# Patient Record
Sex: FEMALE | Race: BLACK OR AFRICAN AMERICAN | NOT HISPANIC OR LATINO | URBAN - METROPOLITAN AREA
[De-identification: names, ages, dates, MRNs, and addresses within clinical notes are randomized per-mention and may not be internally consistent; named-entity substitution may affect disease eponyms.]

---

## 2017-07-09 ENCOUNTER — INPATIENT (INPATIENT)
Facility: HOSPITAL | Age: 72
LOS: 2 days | Discharge: HOME | End: 2017-07-12
Attending: HOSPITALIST | Admitting: HOSPITALIST
Payer: COMMERCIAL

## 2017-07-09 VITALS
RESPIRATION RATE: 15 BRPM | OXYGEN SATURATION: 100 % | DIASTOLIC BLOOD PRESSURE: 63 MMHG | TEMPERATURE: 98 F | SYSTOLIC BLOOD PRESSURE: 142 MMHG | HEART RATE: 71 BPM

## 2017-07-09 DIAGNOSIS — Z79.890 HORMONE REPLACEMENT THERAPY: ICD-10-CM

## 2017-07-09 DIAGNOSIS — I10 ESSENTIAL (PRIMARY) HYPERTENSION: ICD-10-CM

## 2017-07-09 DIAGNOSIS — L03.115 CELLULITIS OF RIGHT LOWER LIMB: ICD-10-CM

## 2017-07-09 DIAGNOSIS — Z90.710 ACQUIRED ABSENCE OF BOTH CERVIX AND UTERUS: Chronic | ICD-10-CM

## 2017-07-09 LAB
ALBUMIN SERPL ELPH-MCNC: 3.8 G/DL — SIGNIFICANT CHANGE UP (ref 3.3–5)
ALP SERPL-CCNC: 87 U/L — SIGNIFICANT CHANGE UP (ref 40–120)
ALT FLD-CCNC: 12 U/L — SIGNIFICANT CHANGE UP (ref 4–33)
AST SERPL-CCNC: 18 U/L — SIGNIFICANT CHANGE UP (ref 4–32)
BASE EXCESS BLDV CALC-SCNC: 2.9 MMOL/L — SIGNIFICANT CHANGE UP
BASOPHILS # BLD AUTO: 0.01 K/UL — SIGNIFICANT CHANGE UP (ref 0–0.2)
BASOPHILS NFR BLD AUTO: 0.1 % — SIGNIFICANT CHANGE UP (ref 0–2)
BILIRUB SERPL-MCNC: 0.8 MG/DL — SIGNIFICANT CHANGE UP (ref 0.2–1.2)
BLOOD GAS VENOUS - CREATININE: 0.65 MG/DL — SIGNIFICANT CHANGE UP (ref 0.5–1.3)
BUN SERPL-MCNC: 12 MG/DL — SIGNIFICANT CHANGE UP (ref 7–23)
CALCIUM SERPL-MCNC: 9.7 MG/DL — SIGNIFICANT CHANGE UP (ref 8.4–10.5)
CHLORIDE BLDV-SCNC: 108 MMOL/L — SIGNIFICANT CHANGE UP (ref 96–108)
CHLORIDE SERPL-SCNC: 102 MMOL/L — SIGNIFICANT CHANGE UP (ref 98–107)
CO2 SERPL-SCNC: 25 MMOL/L — SIGNIFICANT CHANGE UP (ref 22–31)
CREAT SERPL-MCNC: 0.77 MG/DL — SIGNIFICANT CHANGE UP (ref 0.5–1.3)
EOSINOPHIL # BLD AUTO: 0.11 K/UL — SIGNIFICANT CHANGE UP (ref 0–0.5)
EOSINOPHIL NFR BLD AUTO: 1.4 % — SIGNIFICANT CHANGE UP (ref 0–6)
GAS PNL BLDV: 136 MMOL/L — SIGNIFICANT CHANGE UP (ref 136–146)
GLUCOSE BLDV-MCNC: 102 — HIGH (ref 70–99)
GLUCOSE SERPL-MCNC: 102 MG/DL — HIGH (ref 70–99)
HCO3 BLDV-SCNC: 25 MMOL/L — SIGNIFICANT CHANGE UP (ref 20–27)
HCT VFR BLD CALC: 28.8 % — LOW (ref 34.5–45)
HCT VFR BLDV CALC: 31.2 % — LOW (ref 34.5–45)
HGB BLD-MCNC: 10.1 G/DL — LOW (ref 11.5–15.5)
HGB BLDV-MCNC: 10.1 G/DL — LOW (ref 11.5–15.5)
IMM GRANULOCYTES # BLD AUTO: 0.04 # — SIGNIFICANT CHANGE UP
IMM GRANULOCYTES NFR BLD AUTO: 0.5 % — SIGNIFICANT CHANGE UP (ref 0–1.5)
LACTATE BLDV-MCNC: 0.9 MMOL/L — SIGNIFICANT CHANGE UP (ref 0.5–2)
LYMPHOCYTES # BLD AUTO: 1.53 K/UL — SIGNIFICANT CHANGE UP (ref 1–3.3)
LYMPHOCYTES # BLD AUTO: 18.9 % — SIGNIFICANT CHANGE UP (ref 13–44)
MCHC RBC-ENTMCNC: 33.6 PG — SIGNIFICANT CHANGE UP (ref 27–34)
MCHC RBC-ENTMCNC: 35.1 % — SIGNIFICANT CHANGE UP (ref 32–36)
MCV RBC AUTO: 95.7 FL — SIGNIFICANT CHANGE UP (ref 80–100)
MONOCYTES # BLD AUTO: 0.87 K/UL — SIGNIFICANT CHANGE UP (ref 0–0.9)
MONOCYTES NFR BLD AUTO: 10.7 % — SIGNIFICANT CHANGE UP (ref 2–14)
NEUTROPHILS # BLD AUTO: 5.54 K/UL — SIGNIFICANT CHANGE UP (ref 1.8–7.4)
NEUTROPHILS NFR BLD AUTO: 68.4 % — SIGNIFICANT CHANGE UP (ref 43–77)
NRBC # FLD: 0 — SIGNIFICANT CHANGE UP
PCO2 BLDV: 51 MMHG — SIGNIFICANT CHANGE UP (ref 41–51)
PH BLDV: 7.36 PH — SIGNIFICANT CHANGE UP (ref 7.32–7.43)
PLATELET # BLD AUTO: 173 K/UL — SIGNIFICANT CHANGE UP (ref 150–400)
PMV BLD: 10.8 FL — SIGNIFICANT CHANGE UP (ref 7–13)
PO2 BLDV: < 24 MMHG — LOW (ref 35–40)
POTASSIUM BLDV-SCNC: 4 MMOL/L — SIGNIFICANT CHANGE UP (ref 3.4–4.5)
POTASSIUM SERPL-MCNC: 4.3 MMOL/L — SIGNIFICANT CHANGE UP (ref 3.5–5.3)
POTASSIUM SERPL-SCNC: 4.3 MMOL/L — SIGNIFICANT CHANGE UP (ref 3.5–5.3)
PROT SERPL-MCNC: 9.2 G/DL — HIGH (ref 6–8.3)
RBC # BLD: 3.01 M/UL — LOW (ref 3.8–5.2)
RBC # FLD: 16.3 % — HIGH (ref 10.3–14.5)
SAO2 % BLDV: 26.1 % — LOW (ref 60–85)
SODIUM SERPL-SCNC: 140 MMOL/L — SIGNIFICANT CHANGE UP (ref 135–145)
WBC # BLD: 8.1 K/UL — SIGNIFICANT CHANGE UP (ref 3.8–10.5)
WBC # FLD AUTO: 8.1 K/UL — SIGNIFICANT CHANGE UP (ref 3.8–10.5)

## 2017-07-09 PROCEDURE — 93971 EXTREMITY STUDY: CPT | Mod: 26,LT

## 2017-07-09 PROCEDURE — 99223 1ST HOSP IP/OBS HIGH 75: CPT | Mod: AI,GC

## 2017-07-09 RX ORDER — AMLODIPINE BESYLATE 2.5 MG/1
5 TABLET ORAL DAILY
Qty: 0 | Refills: 0 | Status: DISCONTINUED | OUTPATIENT
Start: 2017-07-09 | End: 2017-07-12

## 2017-07-09 RX ORDER — IBUPROFEN 200 MG
400 TABLET ORAL ONCE
Qty: 0 | Refills: 0 | Status: COMPLETED | OUTPATIENT
Start: 2017-07-09 | End: 2017-07-09

## 2017-07-09 RX ORDER — ACETAMINOPHEN 500 MG
650 TABLET ORAL EVERY 6 HOURS
Qty: 0 | Refills: 0 | Status: DISCONTINUED | OUTPATIENT
Start: 2017-07-09 | End: 2017-07-12

## 2017-07-09 RX ORDER — VANCOMYCIN HCL 1 G
1000 VIAL (EA) INTRAVENOUS EVERY 12 HOURS
Qty: 0 | Refills: 0 | Status: DISCONTINUED | OUTPATIENT
Start: 2017-07-10 | End: 2017-07-11

## 2017-07-09 RX ORDER — METOPROLOL TARTRATE 50 MG
50 TABLET ORAL
Qty: 0 | Refills: 0 | Status: DISCONTINUED | OUTPATIENT
Start: 2017-07-09 | End: 2017-07-12

## 2017-07-09 RX ORDER — OXYCODONE HYDROCHLORIDE 5 MG/1
5 TABLET ORAL EVERY 6 HOURS
Qty: 0 | Refills: 0 | Status: DISCONTINUED | OUTPATIENT
Start: 2017-07-09 | End: 2017-07-12

## 2017-07-09 RX ORDER — VANCOMYCIN HCL 1 G
1000 VIAL (EA) INTRAVENOUS ONCE
Qty: 0 | Refills: 0 | Status: COMPLETED | OUTPATIENT
Start: 2017-07-09 | End: 2017-07-09

## 2017-07-09 RX ORDER — HEPARIN SODIUM 5000 [USP'U]/ML
5000 INJECTION INTRAVENOUS; SUBCUTANEOUS EVERY 8 HOURS
Qty: 0 | Refills: 0 | Status: DISCONTINUED | OUTPATIENT
Start: 2017-07-09 | End: 2017-07-12

## 2017-07-09 RX ORDER — FUROSEMIDE 40 MG
20 TABLET ORAL DAILY
Qty: 0 | Refills: 0 | Status: DISCONTINUED | OUTPATIENT
Start: 2017-07-09 | End: 2017-07-09

## 2017-07-09 RX ADMIN — Medication 50 MILLIGRAM(S): at 21:37

## 2017-07-09 RX ADMIN — OXYCODONE HYDROCHLORIDE 5 MILLIGRAM(S): 5 TABLET ORAL at 23:44

## 2017-07-09 RX ADMIN — Medication 250 MILLIGRAM(S): at 14:12

## 2017-07-09 RX ADMIN — Medication 400 MILLIGRAM(S): at 15:00

## 2017-07-09 RX ADMIN — Medication 400 MILLIGRAM(S): at 15:16

## 2017-07-09 NOTE — ED PROVIDER NOTE - OBJECTIVE STATEMENT
71 y/o F with PMH of HTN presents with RLE swelling & erythema of 6 week duration s/p trip and fall.. Patient states she has been on three outpatient antibiotic regimens, most recently Bactrim, but infection has not improved. PMD Dr. Brown English recommended she come to hospital for IV abx. Patient denies any fever/chills, night sweats, SOB, CP, recent travel. She reports hematochezia of two day duration which she attributes to medication regimen, but denies any abdominal pain. 73 y/o F with PMH of HTN presents with RLE swelling & erythema of 6 week duration s/p trip and fall.. Patient states she has been on three outpatient antibiotic regimens, most recently Bactrim, but infection has not improved. PMD Dr. Brown English recommended she come to hospital for IV abx. Patient denies any fever/chills, night sweats, SOB, CP, recent travel. She reports hematochezia of two day duration which she attributes to medication regimen, but denies any abdominal pain, dizziness, fatigue or SOB>.

## 2017-07-09 NOTE — ED ADULT TRIAGE NOTE - CHIEF COMPLAINT QUOTE
Patient sent in by PMD for IV antibiotics. Patient s/p fall one month ago and was diagnosed with cellulitis to RLE which failed PO antibiotics. RLE erythematous and warm to touch. PMH- htn PSH- hysterectomy

## 2017-07-09 NOTE — H&P ADULT - HISTORY OF PRESENT ILLNESS
73 yo F with PMH of HTN presents for RLE swelling and pain for 6 weeks. Patient reports walking in a department store on May 26, 2017 when she tripped over a rack with her right leg and fell. She didn't notice any obvious cut on her right leg at the time. The next morning, patient endorses egg-size swelling and pain over the midshin of right leg. Patient was seen by PMD with xray noting soft tissue tear but no bone fracture or tear. Patient was started on 3 PO abx of the courses of one month with no significant improvement in rt leg symptoms. She does not remember the first 2 abx but last abx was bactrim and she was admitted to come to ED for 71 yo F with PMH of HTN presents for RLE swelling and pain for 6 weeks. Patient reports walking in a department store on May 26, 2017 when she tripped over a rack with her right leg and fell. She didn't notice any obvious cut on her right leg at the time. The next morning, patient endorses egg-size swelling and pain over the midshin of right leg. Patient was seen by PMD with xray noting soft tissue tear but no bone fracture or tear. Patient was started on 3 PO abx over the course of one month with no significant improvement in rt leg symptoms. She does not remember the first 2 abx but last abx was bactrim (started on Wednesday) and she was advised by PMD to come to ED for IV abx by Sunday (7/9) as her symptoms as has not improved. Patient reports the right foot pain as 5/10 during the day and 10/10 at night. She take 4 advil per day with adequately pain control only during the day. She denies fever, chill, n/v, abd pain, SOB, HA, dizziness dysuria, diarrhea, fatigue. No recent traveling or sick contact.    In ED, T98, HR 71, /63, HR 15, Sat 100 RA. Patient was given 1g of vancomycin. 73 yo F with PMH of HTN presents for RLE swelling and pain for 6 weeks. Patient reports walking in a department store on May 26, 2017 when she tripped over a rack with her right leg and fell. She didn't notice any obvious cut on her right leg at the time. The next morning, patient endorses egg-size swelling and pain over the midshin of right leg. Patient was seen by PMD with xray noting soft tissue tear but no bone fracture or tear. Patient was started on 3 PO abx over the course of one month with no significant improvement in rt leg symptoms. She does not remember the first 2 abx but last abx was bactrim (started on Wednesday) and she was advised by PMD to come to ED for IV abx by Sunday (7/9) as her symptoms as has not improved. Patient reports the right foot pain as 5/10 during the day and 10/10 at night. She take 4 advil per day with adequately pain control only during the day. She denies fever, chill, n/v, abd pain, SOB, HA, dizziness dysuria, diarrhea, fatigue. No recent traveling, sick contact, hx of cellulitis in the past.     In ED, T98, HR 71, /63, HR 15, Sat 100 RA. Patient was given 1g of vancomycin.

## 2017-07-09 NOTE — PROGRESS NOTE ADULT - ASSESSMENT
73 yo F with PMH of HTN presents for RLE swelling and pain for past month likely secondary to cellulitis, receiving empiric treatment with IV vancomycin.

## 2017-07-09 NOTE — ED PROVIDER NOTE - ATTENDING CONTRIBUTION TO CARE
72f, pmhx htn. sent by PMD for IV abx. pt with 1 month of RLE pain and swelling. unchanged. no f/c, c/p, sob. started after local trauma. not immunocompromised. no h/o vte or vte rf. PMD put her on 3 different abx (she doesn't know names other than bactrim, her most recent one), with no improvement. exam, vs wnl, and. hs and lungs normal, abdo benign. right calf and ankle erythema, pain and swelling and heat. nil above knee. pulses intact, nvi. likely RLE cellulitis, will r/o dvt. no s/s nec fasc. will give vanc, bloods, duplex, admit.

## 2017-07-09 NOTE — H&P ADULT - NSHPPHYSICALEXAM_GEN_ALL_CORE
PHYSICAL EXAM:  GENERAL: NAD, well-groomed, well-developed  HEAD:  Atraumatic, Normocephalic  EYES: EOMI, PERRLA, conjunctiva and sclera clear  ENMT: No tonsillar erythema, exudates, or enlargement; Moist mucous membranes,   NECK: Supple, No JVD, Normal thyroid  HEART: Regular rate and rhythm; No murmurs, rubs, or gallops  RESPIRATORY: CTA B/L, No W/R/R  ABDOMEN: Soft, Nontender, Nondistended; Bowel sounds present  NEUROLOGY: A&Ox3, nonfocal, CN II-XII grossly intact, sensation intact, no gait abnormalities bilaterally;  EXTREMITIES:  2+ Peripheral Pulses  SKIN: right lower extremity nonpitting swelling with tenderness distal to midshin. Midshin midline linear depression with surrounding warmth, swelling and erythema.

## 2017-07-09 NOTE — H&P ADULT - PROBLEM SELECTOR PLAN 1
-likely cellulitis 2/2 recent injury due to mechanical fall  -deep infection less likely as patient has no fever, leukocytosis with no drainage or obvious wound on the RLE  -will c/w vancomycin for now as patient has failed to improved with PO abx for 1 month  -if clinically improved on vancomycin, will deescalate abx regimen  - -likely cellulitis 2/2 recent injury due to mechanical fall  -deep infection less likely as patient has no fever, leukocytosis with no drainage or obvious wound on the RLE  -s/p 1g of vancomycin in ED  -patient has no risk factor for MRSA, will continue empirical coverage with IV clindamcyin, consider swithcing to PO abx if clinically improving  -f/u blood cx -likely cellulitis 2/2 recent injury due to mechanical fall  -deep infection less likely as patient has no fever, leukocytosis with no drainage on the RLE  -U/S of RLE was negative for DVT  -s/p 1g of vancomycin in ED  -patient has no risk factor for MRSA, will continue empirical coverage with IV clindamcyin, consider swithcing to PO abx if clinically improving  -f/u blood cx -likely cellulitis 2/2 recent injury due to mechanical fall  -deep infection less likely as patient has no fever, leukocytosis with no purulent drainage on the RLE  -U/S of RLE was negative for DVT  -s/p 1g of vancomycin in ED  -patient has no risk factor for MRSA, will continue empirical coverage with IV clindamycin, consider switching to PO abx if clinically improving  -f/u blood cx -likely cellulitis 2/2 recent injury due to mechanical fall  -deep infection less likely as patient has no fever, leukocytosis with no purulent drainage on the RLE  -U/S of RLE was negative for DVT  -c/w empirical coverage with vancomycin  -check ESR/CRP and xray of right foot to r/o OM  -f/u blood cx  -consult ID

## 2017-07-09 NOTE — ED PROVIDER NOTE - MEDICAL DECISION MAKING DETAILS
73 y/o F hx of HTN presents with RLE cellulitis of 6 week duration failed outpatient abx treatment. Will consider DVT although no risk factors. Less likely necrotizing soft tissue infection as has remained stable.    Plan: basic labs + lactate, venous duplex r/o DVT, start IV abx, admit.

## 2017-07-09 NOTE — PROGRESS NOTE ADULT - PROBLEM SELECTOR PLAN 2
-BP at goal of <150 systolic  -c/w home metoprolol, amlodipine.  Lasix held while in hospital. If pressures rise, can resume it.

## 2017-07-09 NOTE — PROGRESS NOTE ADULT - SUBJECTIVE AND OBJECTIVE BOX
CARE MODEL A ACCEPT NOTE    Patient is a 72y old  Female who presents with a chief complaint of right foot pain/swelling (09 Jul 2017 14:29)       HPI: Patient is a 72 year old with PMH hypertension who presents with RLE pain, swelling, warmth, and erythema distal to the knee after falling over on a clothing rack 1 month prior to admission. When she fell, she noticed no cuts or breaks in the skin. The following day, she noticed pain and a raised lump on  her right mid-shin. Her walking/ROM have not been affected. Patient denies fevers/chills during this time. She went to her PMD several times in the past month for treatment of this painful leg. She was started on a number of oral Abx which she cannot remember (per chart, pt. was on Bactrim last Wednesday), but none of these oral Abx treated her symptoms.     In the ED, patient had a RLE ultrasound which ruled out DVT. Labs showed no leukocytosis. X-ray of tibia was performed to rule out osteomyelitis.     MEDICATIONS  (STANDING):  metoprolol 50 milliGRAM(s) Oral two times a day  amLODIPine   Tablet 5 milliGRAM(s) Oral daily  heparin  Injectable 5000 Unit(s) SubCutaneous every 8 hours  vancomycin  IVPB 1000 milliGRAM(s) IV Intermittent every 12 hours    MEDICATIONS  (PRN):  acetaminophen   Tablet. 650 milliGRAM(s) Oral every 6 hours PRN mild to moderate pain  oxyCODONE    IR 5 milliGRAM(s) Oral every 6 hours PRN Severe Pain (7 - 10)      Allergies    No Known Allergies    Intolerances        REVIEW OF SYSTEMS:  CONSTITUTIONAL: No fever, or fatigue  ENMT: no throat pain  RESPIRATORY: No cough, No shortness of breath  CARDIOVASCULAR: + Right leg swelling. No chest pain, palpitations, dizziness.  GASTROINTESTINAL: No nausea, vomiting, diarrhea, or abdominal pain.   NEUROLOGICAL: No numbness. Intact sensation  SKIN: Swelling, redness and pain on right lower extremity distal to the knee  MUSCULOSKELETAL: no difficulties walking on RLE      Vital Signs Last 24 Hrs  T(C): 36.5 (09 Jul 2017 15:57), Max: 36.7 (09 Jul 2017 11:19)  T(F): 97.7 (09 Jul 2017 15:57), Max: 98 (09 Jul 2017 11:19)  HR: 72 (09 Jul 2017 15:57) (71 - 77)  BP: 149/76 (09 Jul 2017 15:57) (142/63 - 149/76)  BP(mean): --  RR: 18 (09 Jul 2017 15:57) (15 - 18)  SpO2: 100% (09 Jul 2017 15:57) (100% - 100%)    PHYSICAL EXAM:  GENERAL: NAD  HEAD:  Atraumatic, Normocephalic  EYES: EOMI, PERRLA, conjunctiva and sclera clear  ENMT:  Moist mucous membranes  NERVOUS SYSTEM: AOX3, motor and sensation grossly intact in b/l UE and b/l LE  PSYCHIATRIC: Appropriate affect and mood  CHEST/LUNG: Clear to auscultation bilaterally  HEART: Regular rate and rhythm; No murmurs, rubs, or gallops. 2+ pitting LE edema in RLE  ABDOMEN: Soft, Nontender, Nondistended; Bowel sounds present  EXTREMITIES:  2+ Peripheral Pulses, dorsalis pedis pulse not felt in RLE. 2+ pitting edema in RLE.  SKIN: Erythema and induration in RLE mid-shin, no purulence/discharge, warm to the touch     LABS:                        10.1   8.10  )-----------( 173      ( 09 Jul 2017 12:45 )             28.8     09 Jul 2017 12:45    140    |  102    |  12     ----------------------------<  102    4.3     |  25     |  0.77     Ca    9.7        09 Jul 2017 12:45    TPro  9.2    /  Alb  3.8    /  TBili  0.8    /  DBili  x      /  AST  18     /  ALT  12     /  AlkPhos  87     09 Jul 2017 12:45      CAPILLARY BLOOD GLUCOSE        BLOOD CULTURE    RADIOLOGY & ADDITIONAL TESTS:    Imaging Personally Reviewed:  [ ] YES     Consultant(s) Notes Reviewed:      Care Discussed with Consultants/Other Providers: CARE MODEL A ACCEPT NOTE    Patient is a 72y old  Female who presents with a chief complaint of right foot pain/swelling (09 Jul 2017 14:29)       HPI: Patient is a 72 year old with PMH hypertension who presents with RLE pain, swelling, warmth, and erythema distal to the knee after falling over on a clothing rack 1 month prior to admission. When she fell, she noticed no cuts or breaks in the skin. The following day, she noticed pain and a raised lump on  her right mid-shin. Her walking/ROM have not been affected. Patient denies fevers/chills during this time. She went to her PMD several times in the past month for treatment of this painful leg. She was started on a number of oral Abx which she cannot remember (per chart, pt. was on Bactrim last Wednesday), but none of these oral Abx treated her symptoms.     In the ED, patient had a RLE ultrasound which ruled out DVT. Labs showed no leukocytosis. X-ray of tibia was ordered to rule out osteomyelitis.     MEDICATIONS  (STANDING):  metoprolol 50 milliGRAM(s) Oral two times a day  amLODIPine   Tablet 5 milliGRAM(s) Oral daily  heparin  Injectable 5000 Unit(s) SubCutaneous every 8 hours  vancomycin  IVPB 1000 milliGRAM(s) IV Intermittent every 12 hours    MEDICATIONS  (PRN):  acetaminophen   Tablet. 650 milliGRAM(s) Oral every 6 hours PRN mild to moderate pain  oxyCODONE    IR 5 milliGRAM(s) Oral every 6 hours PRN Severe Pain (7 - 10)      Allergies    No Known Allergies    Intolerances        REVIEW OF SYSTEMS:  CONSTITUTIONAL: No fever, or fatigue  ENMT: no throat pain  RESPIRATORY: No cough, No shortness of breath  CARDIOVASCULAR: + Right leg swelling. No chest pain, palpitations, dizziness.  GASTROINTESTINAL: No nausea, vomiting, diarrhea, or abdominal pain.   NEUROLOGICAL: No numbness. Intact sensation  SKIN: Swelling, redness and pain on right lower extremity distal to the knee  MUSCULOSKELETAL: no difficulties walking on RLE      Vital Signs Last 24 Hrs  T(C): 36.5 (09 Jul 2017 15:57), Max: 36.7 (09 Jul 2017 11:19)  T(F): 97.7 (09 Jul 2017 15:57), Max: 98 (09 Jul 2017 11:19)  HR: 72 (09 Jul 2017 15:57) (71 - 77)  BP: 149/76 (09 Jul 2017 15:57) (142/63 - 149/76)  BP(mean): --  RR: 18 (09 Jul 2017 15:57) (15 - 18)  SpO2: 100% (09 Jul 2017 15:57) (100% - 100%)    PHYSICAL EXAM:  GENERAL: NAD  HEAD:  Atraumatic, Normocephalic  EYES: EOMI, PERRLA, conjunctiva and sclera clear  ENMT:  Moist mucous membranes  NERVOUS SYSTEM: AOX3, motor and sensation grossly intact in b/l UE and b/l LE  PSYCHIATRIC: Appropriate affect and mood  CHEST/LUNG: Clear to auscultation bilaterally  HEART: Regular rate and rhythm; No murmurs, rubs, or gallops. 2+ pitting LE edema in RLE  ABDOMEN: Soft, Nontender, Nondistended; Bowel sounds present  EXTREMITIES:  2+ Peripheral Pulses, dorsalis pedis pulse not felt in RLE. 2+ pitting edema in RLE.  SKIN: Erythema and induration in RLE mid-shin, no purulence/discharge, warm to the touch     LABS:                        10.1   8.10  )-----------( 173      ( 09 Jul 2017 12:45 )             28.8     09 Jul 2017 12:45    140    |  102    |  12     ----------------------------<  102    4.3     |  25     |  0.77     Ca    9.7        09 Jul 2017 12:45    TPro  9.2    /  Alb  3.8    /  TBili  0.8    /  DBili  x      /  AST  18     /  ALT  12     /  AlkPhos  87     09 Jul 2017 12:45      CAPILLARY BLOOD GLUCOSE        BLOOD CULTURE    RADIOLOGY & ADDITIONAL TESTS:    Imaging Personally Reviewed:  [ ] YES     Consultant(s) Notes Reviewed:      Care Discussed with Consultants/Other Providers:

## 2017-07-09 NOTE — H&P ADULT - ATTENDING COMMENTS
72 y.o. female w/ hx HTN who had a atraumatic mechanical fall at a department store 6 weeks ago and develop right shin and ankle erythema, swelling and pain. She has tried 3 different outpatient abx including most recently bactrim which has not improved symptoms so brought to ED. She also had an outpatient MRI 1 week ago which she doesn't have the results for. Will admit for RLE cellulitis w/ possible posterior lateral fluid collection in left ankle. Start IV vancomycin. Son to provide names of other abx tried as outpatient and MRI results. Check ESR, CRP and XRays of  left shin and ankle. Consult ID for advice of abx and fluid ankle fluid collection. May need to be drained.

## 2017-07-09 NOTE — PROGRESS NOTE ADULT - PROBLEM SELECTOR PLAN 1
-likely cellulitis secondary to break in skin following a fall one month prior.   -we do not suspect deeper infection due to patient being afebrile, no leukocytosis, and X-ray showing no osteomyelitis.  - IV vancomycin started 7/9, will follow up with ID in the AM.  -U/S of RLE was negative for DVT  -c/w empirical coverage with vancomycin  -check ESR/CRP and xray of right foot to r/o OM  -f/u blood cx -likely cellulitis secondary to break in skin following a fall one month prior.   -we do not suspect deeper infection due to patient being afebrile, no leukocytosis, no drainage   - IV vancomycin started 7/9, will follow up with ID in the AM.  -U/S of RLE was negative for DVT  -c/w empirical coverage with vancomycin  -check ESR/CRP and xray of right foot to r/o OM  -f/u blood cx

## 2017-07-09 NOTE — ED PROVIDER NOTE - PROGRESS NOTE DETAILS
Reached answering service for Dr. Rogers to inquire to whom he admits at [facility]. Reached answering service [facility] and left message for Dr. Rogers to inquire to whom he admits at [facility]. Reached answering service at [facility] notifying that answering service does not take calls from hospitals and may refer to own hospitalist. LE venous duplex negative for DVT.

## 2017-07-09 NOTE — H&P ADULT - NSHPLABSRESULTS_GEN_ALL_CORE
CBC noted no leukocytosis.  BMP was WNL.  U/S right LE was negative for DVT. CBC noted no leukocytosis and mild normocytic anemia.  CMP was WNL.  U/S right LE was negative for DVT.

## 2017-07-10 LAB
BUN SERPL-MCNC: 10 MG/DL — SIGNIFICANT CHANGE UP (ref 7–23)
CALCIUM SERPL-MCNC: 9.2 MG/DL — SIGNIFICANT CHANGE UP (ref 8.4–10.5)
CHLORIDE SERPL-SCNC: 104 MMOL/L — SIGNIFICANT CHANGE UP (ref 98–107)
CO2 SERPL-SCNC: 25 MMOL/L — SIGNIFICANT CHANGE UP (ref 22–31)
CREAT SERPL-MCNC: 0.73 MG/DL — SIGNIFICANT CHANGE UP (ref 0.5–1.3)
CRP SERPL-MCNC: 28.9 MG/L — HIGH (ref 0.3–5)
ERYTHROCYTE [SEDIMENTATION RATE] IN BLOOD: 117 MM/HR — HIGH (ref 4–25)
GLUCOSE SERPL-MCNC: 102 MG/DL — HIGH (ref 70–99)
HCT VFR BLD CALC: 29.6 % — LOW (ref 34.5–45)
HGB BLD-MCNC: 10.1 G/DL — LOW (ref 11.5–15.5)
MAGNESIUM SERPL-MCNC: 2.1 MG/DL — SIGNIFICANT CHANGE UP (ref 1.6–2.6)
MCHC RBC-ENTMCNC: 32 PG — SIGNIFICANT CHANGE UP (ref 27–34)
MCHC RBC-ENTMCNC: 34.1 % — SIGNIFICANT CHANGE UP (ref 32–36)
MCV RBC AUTO: 93.7 FL — SIGNIFICANT CHANGE UP (ref 80–100)
NRBC # FLD: 0 — SIGNIFICANT CHANGE UP
PHOSPHATE SERPL-MCNC: 3.2 MG/DL — SIGNIFICANT CHANGE UP (ref 2.5–4.5)
PLATELET # BLD AUTO: 180 K/UL — SIGNIFICANT CHANGE UP (ref 150–400)
PMV BLD: 11.1 FL — SIGNIFICANT CHANGE UP (ref 7–13)
POTASSIUM SERPL-MCNC: 3.9 MMOL/L — SIGNIFICANT CHANGE UP (ref 3.5–5.3)
POTASSIUM SERPL-SCNC: 3.9 MMOL/L — SIGNIFICANT CHANGE UP (ref 3.5–5.3)
RBC # BLD: 3.16 M/UL — LOW (ref 3.8–5.2)
RBC # FLD: 16.7 % — HIGH (ref 10.3–14.5)
SODIUM SERPL-SCNC: 141 MMOL/L — SIGNIFICANT CHANGE UP (ref 135–145)
SPECIMEN SOURCE: SIGNIFICANT CHANGE UP
SPECIMEN SOURCE: SIGNIFICANT CHANGE UP
WBC # BLD: 8.09 K/UL — SIGNIFICANT CHANGE UP (ref 3.8–10.5)
WBC # FLD AUTO: 8.09 K/UL — SIGNIFICANT CHANGE UP (ref 3.8–10.5)

## 2017-07-10 PROCEDURE — 73590 X-RAY EXAM OF LOWER LEG: CPT | Mod: 26,RT

## 2017-07-10 PROCEDURE — 73630 X-RAY EXAM OF FOOT: CPT | Mod: 26,RT

## 2017-07-10 PROCEDURE — 99233 SBSQ HOSP IP/OBS HIGH 50: CPT | Mod: GC

## 2017-07-10 PROCEDURE — 99222 1ST HOSP IP/OBS MODERATE 55: CPT

## 2017-07-10 RX ORDER — FUROSEMIDE 40 MG
20 TABLET ORAL DAILY
Qty: 0 | Refills: 0 | Status: DISCONTINUED | OUTPATIENT
Start: 2017-07-10 | End: 2017-07-12

## 2017-07-10 RX ADMIN — OXYCODONE HYDROCHLORIDE 5 MILLIGRAM(S): 5 TABLET ORAL at 22:19

## 2017-07-10 RX ADMIN — Medication 20 MILLIGRAM(S): at 09:37

## 2017-07-10 RX ADMIN — OXYCODONE HYDROCHLORIDE 5 MILLIGRAM(S): 5 TABLET ORAL at 00:14

## 2017-07-10 RX ADMIN — AMLODIPINE BESYLATE 5 MILLIGRAM(S): 2.5 TABLET ORAL at 06:09

## 2017-07-10 RX ADMIN — Medication 50 MILLIGRAM(S): at 06:09

## 2017-07-10 RX ADMIN — Medication 50 MILLIGRAM(S): at 22:18

## 2017-07-10 RX ADMIN — OXYCODONE HYDROCHLORIDE 5 MILLIGRAM(S): 5 TABLET ORAL at 23:15

## 2017-07-10 RX ADMIN — Medication 250 MILLIGRAM(S): at 03:44

## 2017-07-10 RX ADMIN — Medication 250 MILLIGRAM(S): at 13:30

## 2017-07-10 NOTE — PROGRESS NOTE ADULT - PROBLEM SELECTOR PLAN 1
-can be cellulitis secondary to break in skin, vs. complex regional pain syndrome, following a fall one month prior.   -we do not suspect deeper infection due to patient being afebrile, no leukocytosis, no drainage   - IV vancomycin started 7/9, will follow up with ID.  -U/S of RLE was negative for DVT  -c/w empirical coverage with vancomycin  -check ESR/CRP and xray of right foot to r/o OM  -f/u blood cultures  - consider complex regional pain syndrome, obtain triple phase bone scan.  - Obtain MRI records from patient's PCP (MRI done 2 weeks ago) -can be cellulitis secondary to break in skin, vs. complex regional pain syndrome, following a fall one month prior.   -we do not suspect deeper infection due to patient being afebrile, no leukocytosis, no drainage   - IV vancomycin started 7/9, will follow up with ID.  -U/S of RLE was negative for DVT  -c/w empirical coverage with vancomycin  -ESR and CRP elevated, suggestive of infection.  - Follow up xray of right foot and tibia to r/o OM  -f/u blood cultures  - consider complex regional pain syndrome, obtain triple phase bone scan.  - Obtain MRI records from patient's PCP (MRI done 2 weeks ago)

## 2017-07-10 NOTE — PROGRESS NOTE ADULT - SUBJECTIVE AND OBJECTIVE BOX
Patient is a 72y old  Female who presents with a chief complaint of right foot pain/swelling (09 Jul 2017 14:29)       INTERVAL HPI/OVERNIGHT EVENTS: No events overnight. Patient reports slight improvement in RLE pain. Continued swelling and redness of the RLE. No fevers, chills, chest pain, or SOB. Continued on IV vancomycin.     MEDICATIONS  (STANDING):  metoprolol 50 milliGRAM(s) Oral two times a day  amLODIPine   Tablet 5 milliGRAM(s) Oral daily  heparin  Injectable 5000 Unit(s) SubCutaneous every 8 hours  vancomycin  IVPB 1000 milliGRAM(s) IV Intermittent every 12 hours  furosemide    Tablet 20 milliGRAM(s) Oral daily    MEDICATIONS  (PRN):  acetaminophen   Tablet. 650 milliGRAM(s) Oral every 6 hours PRN mild to moderate pain  oxyCODONE    IR 5 milliGRAM(s) Oral every 6 hours PRN Severe Pain (7 - 10)      Allergies    No Known Allergies    Intolerances        REVIEW OF SYSTEMS:  CONSTITUTIONAL: No fever or fatigue  RESPIRATORY: No cough. No shortness of breath  CARDIOVASCULAR: No chest pain, palpitations, dizziness. + Leg swelling   GASTROINTESTINAL: No abdominal pain, nausea, or vomiting.   NEUROLOGICAL: No numbness.   SKIN: No itching. Redness and swelling of the RLE.    MUSCULOSKELETAL: No joint pain or swelling;       Vital Signs Last 24 Hrs  T(C): 36.8 (10 Jul 2017 06:02), Max: 37.1 (09 Jul 2017 21:02)  T(F): 98.3 (10 Jul 2017 06:02), Max: 98.7 (09 Jul 2017 21:02)  HR: 74 (10 Jul 2017 06:02) (72 - 82)  BP: 142/82 (10 Jul 2017 06:02) (136/68 - 149/76)  BP(mean): --  RR: 18 (10 Jul 2017 06:02) (18 - 18)  SpO2: 100% (10 Jul 2017 06:02) (100% - 100%)    PHYSICAL EXAM:  GENERAL: NAD, well-groomed, well-developed  HEAD:  Atraumatic, Normocephalic  EYES: EOMI, PERRLA, conjunctiva and sclera clear  ENMT:  Moist mucous membranes  NERVOUS SYSTEM: AOX3, motor and sensation grossly intact in b/l UE and b/l LE  PSYCHIATRIC: Appropriate affect and mood  CHEST/LUNG: Clear to auscultation bilaterally  HEART: Regular rate and rhythm. Edema in both lower extremities, significantly more on right.   ABDOMEN: Soft, Nontender, Nondistended; Bowel sounds present  EXTREMITIES:  2+ Peripheral Pulses  SKIN: Erythema, swelling and tenderness to touch of RLE distal to knee.    LABS:                        10.1   8.09  )-----------( 180      ( 10 Jul 2017 06:17 )             29.6     10 Jul 2017 06:17    141    |  104    |  10     ----------------------------<  102    3.9     |  25     |  0.73     Ca    9.2        10 Jul 2017 06:17  Phos  3.2       10 Jul 2017 06:17  Mg     2.1       10 Jul 2017 06:17        CAPILLARY BLOOD GLUCOSE        BLOOD CULTURE    RADIOLOGY & ADDITIONAL TESTS:    Imaging Personally Reviewed:  [ ] YES     Consultant(s) Notes Reviewed:      Care Discussed with Consultants/Other Providers:

## 2017-07-10 NOTE — CONSULT NOTE ADULT - ASSESSMENT
Right lower leg cellulitis in 73 yo woman who fell in store 6 weeks ago.  Has failed to resolve despite outpatient oral antibiotics.  With elevated inflammatory markers concern for underlying osteomyelitis.  Would consider repeating study. Agree with empiric Vanco.

## 2017-07-10 NOTE — PHYSICAL THERAPY INITIAL EVALUATION ADULT - GENERAL OBSERVATIONS, REHAB EVAL
Patient received semi supine in bed, in NAD , (+) IV fluids, (+) mild swelling and discoloration of R LE, (pigeon toed)  in toe gait during ambulation .

## 2017-07-10 NOTE — CONSULT NOTE ADULT - SUBJECTIVE AND OBJECTIVE BOX
HPI:  73 yo F with PMH of HTN presents for RLE swelling and pain for 6 weeks. Patient reports walking in a department store on May 26, 2017 when she tripped over a rack with her right leg and fell. She didn't notice any obvious cut on her right leg at the time. The next morning, patient endorses egg-size swelling and pain over the midshin of right leg. Patient was seen by PMD with xray noting soft tissue tear but no bone fracture or tear. Patient was started on 3 PO abx over the course of one month with no significant improvement in rt leg symptoms. She does not remember the first 2 abx but last abx was bactrim (started on Wednesday) and she was advised by PMD to come to ED for IV abx by Sunday (7/9) as her symptoms as has not improved. Patient reports the right foot pain as 5/10 during the day and 10/10 at night. Since admission yesterday continues to have pain right lower leg.    In ED, T98, HR 71, /63, HR 15, Sat 100 RA. Patient was given 1g of vancomycin. (09 Jul 2017 14:29)      PAST MEDICAL & SURGICAL HISTORY:  Essential hypertension  H/O abdominal hysterectomy      Allergies    No Known Allergies    Intolerances        ANTIMICROBIALS:  vancomycin  IVPB 1000 every 12 hours      OTHER MEDS:  metoprolol 50 milliGRAM(s) Oral two times a day  amLODIPine   Tablet 5 milliGRAM(s) Oral daily  heparin  Injectable 5000 Unit(s) SubCutaneous every 8 hours  acetaminophen   Tablet. 650 milliGRAM(s) Oral every 6 hours PRN  oxyCODONE    IR 5 milliGRAM(s) Oral every 6 hours PRN  furosemide    Tablet 20 milliGRAM(s) Oral daily      SOCIAL HISTORY:    FAMILY HISTORY:  No pertinent family history in first degree relatives      REVIEW OF SYSTEMS  [  ] ROS unobtainable because:    [ x ] All other systems negative except as noted below:	    Constitutional:  [ ] fever   Skin:  [ ] rash [ ] phlebitis	  Eyes: [ ] icterus [ ] inflammation	  ENMT: [ ] discharge [ ] thrush [ ] ulcers [ ] exudates  Respiratory: [ ] dyspnea [ ] hemoptysis [ ] cough [ ] sputum	  Cardiovascular:  [ ] chest pain [ ] palpitations [ ] edema	  Gastrointestinal:  [ ] nausea [ ] vomiting [ ] diarrhea [ ] constipation [ ] pain	  Genitourinary:  [ ] dysuria [ ] frequency [ ] hematuria [ ] discharge [ ] flank pain  Musculoskeletal:  right leg pain and swelling	  Neurological:  [ ] headache [ ] seizures	  Psychiatric:  [ ] anxiety [ ] depression	  Hematology/Lymphatics:  [ ] lymphadenopathy  Endocrine:  [ ] adrenal [ ] thyroid  Allergic/Immunologic:	 [ ] transplant [ ] seasonal    PHYSICAL EXAM:  General: pleasant, non toxic  ENT:  [ ] normal [x ] supple   Cardiovascular:   [ ] murmur [x ] normal   Respiratory:  [x ] clear to ausculation bilaterally  GI:  [x] soft, non-tender, normal bowel sounds   Musculoskeletal:  increased warmth and tenderness right lower leg mid shin to below ankle  Neurologic:  [x ] non-focal exam   Skin:  [ ] no rash  Lymph: [ ] no lymphadenopathy  Psychiatric:  [x ] appropriate affect [x ] alert & oriented  Lines:  [x ] no phlebitis [ ] central line          Drug Dosing Weight  Height (cm): 160.02 (09 Jul 2017 16:00)  Weight (kg): 94.2 (09 Jul 2017 16:00)  BMI (kg/m2): 36.8 (09 Jul 2017 16:00)  BSA (m2): 1.97 (09 Jul 2017 16:00)    Vital Signs Last 24 Hrs  T(F): 98.2 (07-10-17 @ 14:41), Max: 98.7 (07-09-17 @ 21:02)    Vital Signs Last 24 Hrs  HR: 79 (07-10-17 @ 14:41) (74 - 82)  BP: 132/59 (07-10-17 @ 14:41) (132/59 - 142/82)  RR: 18 (07-10-17 @ 14:41)  SpO2: 100% (07-10-17 @ 14:41) (100% - 100%)  Wt(kg): --                          10.1   8.09  )-----------( 180      ( 10 Jul 2017 06:17 )       CRP 28             29.6       07-10    141  |  104  |  10  ----------------------------<  102<H>  3.9   |  25  |  0.73    Ca    9.2      10 Jul 2017 06:17  Phos  3.2     07-10  Mg     2.1     07-10    TPro  9.2<H>  /  Alb  3.8  /  TBili  0.8  /  DBili  x   /  AST  18  /  ALT  12  /  AlkPhos  87  07-09          MICROBIOLOGY:  Culture - Blood (07.09.17 @ 14:05)    Culture - Blood:   NO ORGANISMS ISOLATED  NO ORGANISMS ISOLATED AT 24 HOURS    Specimen Source: BLOOD VENOUS    Culture - Blood (07.09.17 @ 14:05)    Culture - Blood:   NO ORGANISMS ISOLATED  NO ORGANISMS ISOLATED AT 24 HOURS    Specimen Source: BLOOD PERIPHERAL      RADIOLOGY:    < from: Xray Foot AP + Lateral + Oblique, Right (07.10.17 @ 15:46) >  Diffuse soft tissue swelling. No tracking soft tissue gas collections,   radiopaque foreign bodies, or gross radiographic evidence for   osteomyelitis.    No fractures or dislocations in the above imaged anatomic regions.    Tarsometatarsal alignment maintained without evidence for a Lisfranc   injury.    Mild right knee tibiofemoral compartment degenerative change. Preserved   foot joint spaces and no joint margin erosions.    Os peroneum present adjacent to the lateral plantar cuboid margin.    Generalized mild osteopenia otherwise no discrete lytic or blastic   lesions.    < end of copied text >

## 2017-07-11 LAB — VANCOMYCIN TROUGH SERPL-MCNC: 9 UG/ML — LOW (ref 10–20)

## 2017-07-11 PROCEDURE — 78315 BONE IMAGING 3 PHASE: CPT | Mod: 26

## 2017-07-11 PROCEDURE — 99232 SBSQ HOSP IP/OBS MODERATE 35: CPT

## 2017-07-11 PROCEDURE — 99233 SBSQ HOSP IP/OBS HIGH 50: CPT | Mod: GC

## 2017-07-11 RX ORDER — VANCOMYCIN HCL 1 G
1250 VIAL (EA) INTRAVENOUS EVERY 12 HOURS
Qty: 0 | Refills: 0 | Status: DISCONTINUED | OUTPATIENT
Start: 2017-07-11 | End: 2017-07-12

## 2017-07-11 RX ORDER — POLYETHYLENE GLYCOL 3350 17 G/17G
17 POWDER, FOR SOLUTION ORAL DAILY
Qty: 0 | Refills: 0 | Status: DISCONTINUED | OUTPATIENT
Start: 2017-07-11 | End: 2017-07-12

## 2017-07-11 RX ORDER — SENNA PLUS 8.6 MG/1
2 TABLET ORAL AT BEDTIME
Qty: 0 | Refills: 0 | Status: DISCONTINUED | OUTPATIENT
Start: 2017-07-11 | End: 2017-07-12

## 2017-07-11 RX ADMIN — SENNA PLUS 2 TABLET(S): 8.6 TABLET ORAL at 22:15

## 2017-07-11 RX ADMIN — OXYCODONE HYDROCHLORIDE 5 MILLIGRAM(S): 5 TABLET ORAL at 06:31

## 2017-07-11 RX ADMIN — OXYCODONE HYDROCHLORIDE 5 MILLIGRAM(S): 5 TABLET ORAL at 07:31

## 2017-07-11 RX ADMIN — Medication 50 MILLIGRAM(S): at 22:15

## 2017-07-11 RX ADMIN — Medication 20 MILLIGRAM(S): at 06:31

## 2017-07-11 RX ADMIN — Medication 50 MILLIGRAM(S): at 06:31

## 2017-07-11 RX ADMIN — Medication 166.67 MILLIGRAM(S): at 06:32

## 2017-07-11 RX ADMIN — Medication 166.67 MILLIGRAM(S): at 18:26

## 2017-07-11 RX ADMIN — AMLODIPINE BESYLATE 5 MILLIGRAM(S): 2.5 TABLET ORAL at 06:31

## 2017-07-11 RX ADMIN — POLYETHYLENE GLYCOL 3350 17 GRAM(S): 17 POWDER, FOR SOLUTION ORAL at 06:52

## 2017-07-11 NOTE — PROGRESS NOTE ADULT - ASSESSMENT
Right lower leg cellulitis Right lower leg cellulitis in woman who fell in department store 6 weeks ago.  Treated outpatient with antibiotic x 3 without improvement.  Now receiving Vanco IV.  ESR  117  CRP  28.  With elevated inflammatory markers concern for OM.  Had outpt MRI few weeks ago.  If can't be reviewed would repeat.    I will be away 7/12-7/16.  ID service will follow.

## 2017-07-11 NOTE — PROGRESS NOTE ADULT - SUBJECTIVE AND OBJECTIVE BOX
Patient is a 72y old  Female who presents with a chief complaint of right foot pain/swelling (09 Jul 2017 14:29)       INTERVAL HPI/OVERNIGHT EVENTS: No acute events overnight. Patient says slight decrease in pain severity in RLE, however at times during the night she has immense pain when the sheets touch her leg. No fevers, chills, SOB, or other complaints.     MEDICATIONS  (STANDING):  metoprolol 50 milliGRAM(s) Oral two times a day  amLODIPine   Tablet 5 milliGRAM(s) Oral daily  heparin  Injectable 5000 Unit(s) SubCutaneous every 8 hours  furosemide    Tablet 20 milliGRAM(s) Oral daily  vancomycin  IVPB 1250 milliGRAM(s) IV Intermittent every 12 hours  polyethylene glycol 3350 17 Gram(s) Oral daily  senna 2 Tablet(s) Oral at bedtime    MEDICATIONS  (PRN):  acetaminophen   Tablet. 650 milliGRAM(s) Oral every 6 hours PRN mild to moderate pain  oxyCODONE    IR 5 milliGRAM(s) Oral every 6 hours PRN Severe Pain (7 - 10)      Allergies    No Known Allergies    Intolerances        REVIEW OF SYSTEMS:  CONSTITUTIONAL: No fever  RESPIRATORY: No cough. No shortness of breath  CARDIOVASCULAR: No chest pain. + right leg swelling  GASTROINTESTINAL: No nausea, vomiting, abdominal pain, or diarrhea.  GENITOURINARY: No urinary complaints.   NEUROLOGICAL: No numbness. Has shooting/tingling sensation at times when she touches/scratches her legs  SKIN: tenderness to palpation of RLE and swelling.      Vital Signs Last 24 Hrs  T(C): 36.7 (11 Jul 2017 05:47), Max: 37.6 (10 Jul 2017 22:03)  T(F): 98.1 (11 Jul 2017 05:47), Max: 99.6 (10 Jul 2017 22:03)  HR: 70 (11 Jul 2017 05:47) (70 - 92)  BP: 111/73 (11 Jul 2017 05:47) (111/73 - 159/78)  BP(mean): --  RR: 16 (11 Jul 2017 05:47) (16 - 18)  SpO2: 100% (11 Jul 2017 05:47) (100% - 100%)    PHYSICAL EXAM:  GENERAL: NAD  HEAD:  Atraumatic, Normocephalic  EYES: EOMI, PERRLA, conjunctiva and sclera clear  ENMT:  Moist mucous membranes  NERVOUS SYSTEM: AOX3, motor and sensation grossly intact in b/l UE and b/l LE  PSYCHIATRIC: Appropriate affect and mood  CHEST/LUNG: Clear to auscultation bilaterally  HEART: Regular rate and rhythm  ABDOMEN: Soft, Nontender, Nondistended; Bowel sounds present  EXTREMITIES:  2+ Peripheral Pulses (except right dorsalis pedis pulse not felt); 2+ pitting edema of right foot and distal leg  SKIN: Warmth and erythema    LABS:      Ca    9.2        10 Jul 2017 06:17        CAPILLARY BLOOD GLUCOSE        BLOOD CULTURE    RADIOLOGY & ADDITIONAL TESTS:    Imaging Personally Reviewed:  [ ] YES     Consultant(s) Notes Reviewed:      Care Discussed with Consultants/Other Providers:

## 2017-07-12 VITALS
SYSTOLIC BLOOD PRESSURE: 151 MMHG | RESPIRATION RATE: 17 BRPM | HEART RATE: 86 BPM | TEMPERATURE: 100 F | OXYGEN SATURATION: 9 % | DIASTOLIC BLOOD PRESSURE: 74 MMHG

## 2017-07-12 DIAGNOSIS — G90.521 COMPLEX REGIONAL PAIN SYNDROME I OF RIGHT LOWER LIMB: ICD-10-CM

## 2017-07-12 LAB
HCT VFR BLD CALC: 25.4 % — LOW (ref 34.5–45)
HGB BLD-MCNC: 8.7 G/DL — LOW (ref 11.5–15.5)
MCHC RBC-ENTMCNC: 32 PG — SIGNIFICANT CHANGE UP (ref 27–34)
MCHC RBC-ENTMCNC: 34.3 % — SIGNIFICANT CHANGE UP (ref 32–36)
MCV RBC AUTO: 93.4 FL — SIGNIFICANT CHANGE UP (ref 80–100)
NRBC # FLD: 0.02 — SIGNIFICANT CHANGE UP
PLATELET # BLD AUTO: 170 K/UL — SIGNIFICANT CHANGE UP (ref 150–400)
PMV BLD: 11.5 FL — SIGNIFICANT CHANGE UP (ref 7–13)
RBC # BLD: 2.72 M/UL — LOW (ref 3.8–5.2)
RBC # FLD: 17 % — HIGH (ref 10.3–14.5)
VANCOMYCIN TROUGH SERPL-MCNC: 14.3 UG/ML — SIGNIFICANT CHANGE UP (ref 10–20)
WBC # BLD: 8.65 K/UL — SIGNIFICANT CHANGE UP (ref 3.8–10.5)
WBC # FLD AUTO: 8.65 K/UL — SIGNIFICANT CHANGE UP (ref 3.8–10.5)

## 2017-07-12 PROCEDURE — 99233 SBSQ HOSP IP/OBS HIGH 50: CPT | Mod: GC

## 2017-07-12 RX ORDER — AZTREONAM 2 G
1 VIAL (EA) INJECTION
Qty: 0 | Refills: 0 | COMMUNITY

## 2017-07-12 RX ORDER — ALENDRONATE SODIUM 70 MG/1
1 TABLET ORAL
Qty: 4 | Refills: 0 | OUTPATIENT
Start: 2017-07-12 | End: 2017-08-11

## 2017-07-12 RX ORDER — ALENDRONATE SODIUM 70 MG/1
1 TABLET ORAL
Qty: 4 | Refills: 0
Start: 2017-07-12 | End: 2017-08-11

## 2017-07-12 RX ORDER — GABAPENTIN 400 MG/1
1 CAPSULE ORAL
Qty: 90 | Refills: 0 | OUTPATIENT
Start: 2017-07-12 | End: 2017-08-11

## 2017-07-12 RX ADMIN — Medication 50 MILLIGRAM(S): at 21:16

## 2017-07-12 RX ADMIN — OXYCODONE HYDROCHLORIDE 5 MILLIGRAM(S): 5 TABLET ORAL at 18:33

## 2017-07-12 RX ADMIN — Medication 166.67 MILLIGRAM(S): at 05:19

## 2017-07-12 RX ADMIN — OXYCODONE HYDROCHLORIDE 5 MILLIGRAM(S): 5 TABLET ORAL at 06:10

## 2017-07-12 RX ADMIN — OXYCODONE HYDROCHLORIDE 5 MILLIGRAM(S): 5 TABLET ORAL at 19:15

## 2017-07-12 RX ADMIN — OXYCODONE HYDROCHLORIDE 5 MILLIGRAM(S): 5 TABLET ORAL at 05:16

## 2017-07-12 RX ADMIN — Medication 20 MILLIGRAM(S): at 05:19

## 2017-07-12 RX ADMIN — POLYETHYLENE GLYCOL 3350 17 GRAM(S): 17 POWDER, FOR SOLUTION ORAL at 07:01

## 2017-07-12 RX ADMIN — Medication 50 MILLIGRAM(S): at 05:19

## 2017-07-12 RX ADMIN — AMLODIPINE BESYLATE 5 MILLIGRAM(S): 2.5 TABLET ORAL at 05:19

## 2017-07-12 NOTE — PROGRESS NOTE ADULT - SUBJECTIVE AND OBJECTIVE BOX
Patient is a 72y old  Female who presents with a chief complaint of right foot pain/swelling (09 Jul 2017 14:29)       INTERVAL HPI/OVERNIGHT EVENTS: No acute events overnight. Patient reports somewhat improved swelling in RLE, with continued tenderness/"shooting electric sensation" on palpation. No SOB, fevers, or chills. No N/V/D/C.     MEDICATIONS  (STANDING):  metoprolol 50 milliGRAM(s) Oral two times a day  amLODIPine   Tablet 5 milliGRAM(s) Oral daily  heparin  Injectable 5000 Unit(s) SubCutaneous every 8 hours  furosemide    Tablet 20 milliGRAM(s) Oral daily  vancomycin  IVPB 1250 milliGRAM(s) IV Intermittent every 12 hours  polyethylene glycol 3350 17 Gram(s) Oral daily  senna 2 Tablet(s) Oral at bedtime    MEDICATIONS  (PRN):  acetaminophen   Tablet. 650 milliGRAM(s) Oral every 6 hours PRN mild to moderate pain  oxyCODONE    IR 5 milliGRAM(s) Oral every 6 hours PRN Severe Pain (7 - 10)      Allergies    No Known Allergies    Intolerances        REVIEW OF SYSTEMS:  CONSTITUTIONAL: No fever or fatigue.   EYES: No complaints.   ENMT:  No complaints.   RESPIRATORY: No cough, No shortness of breath  CARDIOVASCULAR: No chest pain, dizziness. + right leg swelling  GASTROINTESTINAL: No nausea, vomiting, abdominal pain, diarrhea.   GENITOURINARY: No urinary symptoms.   NEUROLOGICAL: No numbness or loss of strength  SKIN: Mild itching of right leg, redness and warmth of RLE  MUSCULOSKELETAL: Swelling in RLE, no joint pain      Vital Signs Last 24 Hrs  T(C): 36.9 (12 Jul 2017 05:15), Max: 37.6 (11 Jul 2017 21:46)  T(F): 98.4 (12 Jul 2017 05:15), Max: 99.6 (11 Jul 2017 21:46)  HR: 79 (12 Jul 2017 05:15) (71 - 87)  BP: 136/81 (12 Jul 2017 05:15) (108/56 - 136/81)  BP(mean): --  RR: 17 (12 Jul 2017 05:15) (17 - 19)  SpO2: 98% (12 Jul 2017 05:15) (97% - 99%)    PHYSICAL EXAM:  GENERAL: NAD  HEAD:  Atraumatic, Normocephalic  EYES: EOMI, PERRLA, conjunctiva and sclera clear  ENMT:  Moist mucous membranes  NERVOUS SYSTEM: AOX3, motor and sensation grossly intact in b/l UE and b/l LE  PSYCHIATRIC: Appropriate affect and mood  CHEST/LUNG: Clear to auscultation bilaterally  HEART: Regular rate and rhythm; RLE 2+ pitting edema in the feet and distal to knee  ABDOMEN: Soft, Nontender, Nondistended; Bowel sounds present  EXTREMITIES:  2+ Peripheral Pulses, dorsalis pedis pulse in RLE difficult to palpate  SKIN: warmth and swelling in RLE distal to knee    LABS:                        8.7    8.65  )-----------( 170      ( 12 Jul 2017 06:02 )             25.4             CAPILLARY BLOOD GLUCOSE        BLOOD CULTURE    RADIOLOGY & ADDITIONAL TESTS:    Imaging Personally Reviewed:  [ ] YES     Consultant(s) Notes Reviewed:      Care Discussed with Consultants/Other Providers:

## 2017-07-12 NOTE — DISCHARGE NOTE ADULT - CARE PROVIDER_API CALL
Arcelia Englishn  [address] [place] [address] [address]  [place] [id number]  Phone: [phone]  Fax: (   )    - Brown English  [address] [place] [address] [address]  [place] [address]  Phone: [phone]  Fax: (   )    -    Clinic, Podiatry  Phone: [phone]  Fax: (   )    -

## 2017-07-12 NOTE — DISCHARGE NOTE ADULT - CARE PLAN
Principal Discharge DX:	Complex regional pain syndrome type 1 of right lower extremity  Instructions for follow-up, activity and diet:	You were hospitalized for pain due to an injury you sustained in your right leg several weeks ago. There was concern for an infection because the pain and swelling did not improve while on antibiotics before you came to the hospital. You were treated briefly with IV antibiotics and you were evaluated by an infectious disease team. The MRI of your right leg was uploaded in our hospital and it does not appear that you have any signs of infection or fracture. You were also evaluated by a physical therapist while you were in the hospital who recommended that you could go home without the need for further physical therapy. Continue to take oxycodone as needed for pain and follow up with your primary care doctor within a week of discharge.  Secondary Diagnosis:	Essential hypertension  Instructions for follow-up, activity and diet:	Your blood pressure was well controlled while you were in the hospital. Continue to take your home blood pressure medications as indicated. Principal Discharge DX:	Complex regional pain syndrome type 1 of right lower extremity  Goal:	Improvement in pain  Instructions for follow-up, activity and diet:	You were hospitalized for pain due to an injury you sustained in your right leg several weeks ago. There was concern for an infection because the pain and swelling did not improve while on antibiotics before you came to the hospital. You were treated briefly with IV antibiotics and you were evaluated by an infectious disease team. The MRI of your right leg was uploaded in our hospital and it does not appear that you have any signs of infection or fracture. You were also evaluated by a physical therapist while you were in the hospital who recommended that you could go home without the need for further physical therapy. Continue to take oxycodone as needed for pain and follow up with your primary care doctor within a week of discharge.  Secondary Diagnosis:	Essential hypertension  Instructions for follow-up, activity and diet:	Your blood pressure was well controlled while you were in the hospital. Continue to take your home blood pressure medications as indicated. Principal Discharge DX:	Complex regional pain syndrome type 1 of right lower extremity  Goal:	Improvement in pain  Instructions for follow-up, activity and diet:	You were hospitalized for pain due to an injury you sustained in your right leg several weeks ago. There was concern for an infection because the pain and swelling did not improve while on antibiotics before you came to the hospital. You were treated briefly with IV antibiotics and you were evaluated by an infectious disease team. The MRI of your right leg was uploaded in our hospital and it does not appear that you have any signs of infection or fracture. You were also evaluated by a physical therapist while you were in the hospital who recommended that you could go home without the need for further physical therapy. Follow up with your primary care doctor within a week of discharge.   Take Ibuprofen 600 mg twice daily WITH meals, take gabapentin 100 mg three times daily, and take Fosamax 70 mg once a week on an empty stomach, sitting upright for an hour.  Secondary Diagnosis:	Essential hypertension  Instructions for follow-up, activity and diet:	Your blood pressure was well controlled while you were in the hospital. Continue to take your home blood pressure medications as indicated.

## 2017-07-12 NOTE — DISCHARGE NOTE ADULT - MEDICATION SUMMARY - MEDICATIONS TO TAKE
I will START or STAY ON the medications listed below when I get home from the hospital:    gabapentin 100 mg oral capsule  -- 1 cap(s) by mouth 3 times a day  -- It is very important that you take or use this exactly as directed.  Do not skip doses or discontinue unless directed by your doctor.  May cause drowsiness.  Alcohol may intensify this effect.  Use care when operating dangerous machinery.    -- Indication: For Complex regional pain syndrome type 1 of right lower extremity    Lopressor 50 mg oral tablet  -- 1 tab(s) by mouth 2 times a day  -- Indication: For Essential hypertension    alendronate 70 mg oral tablet  -- 1 tab(s) by mouth once a week  -- Do not take with milk, juice or antacids. Take with water.  Take medication on an empty stomach 1 hour before or 2 to 3 hours after a meal unless otherwise directed by your doctor.  Take with 8 oz. of plain water at least 30 min before first food/beverage/drug of the day. Do not lie down for 30 min.    -- Indication: For Complex regional pain syndrome type 1 of right lower extremity    amLODIPine 5 mg oral tablet  -- 1 tab(s) by mouth once a day  -- Indication: For Essential hypertension    furosemide 20 mg oral tablet  -- 1 tab(s) by mouth once a day  -- Indication: For Essential hypertension I will START or STAY ON the medications listed below when I get home from the hospital:    gabapentin 100 mg oral capsule  -- 1 cap(s) by mouth 3 times a day  -- It is very important that you take or use this exactly as directed.  Do not skip doses or discontinue unless directed by your doctor.  May cause drowsiness.  Alcohol may intensify this effect.  Use care when operating dangerous machinery.    -- Indication: For Neuropathic pain    Lopressor 50 mg oral tablet  -- 1 tab(s) by mouth 2 times a day  -- Indication: For Essential hypertension    alendronate 70 mg oral tablet  -- 1 tab(s) by mouth once a week  -- Do not take with milk, juice or antacids. Take with water.  Take medication on an empty stomach 1 hour before or 2 to 3 hours after a meal unless otherwise directed by your doctor.  Take with 8 oz. of plain water at least 30 min before first food/beverage/drug of the day. Do not lie down for 30 min.    -- Indication: For Need for prophylactic postmenopausal hormone replacement therapy    amLODIPine 5 mg oral tablet  -- 1 tab(s) by mouth once a day  -- Indication: For Essential hypertension    furosemide 20 mg oral tablet  -- 1 tab(s) by mouth once a day  -- Indication: For Essential hypertension

## 2017-07-12 NOTE — PROGRESS NOTE ADULT - ASSESSMENT
71 yo F with PMH of HTN presents for RLE swelling and pain for past month likely secondary to cellulitis, receiving empiric treatment with IV vancomycin.

## 2017-07-12 NOTE — DISCHARGE NOTE ADULT - ADDITIONAL INSTRUCTIONS
Please follow up with your PCP within a week of discharge. Please follow up with your PCP within a week of discharge.  Make an appointment with Podiatry clinic when you go home.

## 2017-07-12 NOTE — DISCHARGE NOTE ADULT - CONDITIONS AT DISCHARGE
pt alert and oriented, stable to discharge home. pt afebrile, vitals stable. patient medicated for right leg pain with good relief. IV will be removed. discharge instruction will be provided when family arrives.

## 2017-07-12 NOTE — PROGRESS NOTE ADULT - PROBLEM SELECTOR PLAN 1
-can be cellulitis secondary to break in skin, vs. complex regional pain syndrome, following a fall one month prior.   -we do not suspect deeper infection due to patient being afebrile, no leukocytosis, no drainage   - IV vancomycin started 7/9, will follow up with ID.  -U/S of RLE was negative for DVT  -c/w empirical coverage with vancomycin  -ESR and CRP elevated, suggestive of infection.  - triple phase bone scan not suggestive of complex regional pain syndrome  - blood cultures showing no growth to date.   - consider complex regional pain syndrome, obtain triple phase bone scan.  - Waiting for MRI results to be uploaded (outpatient record from 1-2 weeks ago, CD brought in by son). Initially thought to be cellulitis, patient's RLE hyperalgesia and swelling are consistent with complex regional pain syndrome, even though triple phase bone scan was not confirmatory.  Patient remains afebrile with negative blood cultures and unremarkable RLE X-ray and  MRI.  We will discontinue IV antibiotics and send home with the following pain relievers:  - Ibuprofen 600 mg BID (patient instructed to take with food)  - Gabapentin 100 mg TID  - Fosamax 70 mg weekly (patient instructed to take on empty stomach and stay upright)    Will send patient home with number to podiatry clinic for help with relieving weight bearing on the right foot.

## 2017-07-12 NOTE — PROGRESS NOTE ADULT - ATTENDING COMMENTS
ID input appreciated.  I reviewed the bone scan with Nuclear med chief.    he feels the scan is not c/w CRPS.   thus we must reconsider the infectious dx originally postulated.  family member to bring in MRI done 1 week ago- we will review for evidence of OM ([facility] todays scan is not c/w it) and occult fx.
still with severe LE pain.  swelling has decreased.  while she meets the [place] criteria for CRPS we cannot reconcile the markedly elevated ESR.    will ask ortho to render an opinion and radiology to review the outisde MRI we just obtained.
I suspect she may have CRPS given the timing, exam and lack of response to abx's.    however, the elevated biomarkers of inflammation raise concern.  will do literature search to see if such can occur with CRPS, c/w abx's for another 24 hrs until we can get a triple phase bone scan or cx's negative.

## 2017-07-12 NOTE — DISCHARGE NOTE ADULT - HOSPITAL COURSE
71 yo F with PMH of HTN presents for RLE swelling and pain for 6 weeks. Patient reports walking in a department store on May 26, 2017 when she tripped over a rack with her right leg and fell. She didn't notice any obvious cut on her right leg at the time. The next morning, patient endorses egg-size swelling and pain over the midshin of right leg. Patient was seen by PMD with xray noting soft tissue tear but no bone fracture or tear. Patient was started on 3 PO abx over the course of one month with no significant improvement in rt leg symptoms. She does not remember the first 2 abx but last abx was bactrim (started on Wednesday) and she was advised by PMD to come to ED for IV abx by Sunday (7/9) as her symptoms as has not improved. Patient reports the right foot pain as 5/10 during the day and 10/10 at night. She take 4 advil per day with adequately pain control only during the day. She denies fever, chill, n/v, abd pain, SOB, HA, dizziness dysuria, diarrhea, fatigue. No recent traveling, sick contact, hx of cellulitis in the past.     In ED, T98, HR 71, /63, HR 15, Sat 100 RA. Patient was given 1g of vancomycin.     Hospital course: Pt was admitted to the medicine service. Pt was started empirically on IV antibiotics for cellulitis and infectious diseases was consulted. Pt's ESR and CRP returned elevated. ID recommended continuing with IV antibiotics due to high concern for osteomyelitis. Pt's pain improved on oxycodone. Pt's son brought in an a CD with an MRI that pt had had 1-2 weeks before presentation. MRI was uploaded and reviewed by the radiology department and it did not show any signs of infection or fracture. 73 yo F with PMH of HTN presents for RLE swelling and pain for 6 weeks. Patient reports walking in a department store on May 26, 2017 when she tripped over a rack with her right leg and fell. She didn't notice any obvious cut on her right leg at the time. The next morning, patient endorses egg-size swelling and pain over the midshin of right leg. Patient was seen by PMD with xray noting soft tissue tear but no bone fracture or tear. Patient was started on 3 PO abx over the course of one month with no significant improvement in rt leg symptoms. She does not remember the first 2 abx but last abx was bactrim (started on Wednesday) and she was advised by PMD to come to ED for IV abx by Sunday (7/9) as her symptoms as has not improved. Patient reports the right foot pain as 5/10 during the day and 10/10 at night. She take 4 advil per day with adequately pain control only during the day. She denies fever, chill, n/v, abd pain, SOB, HA, dizziness dysuria, diarrhea, fatigue. No recent traveling, sick contact, hx of cellulitis in the past.     In ED, T98, HR 71, /63, HR 15, Sat 100 RA. Patient was given 1g of vancomycin.     Hospital course: Pt was admitted to the medicine service. Pt was started empirically on IV antibiotics for cellulitis and infectious diseases was consulted. Pt's ESR and CRP returned elevated. ID recommended continuing with IV antibiotics due to high concern for osteomyelitis. Pt's pain improved on oxycodone. Pt remained afebrile and white count remained normal. Pt's son brought in an a CD with an MRI that pt had had 1-2 weeks before presentation. MRI was uploaded and reviewed by the radiology department and it did not show any signs of infection or fracture. Pt's vancomycin was stopped and it was thought that pt may have complex regional pain syndrome s/p trauma since she did not appear to have cellulitis or fracture and was able to bear weight on her R leg and walk properly. Pt will be discharged to home with instructions to follow up with her PCP within 1 week of discharge to repeat ESR and CRP levels. 71 yo F with PMH of HTN presents for RLE swelling and pain for 6 weeks. Patient reports walking in a department store on May 26, 2017 when she tripped over a rack with her right leg and fell. She didn't notice any obvious cut on her right leg at the time. The next morning, patient endorses egg-size swelling and pain over the midshin of right leg. Patient was seen by PMD with xray noting soft tissue tear but no bone fracture or tear. Patient was started on 3 PO abx over the course of one month with no significant improvement in rt leg symptoms. She does not remember the first 2 abx but last abx was bactrim (started on Wednesday) and she was advised by PMD to come to ED for IV abx by Sunday (7/9) as her symptoms as has not improved. Patient reports the right foot pain as 5/10 during the day and 10/10 at night. She take 4 advil per day with adequately pain control only during the day. She denies fever, chill, n/v, abd pain, SOB, HA, dizziness dysuria, diarrhea, fatigue. No recent traveling, sick contact, hx of cellulitis in the past.     In ED, T98, HR 71, /63, HR 15, Sat 100 RA. Patient was given 1g of vancomycin.     Hospital course: Pt was admitted to the medicine service. Pt was started empirically on IV antibiotics for cellulitis and infectious diseases was consulted. Pt's ESR and CRP returned elevated. ID recommended continuing with IV antibiotics due to high concern for osteomyelitis. Pt's pain improved on oxycodone. Pt remained afebrile and white count remained normal. Pt's son brought in an a CD with an MRI that pt had had 1-2 weeks before presentation. MRI was uploaded and reviewed by the radiology department and it did not show any signs of infection or fracture. Pt's vancomycin was stopped and it was thought that pt may have complex regional pain syndrome s/p trauma since she did not appear to have cellulitis or fracture and was able to bear weight on her R leg and walk properly. Pt will be discharged to home with instructions to follow up with her PCP within 1 week of discharge to repeat ESR and CRP levels.    medicine attending addendum- she meets the [place] criteria for complex regional pain syndrome. we could not reconcile the elevated esr. there was no indication of infection clinically, by MRI or bone scan. thus we are treating for CRPS with NSAID, gabapentin, and fosamax. with plans to recheck ESR and clinical status in 1-2 weeks.

## 2017-07-12 NOTE — DISCHARGE NOTE ADULT - PATIENT PORTAL LINK FT
“You can access the [facility] Patient Portal, offered by [facility], by registering with the following website: [email or web address]”

## 2017-07-12 NOTE — DISCHARGE NOTE ADULT - PLAN OF CARE
You were hospitalized for pain due to an injury you sustained in your right leg several weeks ago. There was concern for an infection because the pain and swelling did not improve while on antibiotics before you came to the hospital. You were treated briefly with IV antibiotics and you were evaluated by an infectious disease team. The MRI of your right leg was uploaded in our hospital and it does not appear that you have any signs of infection or fracture. You were also evaluated by a physical therapist while you were in the hospital who recommended that you could go home without the need for further physical therapy. Continue to take oxycodone as needed for pain and follow up with your primary care doctor within a week of discharge. Your blood pressure was well controlled while you were in the hospital. Continue to take your home blood pressure medications as indicated. Improvement in pain You were hospitalized for pain due to an injury you sustained in your right leg several weeks ago. There was concern for an infection because the pain and swelling did not improve while on antibiotics before you came to the hospital. You were treated briefly with IV antibiotics and you were evaluated by an infectious disease team. The MRI of your right leg was uploaded in our hospital and it does not appear that you have any signs of infection or fracture. You were also evaluated by a physical therapist while you were in the hospital who recommended that you could go home without the need for further physical therapy. Follow up with your primary care doctor within a week of discharge.   Take Ibuprofen 600 mg twice daily WITH meals, take gabapentin 100 mg three times daily, and take Fosamax 70 mg once a week on an empty stomach, sitting upright for an hour.

## 2017-07-12 NOTE — DISCHARGE NOTE ADULT - MEDICATION SUMMARY - MEDICATIONS TO STOP TAKING
I will STOP taking the medications listed below when I get home from the hospital:    Bactrim  mg-160 mg oral tablet  -- 1 tab(s) by mouth 2 times a day

## 2017-07-12 NOTE — DISCHARGE NOTE ADULT - PROVIDER TOKENS
FREE:[LAST:[Tameka],FIRST:[Brown],PHONE:[phone]:[(   )    -],ADDRESS:[[address] [place] [address]]] FREE:[LAST:[Chasky],FIRST:[Brown],PHONE:[phone]],FAX:[(   )    -],ADDRESS:[[address] [place] [address]]],FREE:[LAST:[Clinic],FIRST:[Podiatry],PHONE:[phone]],FAX:[(   )    -]]

## 2017-07-14 LAB
BACTERIA BLD CULT: SIGNIFICANT CHANGE UP
BACTERIA BLD CULT: SIGNIFICANT CHANGE UP

## 2019-03-31 ENCOUNTER — INPATIENT (INPATIENT)
Facility: HOSPITAL | Age: 74
LOS: 4 days | Discharge: HOME | DRG: 812 | End: 2019-04-05
Attending: INTERNAL MEDICINE | Admitting: INTERNAL MEDICINE
Payer: COMMERCIAL

## 2019-03-31 VITALS — HEIGHT: 62 IN | WEIGHT: 199.96 LBS

## 2019-03-31 DIAGNOSIS — Z90.710 ACQUIRED ABSENCE OF BOTH CERVIX AND UTERUS: Chronic | ICD-10-CM

## 2019-03-31 DIAGNOSIS — D64.9 ANEMIA, UNSPECIFIED: ICD-10-CM

## 2019-03-31 DIAGNOSIS — R50.9 FEVER, UNSPECIFIED: ICD-10-CM

## 2019-03-31 DIAGNOSIS — Z29.9 ENCOUNTER FOR PROPHYLACTIC MEASURES, UNSPECIFIED: ICD-10-CM

## 2019-03-31 DIAGNOSIS — I10 ESSENTIAL (PRIMARY) HYPERTENSION: ICD-10-CM

## 2019-03-31 LAB
ALBUMIN SERPL ELPH-MCNC: 3.6 G/DL — SIGNIFICANT CHANGE UP (ref 3.5–5)
ALP SERPL-CCNC: 70 U/L — SIGNIFICANT CHANGE UP (ref 40–120)
ALT FLD-CCNC: 22 U/L DA — SIGNIFICANT CHANGE UP (ref 10–60)
ANION GAP SERPL CALC-SCNC: 7 MMOL/L — SIGNIFICANT CHANGE UP (ref 5–17)
APPEARANCE UR: CLEAR — SIGNIFICANT CHANGE UP
AST SERPL-CCNC: 24 U/L — SIGNIFICANT CHANGE UP (ref 10–40)
BASE EXCESS BLDV CALC-SCNC: 1.3 MMOL/L — SIGNIFICANT CHANGE UP (ref -2–2)
BASOPHILS # BLD AUTO: 0.02 K/UL — SIGNIFICANT CHANGE UP (ref 0–0.2)
BASOPHILS NFR BLD AUTO: 0.2 % — SIGNIFICANT CHANGE UP (ref 0–2)
BILIRUB SERPL-MCNC: 1.6 MG/DL — HIGH (ref 0.2–1.2)
BILIRUB UR-MCNC: ABNORMAL
BLOOD GAS COMMENTS, VENOUS: SIGNIFICANT CHANGE UP
BUN SERPL-MCNC: 15 MG/DL — SIGNIFICANT CHANGE UP (ref 7–18)
CALCIUM SERPL-MCNC: 8.6 MG/DL — SIGNIFICANT CHANGE UP (ref 8.4–10.5)
CHLORIDE SERPL-SCNC: 109 MMOL/L — HIGH (ref 96–108)
CO2 SERPL-SCNC: 24 MMOL/L — SIGNIFICANT CHANGE UP (ref 22–31)
COLOR SPEC: YELLOW — SIGNIFICANT CHANGE UP
CREAT SERPL-MCNC: 0.89 MG/DL — SIGNIFICANT CHANGE UP (ref 0.5–1.3)
DIFF PNL FLD: ABNORMAL
EOSINOPHIL # BLD AUTO: 0 K/UL — SIGNIFICANT CHANGE UP (ref 0–0.5)
EOSINOPHIL NFR BLD AUTO: 0 % — SIGNIFICANT CHANGE UP (ref 0–6)
GLUCOSE SERPL-MCNC: 137 MG/DL — HIGH (ref 70–99)
GLUCOSE UR QL: NEGATIVE — SIGNIFICANT CHANGE UP
HCO3 BLDV-SCNC: 25 MMOL/L — SIGNIFICANT CHANGE UP (ref 21–29)
HCT VFR BLD CALC: 20 % — CRITICAL LOW (ref 34.5–45)
HGB BLD-MCNC: 7 G/DL — CRITICAL LOW (ref 11.5–15.5)
HOROWITZ INDEX BLDV+IHG-RTO: 21 — SIGNIFICANT CHANGE UP
IMM GRANULOCYTES NFR BLD AUTO: 1.3 % — SIGNIFICANT CHANGE UP (ref 0–1.5)
INR BLD: 1.35 RATIO — HIGH (ref 0.88–1.16)
IRON SATN MFR SERPL: 16 % — SIGNIFICANT CHANGE UP (ref 15–50)
IRON SATN MFR SERPL: 33 UG/DL — LOW (ref 40–150)
KETONES UR-MCNC: ABNORMAL
LACTATE SERPL-SCNC: 0.9 MMOL/L — SIGNIFICANT CHANGE UP (ref 0.7–2)
LEUKOCYTE ESTERASE UR-ACNC: ABNORMAL
LYMPHOCYTES # BLD AUTO: 1.93 K/UL — SIGNIFICANT CHANGE UP (ref 1–3.3)
LYMPHOCYTES # BLD AUTO: 21.1 % — SIGNIFICANT CHANGE UP (ref 13–44)
MCHC RBC-ENTMCNC: 31.4 PG — SIGNIFICANT CHANGE UP (ref 27–34)
MCHC RBC-ENTMCNC: 35 GM/DL — SIGNIFICANT CHANGE UP (ref 32–36)
MCV RBC AUTO: 89.7 FL — SIGNIFICANT CHANGE UP (ref 80–100)
MONOCYTES # BLD AUTO: 2.04 K/UL — HIGH (ref 0–0.9)
MONOCYTES NFR BLD AUTO: 22.3 % — HIGH (ref 2–14)
NEUTROPHILS # BLD AUTO: 5.03 K/UL — SIGNIFICANT CHANGE UP (ref 1.8–7.4)
NEUTROPHILS NFR BLD AUTO: 55.1 % — SIGNIFICANT CHANGE UP (ref 43–77)
NITRITE UR-MCNC: NEGATIVE — SIGNIFICANT CHANGE UP
NRBC # BLD: 3 /100 WBCS — HIGH (ref 0–0)
OB PNL STL: NEGATIVE — SIGNIFICANT CHANGE UP
PCO2 BLDV: 38 MMHG — SIGNIFICANT CHANGE UP (ref 35–50)
PH BLDV: 7.43 — SIGNIFICANT CHANGE UP (ref 7.35–7.45)
PH UR: 5 — SIGNIFICANT CHANGE UP (ref 5–8)
PLATELET # BLD AUTO: 103 K/UL — LOW (ref 150–400)
PO2 BLDV: 36 MMHG — SIGNIFICANT CHANGE UP (ref 25–45)
POTASSIUM SERPL-MCNC: 4.1 MMOL/L — SIGNIFICANT CHANGE UP (ref 3.5–5.3)
POTASSIUM SERPL-SCNC: 4.1 MMOL/L — SIGNIFICANT CHANGE UP (ref 3.5–5.3)
PROT SERPL-MCNC: 8.5 G/DL — HIGH (ref 6–8.3)
PROT UR-MCNC: 100
PROTHROM AB SERPL-ACNC: 15.1 SEC — HIGH (ref 10–12.9)
RBC # BLD: 2.23 M/UL — LOW (ref 3.8–5.2)
RBC # FLD: 19.9 % — HIGH (ref 10.3–14.5)
SAO2 % BLDV: 70 % — SIGNIFICANT CHANGE UP (ref 67–88)
SODIUM SERPL-SCNC: 140 MMOL/L — SIGNIFICANT CHANGE UP (ref 135–145)
SP GR SPEC: 1.01 — SIGNIFICANT CHANGE UP (ref 1.01–1.02)
TIBC SERPL-MCNC: 209 UG/DL — LOW (ref 250–450)
TROPONIN I SERPL-MCNC: <0.015 NG/ML — SIGNIFICANT CHANGE UP (ref 0–0.04)
UIBC SERPL-MCNC: 176 UG/DL — SIGNIFICANT CHANGE UP (ref 110–370)
UROBILINOGEN FLD QL: 1
WBC # BLD: 9.14 K/UL — SIGNIFICANT CHANGE UP (ref 3.8–10.5)
WBC # FLD AUTO: 9.14 K/UL — SIGNIFICANT CHANGE UP (ref 3.8–10.5)

## 2019-03-31 PROCEDURE — 99223 1ST HOSP IP/OBS HIGH 75: CPT

## 2019-03-31 PROCEDURE — 99285 EMERGENCY DEPT VISIT HI MDM: CPT

## 2019-03-31 PROCEDURE — 71045 X-RAY EXAM CHEST 1 VIEW: CPT | Mod: 26

## 2019-03-31 RX ORDER — FUROSEMIDE 40 MG
40 TABLET ORAL ONCE
Qty: 0 | Refills: 0 | Status: COMPLETED | OUTPATIENT
Start: 2019-03-31 | End: 2019-04-01

## 2019-03-31 RX ORDER — PANTOPRAZOLE SODIUM 20 MG/1
40 TABLET, DELAYED RELEASE ORAL
Qty: 0 | Refills: 0 | Status: DISCONTINUED | OUTPATIENT
Start: 2019-03-31 | End: 2019-04-05

## 2019-03-31 NOTE — H&P ADULT - PROBLEM SELECTOR PLAN 3
takes amlodipine, but does not rememeber other medications  BP on lower side hold amlodipine  F/u Echo as patient was given LASIX for lower leg swelling

## 2019-03-31 NOTE — H&P ADULT - ASSESSMENT
75 y/o F from home lives with son, ambulates independently with PMHx of Htn, Rheumatic heart disease, external hemorrhoids, fibroids (s/p hysterectomy and chronic back pain presents to ED c/o shortness of breath x1 week, worsening today.    Patient is admitted for symptomatic anemia

## 2019-03-31 NOTE — ED ADULT NURSE NOTE - OBJECTIVE STATEMENT
pt is here for shortness of breath. pt state SOB on exertion and right upper back pain, denied trama or injury, denied N/V/D or fever, pt calm at this time.

## 2019-03-31 NOTE — ED ADULT NURSE NOTE - NSIMPLEMENTINTERV_GEN_ALL_ED
Implemented All Universal Safety Interventions:  [place] to call system. Call bell, personal items and telephone within reach. Instruct patient to call for assistance. Room bathroom lighting operational. Non-slip footwear when patient is off stretcher. Physically safe environment: no spills, clutter or unnecessary equipment. Stretcher in lowest position, wheels locked, appropriate side rails in place.

## 2019-03-31 NOTE — H&P ADULT - ATTENDING COMMENTS
Agree with hx as above  Case discussed with Dr Le- the medical resident    Vital Signs Last 24 Hrs  T(C): 37.1 (31 Mar 2019 23:58), Max: 38.6 (31 Mar 2019 17:53)  T(F): 98.8 (31 Mar 2019 23:58), Max: 101.5 (31 Mar 2019 17:53)  HR: 89 (31 Mar 2019 23:58) (83 - 92)  BP: 114/51 (31 Mar 2019 23:58) (114/43 - 153/77)  RR: 18 (31 Mar 2019 23:58) (18 - 20)  SpO2: 100% (31 Mar 2019 23:58) (100% - 100%)    Elderly woman, NAD presently at rest in bed, AAO X 3  Pale, anicteric,   CTA B/L; RRR S1S2 only  Soft, NTND BS +  No pedal edema      labs                        7.0    9.14  )-----------( 103      ( 31 Mar 2019 18:39 )             20.0     03-31    140  |  109  |  15  --------------------<  137<H>  4.1   |  24  |  0.89    Ca    8.6      31 Mar 2019 18:39    TPro  8.5<H>  /  Alb  3.6  /  TBili  1.6<H>  /  DBili  x   /  AST  24  /  ALT  22  /  AlkPhos  70  03-31    PT/INR - ( 31 Mar 2019 18:39 )   PT: 15.1 sec;   INR: 1.35 ratio      Fe - 33(low)  / TIBC - 209 ( low)  RVP -ve  FOBT  -ve    UA   microscopic hematuria    Impression  Elderly woman admitted with symptomatic anemia with high output heart failure. Of note, old cbc show low asymptomatic hgb of past admission thought patient states she has never been diagnosed with anemia in the past.  There is no clear cut acute or chronic blood loss anemia - No evidence of GI / in this post menopausal woman; though she has a hemorrhoid, she denies blood in her stool or while cleaning.  Also interesting is the mildly elevated total bilirubin in the setting of elevated LDH. Would check haptoglobin as well to r/o hemolytic anemia  retic counts is pending but BM suppression is not likely so far.  New fevers - with pulmonary  and URI symptoms - suggest a CAP     A/P  - Symptomatic anemia  - Chronic anemia -? anemia of chronic inflammation  - High output heart failure  - CAP    Plan  Admit to Medicine  Send blood for anemia work up  transfuse at least 1 unit of blood packed red cells with lasix  ECHO   Empiric antibiotics

## 2019-03-31 NOTE — ED PROVIDER NOTE - OBJECTIVE STATEMENT
75 y/o F pt with PMHx of HTN presents to ED c/o shortness of breath x1 week, worsening today. Pt also reports having chills and back pain today. Pt relays that shortness of breath is worse with walking, but not worse when lying down. Patient denies h/o kidney problems, bleeding, blood in stool, black/white/red stools, measured fever at home, sick contacts or any other complaints. PMD: Angelica.

## 2019-03-31 NOTE — H&P ADULT - HISTORY OF PRESENT ILLNESS
73 y/o F pt with PMHx of HTN presents to ED c/o shortness of breath x1 week, worsening today. Pt also reports having chills and back pain today. Pt relays that shortness of breath is worse with walking, but not worse when lying down. Patient denies h/o kidney problems, bleeding, blood in stool, black/white/red stools, measured fever at home, 73 y/o F from home lives with son, ambulates independently with PMHx of Htn, Rheumatic heart disease and chronic back pain presents to ED c/o shortness of breath x1 week, worsening today.    Patient states she developed sudden dyspnea on exertion and was unable to walk 2 blocks. Her associated Pt also reports having chills and back pain today. Pt relays that shortness of breath is worse with walking, but not worse when lying down. Patient denies h/o kidney problems, bleeding, blood in stool, black/white/red stools, measured fever at home, 73 y/o F from home lives with son, ambulates independently with PMHx of Htn, Rheumatic heart disease, external hemorrhoids, fibroids (s/p hysterectomy and chronic back pain presents to ED c/o shortness of breath x1 week, worsening today.    Patient states she developed sudden dyspnea on exertion and was unable to walk 2 blocks. Relieved by rest and lying down.  Her associated symptoms are increased somnolence, anorexia, dizziness and fatigue. Pt also reports having chills, runny nose and back pain today. Patient denies blood in stool, melena, hematuria, hematemesis, dysuria or any other complains. Patient never had endoscopy or colonoscopy. Patient takes 2 tablets of advil daily for 1 month for back pain    In ED, patient's vital signs were remarkable  for Temp of 101F, Labs were remarkable for H/H 7.0/20, CXR shows B/L pulmonary congestion. EKG NSR No ST-T wave inversion. OCcult negative    Goals of care: Full code     Of side note: Patient does not remember his pharmacy Called [facility] at [facility] [phone] but its closed. Primary team please confirm the medications

## 2019-03-31 NOTE — H&P ADULT - PROBLEM SELECTOR PLAN 1
p/w H/H of 7.0/20 MCV: 89.7with dyspnea, dizziness, somnolence, fatigue -- likely symptomatic normocytic anemia --> unknown etiology could be due to chronic bleeding from hemmorhoids as Rectal PE shows hemorrhoids  Occult negative although patient takes advil daily  F/u Anemia panel  Transfuse 2 PRBC with lasix  2 IV line  CBC monitoring p/w H/H of 7.0/20 MCV: 89.7with dyspnea, dizziness, somnolence, fatigue -- likely symptomatic normocytic anemia --> unknown etiology could be due to chronic bleeding from hemmorhoids as Rectal PE shows hemorrhoids  Occult negative although patient takes advil daily  Patient was having chronic low H/H as per Last admissions  F/u Anemia panel  Transfuse 2 PRBC with lasix  2 IV line  CBC monitoring p/w H/H of 7.0/20 MCV: 89.7with dyspnea, dizziness, somnolence, fatigue -- likely symptomatic normocytic anemia --> unknown etiology could be due to chronic bleeding from hemorrhoids as Rectal PE shows hemorrhoids  Occult negative although patient takes advil daily  Patient was having chronic low H/H as per Last admissions  F/u Anemia panel  Transfuse 2 PRBC with lasix  2 IV line  CBC monitoring p/w H/H of 7.0/20 MCV: 89.7with dyspnea, dizziness, somnolence, fatigue -- likely symptomatic normocytic anemia --> unknown etiology could be due to chronic bleeding from hemorrhoids as Rectal PE shows hemorrhoids  Occult negative although patient takes advil daily  Patient was having chronic low H/H as per Last admissions  F/u Anemia panel  Transfuse 2 PRBC with lasix  2 IV line  CBC monitoring  Heme onc: Dr. Moreno

## 2019-03-31 NOTE — H&P ADULT - NSHPPHYSICALEXAM_GEN_ALL_CORE
ICU Vital Signs Last 24 Hrs  T(C): 37.4 (31 Mar 2019 21:37), Max: 38.6 (31 Mar 2019 17:53)  T(F): 99.3 (31 Mar 2019 21:37), Max: 101.5 (31 Mar 2019 17:53)  HR: 83 (31 Mar 2019 21:37) (83 - 92)  BP: 114/43 (31 Mar 2019 21:37) (114/43 - 153/77)  BP(mean): --  ABP: --  ABP(mean): --  RR: 18 (31 Mar 2019 21:37) (18 - 20)  SpO2: 100% (31 Mar 2019 21:37) (100% - 100%)

## 2019-03-31 NOTE — H&P ADULT - PROBLEM SELECTOR PLAN 2
p/w weakness, runny nose, high grade fever, with normal WBC CXR shows B/L pulmonary venous congestion, questionable underlying PNA  UA negative for UTI   F/u Blood cultures    Will start antibiotic

## 2019-03-31 NOTE — H&P ADULT - PROBLEM SELECTOR PLAN 4
RISK                                                          Points  [  ] Previous VTE                                                3  [  ] Thrombophilia                                             2  [  ] Lower limb paralysis                                   2        (unable to hold up >15 seconds)    [  ] Current Cancer                                             2         (within 6 months)  [ x ] Immobilization > 24 hrs                              1  [  ] ICU/CCU stay > 24 hours                             1  [ x ] Age > 60                                                         1    IMPROVE VTE Score: 2  Will hold for  VTE prophylaxis due to concern of Low H/H  SCD   Protonix RISK                                                          Points  [  ] Previous VTE                                                3  [  ] Thrombophilia                                             2  [  ] Lower limb paralysis                                   2        (unable to hold up >15 seconds)    [  ] Current Cancer                                             2         (within 6 months)  [ x ] Immobilization > 24 hrs                              1  [  ] ICU/CCU stay > 24 hours                             1  [ x ] Age > 60                                                         1    IMPROVE VTE Score: 2  will start Heparin VTE prophylaxis as no signs of active bleeding  Protonix

## 2019-04-01 PROBLEM — I10 ESSENTIAL (PRIMARY) HYPERTENSION: Chronic | Status: ACTIVE | Noted: 2017-07-09

## 2019-04-01 LAB
ALBUMIN SERPL ELPH-MCNC: 3.3 G/DL — LOW (ref 3.5–5)
ALP SERPL-CCNC: 66 U/L — SIGNIFICANT CHANGE UP (ref 40–120)
ALT FLD-CCNC: 20 U/L DA — SIGNIFICANT CHANGE UP (ref 10–60)
ANION GAP SERPL CALC-SCNC: 8 MMOL/L — SIGNIFICANT CHANGE UP (ref 5–17)
AST SERPL-CCNC: 21 U/L — SIGNIFICANT CHANGE UP (ref 10–40)
BASOPHILS # BLD AUTO: 0 K/UL — SIGNIFICANT CHANGE UP (ref 0–0.2)
BASOPHILS # BLD AUTO: 0.02 K/UL — SIGNIFICANT CHANGE UP (ref 0–0.2)
BASOPHILS NFR BLD AUTO: 0 % — SIGNIFICANT CHANGE UP (ref 0–2)
BASOPHILS NFR BLD AUTO: 0.3 % — SIGNIFICANT CHANGE UP (ref 0–2)
BILIRUB DIRECT SERPL-MCNC: 0.5 MG/DL — HIGH (ref 0–0.2)
BILIRUB INDIRECT FLD-MCNC: 1.3 MG/DL — HIGH (ref 0.2–1)
BILIRUB SERPL-MCNC: 1.8 MG/DL — HIGH (ref 0.2–1.2)
BILIRUB SERPL-MCNC: 1.8 MG/DL — HIGH (ref 0.2–1.2)
BUN SERPL-MCNC: 15 MG/DL — SIGNIFICANT CHANGE UP (ref 7–18)
CALCIUM SERPL-MCNC: 8.6 MG/DL — SIGNIFICANT CHANGE UP (ref 8.4–10.5)
CHLORIDE SERPL-SCNC: 107 MMOL/L — SIGNIFICANT CHANGE UP (ref 96–108)
CHOLEST SERPL-MCNC: 107 MG/DL — SIGNIFICANT CHANGE UP (ref 10–199)
CO2 SERPL-SCNC: 24 MMOL/L — SIGNIFICANT CHANGE UP (ref 22–31)
CREAT SERPL-MCNC: 0.91 MG/DL — SIGNIFICANT CHANGE UP (ref 0.5–1.3)
CRP SERPL-MCNC: 9.4 MG/DL — HIGH (ref 0–0.4)
EOSINOPHIL # BLD AUTO: 0 K/UL — SIGNIFICANT CHANGE UP (ref 0–0.5)
EOSINOPHIL # BLD AUTO: 0.01 K/UL — SIGNIFICANT CHANGE UP (ref 0–0.5)
EOSINOPHIL NFR BLD AUTO: 0 % — SIGNIFICANT CHANGE UP (ref 0–6)
EOSINOPHIL NFR BLD AUTO: 0.1 % — SIGNIFICANT CHANGE UP (ref 0–6)
FERRITIN SERPL-MCNC: 635 NG/ML — HIGH (ref 15–150)
FLU A RESULT: SIGNIFICANT CHANGE UP
FLU A RESULT: SIGNIFICANT CHANGE UP
FLUAV AG NPH QL: SIGNIFICANT CHANGE UP
FLUBV AG NPH QL: SIGNIFICANT CHANGE UP
FOLATE SERPL-MCNC: >20 NG/ML — SIGNIFICANT CHANGE UP
GLUCOSE SERPL-MCNC: 154 MG/DL — HIGH (ref 70–99)
HAPTOGLOB SERPL-MCNC: 46 MG/DL — SIGNIFICANT CHANGE UP (ref 34–200)
HBA1C BLD-MCNC: 4.8 % — SIGNIFICANT CHANGE UP (ref 4–5.6)
HCT VFR BLD CALC: 21.4 % — LOW (ref 34.5–45)
HCT VFR BLD CALC: 23.1 % — LOW (ref 34.5–45)
HCV AB S/CO SERPL IA: 3.16 S/CO — HIGH (ref 0–0.79)
HCV AB SERPL-IMP: ABNORMAL
HDLC SERPL-MCNC: 29 MG/DL — LOW
HGB BLD-MCNC: 7.5 G/DL — LOW (ref 11.5–15.5)
HGB BLD-MCNC: 8.1 G/DL — LOW (ref 11.5–15.5)
IMM GRANULOCYTES NFR BLD AUTO: 0.9 % — SIGNIFICANT CHANGE UP (ref 0–1.5)
LDH SERPL L TO P-CCNC: 266 U/L — HIGH (ref 120–225)
LIPID PNL WITH DIRECT LDL SERPL: 54 MG/DL — SIGNIFICANT CHANGE UP
LYMPHOCYTES # BLD AUTO: 1.85 K/UL — SIGNIFICANT CHANGE UP (ref 1–3.3)
LYMPHOCYTES # BLD AUTO: 2.46 K/UL — SIGNIFICANT CHANGE UP (ref 1–3.3)
LYMPHOCYTES # BLD AUTO: 20 % — SIGNIFICANT CHANGE UP (ref 13–44)
LYMPHOCYTES # BLD AUTO: 31.6 % — SIGNIFICANT CHANGE UP (ref 13–44)
MAGNESIUM SERPL-MCNC: 2.2 MG/DL — SIGNIFICANT CHANGE UP (ref 1.6–2.6)
MCHC RBC-ENTMCNC: 30.1 PG — SIGNIFICANT CHANGE UP (ref 27–34)
MCHC RBC-ENTMCNC: 31 PG — SIGNIFICANT CHANGE UP (ref 27–34)
MCHC RBC-ENTMCNC: 35 GM/DL — SIGNIFICANT CHANGE UP (ref 32–36)
MCHC RBC-ENTMCNC: 35.1 GM/DL — SIGNIFICANT CHANGE UP (ref 32–36)
MCV RBC AUTO: 85.9 FL — SIGNIFICANT CHANGE UP (ref 80–100)
MCV RBC AUTO: 88.4 FL — SIGNIFICANT CHANGE UP (ref 80–100)
MONOCYTES # BLD AUTO: 1.61 K/UL — HIGH (ref 0–0.9)
MONOCYTES # BLD AUTO: 2.5 K/UL — HIGH (ref 0–0.9)
MONOCYTES NFR BLD AUTO: 20.7 % — HIGH (ref 2–14)
MONOCYTES NFR BLD AUTO: 27 % — HIGH (ref 2–14)
NEUTROPHILS # BLD AUTO: 3.62 K/UL — SIGNIFICANT CHANGE UP (ref 1.8–7.4)
NEUTROPHILS # BLD AUTO: 4.91 K/UL — SIGNIFICANT CHANGE UP (ref 1.8–7.4)
NEUTROPHILS NFR BLD AUTO: 46.4 % — SIGNIFICANT CHANGE UP (ref 43–77)
NEUTROPHILS NFR BLD AUTO: 53 % — SIGNIFICANT CHANGE UP (ref 43–77)
NRBC # BLD: 2 /100 WBCS — HIGH (ref 0–0)
NT-PROBNP SERPL-SCNC: 21 PG/ML — SIGNIFICANT CHANGE UP (ref 0–450)
PHOSPHATE SERPL-MCNC: 2.8 MG/DL — SIGNIFICANT CHANGE UP (ref 2.5–4.5)
PLATELET # BLD AUTO: 83 K/UL — LOW (ref 150–400)
PLATELET # BLD AUTO: 90 K/UL — LOW (ref 150–400)
POTASSIUM SERPL-MCNC: 3.5 MMOL/L — SIGNIFICANT CHANGE UP (ref 3.5–5.3)
POTASSIUM SERPL-SCNC: 3.5 MMOL/L — SIGNIFICANT CHANGE UP (ref 3.5–5.3)
PROCALCITONIN SERPL-MCNC: 0.3 NG/ML — HIGH (ref 0.02–0.1)
PROT SERPL-MCNC: 8.3 G/DL — SIGNIFICANT CHANGE UP (ref 6–8.3)
RBC # BLD: 2.42 M/UL — LOW (ref 3.8–5.2)
RBC # BLD: 2.69 M/UL — LOW (ref 3.8–5.2)
RBC # BLD: 2.77 M/UL — LOW (ref 3.8–5.2)
RBC # FLD: 18.8 % — HIGH (ref 10.3–14.5)
RBC # FLD: 19.2 % — HIGH (ref 10.3–14.5)
RETICS #: 37.7 K/UL — SIGNIFICANT CHANGE UP (ref 25–125)
RETICS/RBC NFR: 1.4 % — SIGNIFICANT CHANGE UP (ref 0.5–2.5)
RSV RESULT: SIGNIFICANT CHANGE UP
RSV RNA RESP QL NAA+PROBE: SIGNIFICANT CHANGE UP
SODIUM SERPL-SCNC: 139 MMOL/L — SIGNIFICANT CHANGE UP (ref 135–145)
TOTAL CHOLESTEROL/HDL RATIO MEASUREMENT: 3.7 RATIO — SIGNIFICANT CHANGE UP (ref 3.3–7.1)
TRANSFERRIN SERPL-MCNC: 164 MG/DL — LOW (ref 200–360)
TRIGL SERPL-MCNC: 120 MG/DL — SIGNIFICANT CHANGE UP (ref 10–149)
TSH SERPL-MCNC: 2.11 UU/ML — SIGNIFICANT CHANGE UP (ref 0.34–4.82)
VIT B12 SERPL-MCNC: 845 PG/ML — SIGNIFICANT CHANGE UP (ref 232–1245)
WBC # BLD: 7.96 K/UL — SIGNIFICANT CHANGE UP (ref 3.8–10.5)
WBC # BLD: 9.26 K/UL — SIGNIFICANT CHANGE UP (ref 3.8–10.5)
WBC # FLD AUTO: 7.96 K/UL — SIGNIFICANT CHANGE UP (ref 3.8–10.5)
WBC # FLD AUTO: 9.26 K/UL — SIGNIFICANT CHANGE UP (ref 3.8–10.5)

## 2019-04-01 PROCEDURE — 99233 SBSQ HOSP IP/OBS HIGH 50: CPT | Mod: GC

## 2019-04-01 RX ORDER — ACETAMINOPHEN 500 MG
650 TABLET ORAL EVERY 6 HOURS
Qty: 0 | Refills: 0 | Status: DISCONTINUED | OUTPATIENT
Start: 2019-04-01 | End: 2019-04-05

## 2019-04-01 RX ORDER — AZITHROMYCIN 500 MG/1
500 TABLET, FILM COATED ORAL EVERY 24 HOURS
Qty: 0 | Refills: 0 | Status: DISCONTINUED | OUTPATIENT
Start: 2019-04-01 | End: 2019-04-04

## 2019-04-01 RX ORDER — CEFTRIAXONE 500 MG/1
1 INJECTION, POWDER, FOR SOLUTION INTRAMUSCULAR; INTRAVENOUS EVERY 24 HOURS
Qty: 0 | Refills: 0 | Status: DISCONTINUED | OUTPATIENT
Start: 2019-04-01 | End: 2019-04-04

## 2019-04-01 RX ORDER — FUROSEMIDE 40 MG
20 TABLET ORAL ONCE
Qty: 0 | Refills: 0 | Status: COMPLETED | OUTPATIENT
Start: 2019-04-01 | End: 2019-04-01

## 2019-04-01 RX ORDER — HEPARIN SODIUM 5000 [USP'U]/ML
5000 INJECTION INTRAVENOUS; SUBCUTANEOUS EVERY 8 HOURS
Qty: 0 | Refills: 0 | Status: DISCONTINUED | OUTPATIENT
Start: 2019-04-01 | End: 2019-04-05

## 2019-04-01 RX ADMIN — Medication 20 MILLIGRAM(S): at 18:51

## 2019-04-01 RX ADMIN — Medication 650 MILLIGRAM(S): at 06:28

## 2019-04-01 RX ADMIN — CEFTRIAXONE 100 GRAM(S): 500 INJECTION, POWDER, FOR SOLUTION INTRAMUSCULAR; INTRAVENOUS at 23:30

## 2019-04-01 RX ADMIN — HEPARIN SODIUM 5000 UNIT(S): 5000 INJECTION INTRAVENOUS; SUBCUTANEOUS at 06:31

## 2019-04-01 RX ADMIN — HEPARIN SODIUM 5000 UNIT(S): 5000 INJECTION INTRAVENOUS; SUBCUTANEOUS at 22:43

## 2019-04-01 RX ADMIN — Medication 650 MILLIGRAM(S): at 12:28

## 2019-04-01 RX ADMIN — Medication 40 MILLIGRAM(S): at 01:13

## 2019-04-01 RX ADMIN — AZITHROMYCIN 250 MILLIGRAM(S): 500 TABLET, FILM COATED ORAL at 12:34

## 2019-04-01 RX ADMIN — PANTOPRAZOLE SODIUM 40 MILLIGRAM(S): 20 TABLET, DELAYED RELEASE ORAL at 07:37

## 2019-04-01 NOTE — CONSULT NOTE ADULT - PROBLEM SELECTOR RECOMMENDATION 2
fever w/u in progress  infection can do it,  she had rheumatic heart disease, will watch for SBE  other causes can be lymphoma

## 2019-04-01 NOTE — CONSULT NOTE ADULT - SUBJECTIVE AND OBJECTIVE BOX
Patient is a 74y old  Female who presents with a chief complaint of Shortness of breath (31 Mar 2019 21:05)      HPI:  73 y/o F from home lives with son, ambulates independently with PMHx of Htn, Rheumatic heart disease, external hemorrhoids, fibroids (s/p hysterectomy and chronic back pain presents to ED c/o shortness of breath x1 week, worsening today.    Patient states she developed sudden dyspnea on exertion and was unable to walk 2 blocks. Relieved by rest and lying down.  Her associated symptoms are increased somnolence, anorexia, dizziness and fatigue. Pt also reports having chills, runny nose and back pain today. Patient denies blood in stool, melena, hematuria, hematemesis, dysuria or any other complains. Patient never had endoscopy or colonoscopy. Patient takes 2 tablets of advil daily for 1 month for back pain  she said she has never been anemic.  no pain, but has constipation.    In ED, patient's vital signs were remarkable  for Temp of 101F, Labs were remarkable for H/H 7.0/20, CXR shows B/L pulmonary congestion. EKG NSR No ST-T wave inversion. OCcult negative    Goals of care: Full code     Of side note: Patient does not remember his pharmacy Called [facility] at [facility] [phone] but its closed. Primary team please confirm the medications (31 Mar 2019 21:05)       ROS:  Negative except for:    PAST MEDICAL & SURGICAL HISTORY:  Fibroids  H/O rheumatic heart disease  Essential hypertension  H/O abdominal hysterectomy      SOCIAL HISTORY:    FAMILY HISTORY:  FH: breast cancer: sISTER      MEDICATIONS  (STANDING):  azithromycin  IVPB 500 milliGRAM(s) IV Intermittent every 24 hours  cefTRIAXone   IVPB 1 Gram(s) IV Intermittent every 24 hours  heparin  Injectable 5000 Unit(s) SubCutaneous every 8 hours  pantoprazole    Tablet 40 milliGRAM(s) Oral before breakfast    MEDICATIONS  (PRN):  acetaminophen   Tablet .. 650 milliGRAM(s) Oral every 6 hours PRN Temp greater or equal to 38C (100.4F), Mild Pain (1 - 3), Moderate Pain (4 - 6)      Allergies    No Known Allergies    Intolerances        Vital Signs Last 24 Hrs  T(C): 37.1 (01 Apr 2019 18:33), Max: 38.4 (01 Apr 2019 06:05)  T(F): 98.7 (01 Apr 2019 18:33), Max: 101.2 (01 Apr 2019 06:05)  HR: 92 (01 Apr 2019 18:33) (83 - 98)  BP: 129/49 (01 Apr 2019 18:33) (113/63 - 158/70)  BP(mean): --  RR: 18 (01 Apr 2019 18:33) (16 - 18)  SpO2: 98% (01 Apr 2019 18:33) (98% - 100%)    PHYSICAL EXAM  General: adult in NAD  HEENT: clear oropharynx, anicteric sclera, pink conjunctiva  Neck: supple  CV: normal S1/S2 with no murmur rubs or gallops  Lungs: positive air movement b/l ant lungs,clear to auscultation, no wheezes, no rales  Abdomen: soft non-tender non-distended, no hepatosplenomegaly  Ext: no clubbing cyanosis or edema  Skin: no rashes and no petechiae  Neuro: alert and oriented X 4, no focal deficits      LABS:                          8.1    7.96  )-----------( 83       ( 01 Apr 2019 16:19 )             23.1         Mean Cell Volume : x  Mean Cell Hemoglobin : x  Mean Cell Hemoglobin Concentration : x  Auto Neutrophil # : 3.62 K/uL  Auto Lymphocyte # : 2.46 K/uL  Auto Monocyte # : 1.61 K/uL  Auto Eosinophil # : 0.01 K/uL  Auto Basophil # : 0.02 K/uL  Auto Neutrophil % : 46.4 %  Auto Lymphocyte % : 31.6 %  Auto Monocyte % : 20.7 %  Auto Eosinophil % : 0.1 %  Auto Basophil % : 0.3 %      Serial CBC's  04-01 @ 16:19  Hct-23.1 / Hgb-8.1 / Plat-83 / RBC-2.69 / WBC-7.96  Serial CBC's  04-01 @ 12:29  Hct--- / Hgb--- / Plat--- / RBC-2.77 / WBC---  Serial CBC's  04-01 @ 06:17  Hct-21.4 / Hgb-7.5 / Plat-90 / RBC-2.42 / WBC-9.26  Serial CBC's  03-31 @ 18:39  Hct-20.0 / Hgb-7.0 / Plat-103 / RBC-2.23 / WBC-9.14      04-01    139  |  107  |  15  ----------------------------<  154<H>  3.5   |  24  |  0.91    Ca    8.6      01 Apr 2019 06:17  Phos  2.8     04-01  Mg     2.2     04-01    TPro  8.3  /  Alb  3.3<L>  /  TBili  1.8<H>  /  DBili  0.5<H>  /  AST  21  /  ALT  20  /  AlkPhos  66  04-01      PT/INR - ( 31 Mar 2019 18:39 )   PT: 15.1 sec;   INR: 1.35 ratio             Reticulocyte Percent: 1.4 % (04-01 @ 12:29)  Ferritin, Serum: 635 ng/mL (04-01 @ 11:46)  Folate, Serum: >20.0 ng/mL (04-01 @ 11:34)  Vitamin B12, Serum: 845 pg/mL (04-01 @ 11:34)  Iron - Total Binding Capacity.: 209 ug/dL (03-31 @ 21:24)              BLOOD SMEAR INTERPRETATION:       RADIOLOGY & ADDITIONAL STUDIES:  < from: Xray Chest 1 View-PORTABLE IMMEDIATE (03.31.19 @ 19:08) >  INTERPRETATION:  CLINICAL STATEMENT: Chest pain.    TECHNIQUE: AP view of the chest.    COMPARISON: None available.    FINDINGS/  IMPRESSION:  Mild increased interstitial lung markings. Atelectasis lung bases.    Nonspecific right paratracheal density noted. Follow-up recommended.    No pleural effusion.        Heart size cannot be accurately assessed in this projection, but appear   enlarged.    < end of copied text >

## 2019-04-02 DIAGNOSIS — B19.20 UNSPECIFIED VIRAL HEPATITIS C WITHOUT HEPATIC COMA: ICD-10-CM

## 2019-04-02 DIAGNOSIS — R00.2 PALPITATIONS: ICD-10-CM

## 2019-04-02 DIAGNOSIS — D64.9 ANEMIA, UNSPECIFIED: ICD-10-CM

## 2019-04-02 DIAGNOSIS — J18.9 PNEUMONIA, UNSPECIFIED ORGANISM: ICD-10-CM

## 2019-04-02 DIAGNOSIS — D69.6 THROMBOCYTOPENIA, UNSPECIFIED: ICD-10-CM

## 2019-04-02 LAB
% ALBUMIN: 45.2 % — SIGNIFICANT CHANGE UP
% ALPHA 1: 6.3 % — SIGNIFICANT CHANGE UP
% ALPHA 2: 7.9 % — SIGNIFICANT CHANGE UP
% BETA: 13.8 % — SIGNIFICANT CHANGE UP
% GAMMA: 26.8 % — SIGNIFICANT CHANGE UP
ALBUMIN SERPL ELPH-MCNC: 3.7 G/DL — SIGNIFICANT CHANGE UP (ref 3.6–5.5)
ALBUMIN/GLOB SERPL ELPH: 0.8 RATIO — SIGNIFICANT CHANGE UP
ALPHA1 GLOB SERPL ELPH-MCNC: 0.5 G/DL — HIGH (ref 0.1–0.4)
ALPHA2 GLOB SERPL ELPH-MCNC: 0.6 G/DL — SIGNIFICANT CHANGE UP (ref 0.5–1)
B-GLOBULIN SERPL ELPH-MCNC: 1.1 G/DL — HIGH (ref 0.5–1)
ERYTHROCYTE [SEDIMENTATION RATE] IN BLOOD: >140 — SIGNIFICANT CHANGE UP (ref 0–20)
GAMMA GLOBULIN: 2.2 G/DL — HIGH (ref 0.6–1.6)
HAPTOGLOB SERPL-MCNC: 81 MG/DL — SIGNIFICANT CHANGE UP (ref 34–200)
HCT VFR BLD CALC: 25.8 % — LOW (ref 34.5–45)
HGB BLD-MCNC: 9 G/DL — LOW (ref 11.5–15.5)
INTERPRETATION SERPL IFE-IMP: SIGNIFICANT CHANGE UP
KAPPA LC SER QL IFE: 8.5 MG/DL — HIGH (ref 0.33–1.94)
KAPPA/LAMBDA FREE LIGHT CHAIN RATIO, SERUM: 1.7 RATIO — HIGH (ref 0.26–1.65)
LAMBDA LC SER QL IFE: 4.99 MG/DL — HIGH (ref 0.57–2.63)
LDH SERPL L TO P-CCNC: 360 U/L — HIGH (ref 120–225)
MCHC RBC-ENTMCNC: 30 PG — SIGNIFICANT CHANGE UP (ref 27–34)
MCHC RBC-ENTMCNC: 34.9 GM/DL — SIGNIFICANT CHANGE UP (ref 32–36)
MCV RBC AUTO: 86 FL — SIGNIFICANT CHANGE UP (ref 80–100)
NRBC # BLD: 2 /100 WBCS — HIGH (ref 0–0)
PLATELET # BLD AUTO: 95 K/UL — LOW (ref 150–400)
PROT PATTERN SERPL ELPH-IMP: SIGNIFICANT CHANGE UP
PROT SERPL-MCNC: 8.1 G/DL — SIGNIFICANT CHANGE UP (ref 6–8.3)
PROT SERPL-MCNC: 8.1 G/DL — SIGNIFICANT CHANGE UP (ref 6–8.3)
RBC # BLD: 3 M/UL — LOW (ref 3.8–5.2)
RBC # BLD: 3 M/UL — LOW (ref 3.8–5.2)
RBC # FLD: 19.2 % — HIGH (ref 10.3–14.5)
RETICS #: 34.8 K/UL — SIGNIFICANT CHANGE UP (ref 25–125)
RETICS/RBC NFR: 1.2 % — SIGNIFICANT CHANGE UP (ref 0.5–2.5)
WBC # BLD: 7 K/UL — SIGNIFICANT CHANGE UP (ref 3.8–10.5)
WBC # FLD AUTO: 7 K/UL — SIGNIFICANT CHANGE UP (ref 3.8–10.5)

## 2019-04-02 PROCEDURE — 74176 CT ABD & PELVIS W/O CONTRAST: CPT | Mod: 26

## 2019-04-02 PROCEDURE — 99233 SBSQ HOSP IP/OBS HIGH 50: CPT | Mod: GC

## 2019-04-02 RX ORDER — SENNA PLUS 8.6 MG/1
2 TABLET ORAL AT BEDTIME
Qty: 0 | Refills: 0 | Status: DISCONTINUED | OUTPATIENT
Start: 2019-04-02 | End: 2019-04-05

## 2019-04-02 RX ORDER — LACTULOSE 10 G/15ML
10 SOLUTION ORAL
Qty: 0 | Refills: 0 | Status: DISCONTINUED | OUTPATIENT
Start: 2019-04-02 | End: 2019-04-02

## 2019-04-02 RX ORDER — DOCUSATE SODIUM 100 MG
100 CAPSULE ORAL
Qty: 0 | Refills: 0 | Status: DISCONTINUED | OUTPATIENT
Start: 2019-04-02 | End: 2019-04-05

## 2019-04-02 RX ADMIN — HEPARIN SODIUM 5000 UNIT(S): 5000 INJECTION INTRAVENOUS; SUBCUTANEOUS at 15:20

## 2019-04-02 RX ADMIN — SENNA PLUS 2 TABLET(S): 8.6 TABLET ORAL at 23:06

## 2019-04-02 RX ADMIN — Medication 100 MILLIGRAM(S): at 17:16

## 2019-04-02 RX ADMIN — CEFTRIAXONE 100 GRAM(S): 500 INJECTION, POWDER, FOR SOLUTION INTRAMUSCULAR; INTRAVENOUS at 23:07

## 2019-04-02 RX ADMIN — AZITHROMYCIN 250 MILLIGRAM(S): 500 TABLET, FILM COATED ORAL at 16:38

## 2019-04-02 RX ADMIN — HEPARIN SODIUM 5000 UNIT(S): 5000 INJECTION INTRAVENOUS; SUBCUTANEOUS at 23:06

## 2019-04-02 RX ADMIN — PANTOPRAZOLE SODIUM 40 MILLIGRAM(S): 20 TABLET, DELAYED RELEASE ORAL at 05:38

## 2019-04-02 RX ADMIN — HEPARIN SODIUM 5000 UNIT(S): 5000 INJECTION INTRAVENOUS; SUBCUTANEOUS at 05:36

## 2019-04-02 NOTE — PROGRESS NOTE ADULT - PROBLEM SELECTOR PLAN 5
RISK                                                          Points  [  ] Previous VTE                                                3  [  ] Thrombophilia                                             2  [  ] Lower limb paralysis                                   2        (unable to hold up >15 seconds)    [  ] Current Cancer                                             2         (within 6 months)  [ x ] Immobilization > 24 hrs                              1  [  ] ICU/CCU stay > 24 hours                             1  [ x ] Age > 60                                                         1    IMPROVE VTE Score: 2  will start Heparin VTE prophylaxis as no signs of active bleeding  Protonix

## 2019-04-02 NOTE — PROGRESS NOTE ADULT - SUBJECTIVE AND OBJECTIVE BOX
feel better  fever is gone  no chills  no sob, cough, no diarrhea  had some pain at mid back, but resolved after changing the bed.    ROS:  Negative except for:    MEDICATIONS  (STANDING):  azithromycin  IVPB 500 milliGRAM(s) IV Intermittent every 24 hours  cefTRIAXone   IVPB 1 Gram(s) IV Intermittent every 24 hours  heparin  Injectable 5000 Unit(s) SubCutaneous every 8 hours  pantoprazole    Tablet 40 milliGRAM(s) Oral before breakfast    MEDICATIONS  (PRN):  acetaminophen   Tablet .. 650 milliGRAM(s) Oral every 6 hours PRN Temp greater or equal to 38C (100.4F), Mild Pain (1 - 3), Moderate Pain (4 - 6)      Allergies    No Known Allergies    Intolerances        Vital Signs Last 24 Hrs  T(C): 36.7 (02 Apr 2019 07:32), Max: 37.6 (01 Apr 2019 20:43)  T(F): 98.1 (02 Apr 2019 07:32), Max: 99.7 (01 Apr 2019 20:43)  HR: 86 (02 Apr 2019 07:32) (80 - 96)  BP: 118/60 (02 Apr 2019 07:32) (114/69 - 158/70)  BP(mean): --  RR: 16 (02 Apr 2019 07:32) (16 - 20)  SpO2: 100% (02 Apr 2019 07:32) (98% - 100%)    PHYSICAL EXAM  General: adult in NAD  HEENT: clear oropharynx, anicteric sclera, pink conjunctiva  Neck: supple  CV: normal S1/S2 with no murmur rubs or gallops  Lungs: positive air movement b/l ant lungs,clear to auscultation, no wheezes, no rales  Abdomen: soft non-tender non-distended, no hepatosplenomegaly  Ext: no clubbing cyanosis or edema  Skin: no rashes and no petechiae  Neuro: alert and oriented X 4, no focal deficits  LABS:                          9.0    7.00  )-----------( 95       ( 02 Apr 2019 06:34 )             25.8         Mean Cell Volume : 86.0 fl  Mean Cell Hemoglobin : 30.0 pg  Mean Cell Hemoglobin Concentration : 34.9 gm/dL  Auto Neutrophil # : x  Auto Lymphocyte # : x  Auto Monocyte # : x  Auto Eosinophil # : x  Auto Basophil # : x  Auto Neutrophil % : x  Auto Lymphocyte % : x  Auto Monocyte % : x  Auto Eosinophil % : x  Auto Basophil % : x    Serial CBC  Hematocrit 25.8  Hemoglobin 9.0  Plat 95  RBC 3.00  WBC 7.00  Serial CBC  Hematocrit 23.1  Hemoglobin 8.1  Plat 83  RBC 2.69  WBC 7.96  Serial CBC  Hematocrit --  Hemoglobin --  Plat --  RBC 2.77  WBC --  Serial CBC  Hematocrit 21.4  Hemoglobin 7.5  Plat 90  RBC 2.42  WBC 9.26  Serial CBC  Hematocrit 20.0  Hemoglobin 7.0  Plat 103  RBC 2.23  WBC 9.14    04-01    139  |  107  |  15  ----------------------------<  154<H>  3.5   |  24  |  0.91    Ca    8.6      01 Apr 2019 06:17  Phos  2.8     04-01  Mg     2.2     04-01    TPro  8.3  /  Alb  3.3<L>  /  TBili  1.8<H>  /  DBili  0.5<H>  /  AST  21  /  ALT  20  /  AlkPhos  66  04-01      PT/INR - ( 31 Mar 2019 18:39 )   PT: 15.1 sec;   INR: 1.35 ratio             Reticulocyte Percent: 1.2 % (04-02 @ 06:34)  Reticulocyte Percent: 1.4 % (04-01 @ 12:29)  Ferritin, Serum: 635 ng/mL (04-01 @ 11:46)  Folate, Serum: >20.0 ng/mL (04-01 @ 11:34)  Vitamin B12, Serum: 845 pg/mL (04-01 @ 11:34)  Iron - Total Binding Capacity.: 209 ug/dL (03-31 @ 21:24)            BLOOD SMEAR INTERPRETATION:       RADIOLOGY & ADDITIONAL STUDIES:

## 2019-04-02 NOTE — PROGRESS NOTE ADULT - PROBLEM SELECTOR PLAN 1
?acute onset  it is better today  w/u in progress  no hemolysis so far  fever, chills are gone  agree to have CT abd  ESR very high plus ?hep C

## 2019-04-02 NOTE — PROGRESS NOTE ADULT - PROBLEM SELECTOR PLAN 1
-Patient presented with sob, Hb on admission 7. s/p 2 PRBC  -Hb- 9  Stool occult negative  -Normocytic, normochromic anemia, no hemolysis- haptoglobin normal.  -Anemia panel- anemia of chronic disease.   -SPEP testing.  -CT abdomen with oral contrast- limited study, hiatal hernia, distal esophageal thickening. .

## 2019-04-02 NOTE — PROGRESS NOTE ADULT - SUBJECTIVE AND OBJECTIVE BOX
PGY 1 Note discussed with supervising resident and primary attending    Patient is a 74y old  Female who presents with a chief complaint of shortness of breath (2019 10:04)      INTERVAL HPI/OVERNIGHT EVENTS: no events noted overnight.    MEDICATIONS  (STANDING):  azithromycin  IVPB 500 milliGRAM(s) IV Intermittent every 24 hours  cefTRIAXone   IVPB 1 Gram(s) IV Intermittent every 24 hours  heparin  Injectable 5000 Unit(s) SubCutaneous every 8 hours  pantoprazole    Tablet 40 milliGRAM(s) Oral before breakfast    MEDICATIONS  (PRN):  acetaminophen   Tablet .. 650 milliGRAM(s) Oral every 6 hours PRN Temp greater or equal to 38C (100.4F), Mild Pain (1 - 3), Moderate Pain (4 - 6)      __________________________________________________  REVIEW OF SYSTEMS:    CONSTITUTIONAL: No fever,   EYES: no acute visual disturbances  NECK: No pain or stiffness  RESPIRATORY: No cough; No shortness of breath  CARDIOVASCULAR: No chest pain, no palpitations  GASTROINTESTINAL: No pain. No nausea or vomiting; No diarrhea   NEUROLOGICAL: No headache or numbness, no tremors  MUSCULOSKELETAL: No joint pain, no muscle pain  GENITOURINARY: no dysuria, no frequency, no hesitancy  PSYCHIATRY: no depression , no anxiety  ALL OTHER  ROS negative        Vital Signs Last 24 Hrs  T(C): 36.8 (2019 11:47), Max: 37.6 (2019 20:43)  T(F): 98.2 (2019 11:47), Max: 99.7 (2019 20:43)  HR: 87 (2019 11:47) (80 - 92)  BP: 122/42 (2019 11:47) (118/60 - 145/59)  BP(mean): --  RR: 18 (2019 11:47) (16 - 20)  SpO2: 98% (2019 11:47) (98% - 100%)    ________________________________________________  PHYSICAL EXAM:  GENERAL: NAD  HEENT: Normocephalic;  conjunctivae and sclerae clear; moist mucous membranes;   NECK : supple  CHEST/LUNG: Clear to auscultation bilaterally with good air entry   HEART: S1 S2  regular; no murmurs, gallops or rubs  ABDOMEN: Soft, Nontender, Nondistended; Bowel sounds present  EXTREMITIES: no cyanosis; no edema; no calf tenderness  SKIN: warm and dry; no rash  NERVOUS SYSTEM:  Awake and alert; Oriented  to place, person and time ; no new deficits    _________________________________________________  LABS:                        9.0    7.00  )-----------( 95       ( 2019 06:34 )             25.8     -    139  |  107  |  15  ----------------------------<  154<H>  3.5   |  24  |  0.91    Ca    8.6      2019 06:17  Phos  2.8     -  Mg     2.2     -    TPro  8.3  /  Alb  3.3<L>  /  TBili  1.8<H>  /  DBili  0.5<H>  /  AST  21  /  ALT  20  /  AlkPhos  66  04-    PT/INR - ( 31 Mar 2019 18:39 )   PT: 15.1 sec;   INR: 1.35 ratio           Urinalysis Basic - ( 31 Mar 2019 19:51 )    Color: Yellow / Appearance: Clear / S.015 / pH: x  Gluc: x / Ketone: Trace  / Bili: Small / Urobili: 1   Blood: x / Protein: 100 / Nitrite: Negative   Leuk Esterase: Trace / RBC: 2-5 /HPF / WBC 3-5 /HPF   Sq Epi: x / Non Sq Epi: Moderate /HPF / Bacteria: Few /HPF      CAPILLARY BLOOD GLUCOSE            RADIOLOGY & ADDITIONAL TESTS:    Imaging Personally Reviewed:  YES    Consultant(s) Notes Reviewed:   YES    Care Discussed with Consultants : YES     Plan of care was discussed with patient and /or primary care giver; all questions and concerns were addressed and care was aligned with patient's wishes. PGY 1 Note discussed with supervising resident and primary attending    Patient is a 74y old  Female who presents with a chief complaint of shortness of breath (2019 10:04)      INTERVAL HPI/OVERNIGHT EVENTS: Patient was seen and examined, she denies any complaints, denies sob and feels well.     MEDICATIONS  (STANDING):  azithromycin  IVPB 500 milliGRAM(s) IV Intermittent every 24 hours  cefTRIAXone   IVPB 1 Gram(s) IV Intermittent every 24 hours  heparin  Injectable 5000 Unit(s) SubCutaneous every 8 hours  pantoprazole    Tablet 40 milliGRAM(s) Oral before breakfast    MEDICATIONS  (PRN):  acetaminophen   Tablet .. 650 milliGRAM(s) Oral every 6 hours PRN Temp greater or equal to 38C (100.4F), Mild Pain (1 - 3), Moderate Pain (4 - 6)      __________________________________________________  REVIEW OF SYSTEMS:    CONSTITUTIONAL: No fever,   EYES: no acute visual disturbances  NECK: No pain or stiffness  RESPIRATORY: No cough; No shortness of breath  CARDIOVASCULAR: No chest pain, no palpitations  GASTROINTESTINAL: No pain. No nausea or vomiting; No diarrhea   NEUROLOGICAL: No headache or numbness, no tremors  MUSCULOSKELETAL: No joint pain, no muscle pain  GENITOURINARY: no dysuria, no frequency, no hesitancy  PSYCHIATRY: no depression , no anxiety  ALL OTHER  ROS negative        Vital Signs Last 24 Hrs  T(C): 36.8 (2019 11:47), Max: 37.6 (2019 20:43)  T(F): 98.2 (2019 11:47), Max: 99.7 (2019 20:43)  HR: 87 (2019 11:47) (80 - 92)  BP: 122/42 (2019 11:47) (118/60 - 145/59)  BP(mean): --  RR: 18 (2019 11:47) (16 - 20)  SpO2: 98% (2019 11:47) (98% - 100%)    ________________________________________________  PHYSICAL EXAM:  GENERAL: NAD  HEENT: Normocephalic;  conjunctivae and sclerae clear; moist mucous membranes;   NECK : supple  CHEST/LUNG: Clear to auscultation bilaterally with good air entry   HEART: S1 S2  regular; no murmurs, gallops or rubs  ABDOMEN: Soft, Nontender, Nondistended; Bowel sounds present  EXTREMITIES: no cyanosis; no edema; no calf tenderness  SKIN: warm and dry; no rash  NERVOUS SYSTEM:  Awake and alert; Oriented  to place, person and time ; no new deficits    _________________________________________________  LABS:                        9.0    7.00  )-----------( 95       ( 2019 06:34 )             25.8     04-    139  |  107  |  15  ----------------------------<  154<H>  3.5   |  24  |  0.91    Ca    8.6      2019 06:17  Phos  2.8     04-  Mg     2.2     04-01    TPro  8.3  /  Alb  3.3<L>  /  TBili  1.8<H>  /  DBili  0.5<H>  /  AST  21  /  ALT  20  /  AlkPhos  66  04-01    PT/INR - ( 31 Mar 2019 18:39 )   PT: 15.1 sec;   INR: 1.35 ratio           Urinalysis Basic - ( 31 Mar 2019 19:51 )    Color: Yellow / Appearance: Clear / S.015 / pH: x  Gluc: x / Ketone: Trace  / Bili: Small / Urobili: 1   Blood: x / Protein: 100 / Nitrite: Negative   Leuk Esterase: Trace / RBC: 2-5 /HPF / WBC 3-5 /HPF   Sq Epi: x / Non Sq Epi: Moderate /HPF / Bacteria: Few /HPF      CAPILLARY BLOOD GLUCOSE            RADIOLOGY & ADDITIONAL TESTS:    Imaging Personally Reviewed:  YES    Consultant(s) Notes Reviewed:   YES    Care Discussed with Consultants : YES     Plan of care was discussed with patient and /or primary care giver; all questions and concerns were addressed and care was aligned with patient's wishes.

## 2019-04-02 NOTE — CONSULT NOTE ADULT - SUBJECTIVE AND OBJECTIVE BOX
CHIEF COMPLAINT: dyspnea    HPI:  75 y/o F from home lives with son, ambulates independently with PMHx of Htn, Rheumatic heart disease, external hemorrhoids, fibroids (s/p hysterectomy and chronic back pain presents to ED c/o shortness of breath x1 week, worsening today. Patient states she developed sudden dyspnea on exertion and was unable to walk 2 blocks. Relieved by rest and lying down.  Her associated symptoms are increased somnolence, anorexia, dizziness and fatigue. Pt also reports having chills, runny nose and back pain today. Patient denies blood in stool, melena, hematuria, hematemesis, dysuria or any other complains. Patient never had endoscopy or colonoscopy. Patient takes 2 tablets of advil daily for 1 month for back pain In ED, patient's vital signs were remarkable  for Temp of 101F, Labs were remarkable for H/H 7.0/20, CXR shows B/L pulmonary congestion. EKG NSR No ST-T wave inversion. Occult negative     Patient seen and examined at 5S in NAD - reviewd EKG advised primary team to consider TTP in their sifferential for anemia in a A    PAST MEDICAL & SURGICAL HISTORY:  Fibroids  H/O rheumatic heart disease  Essential hypertension  H/O abdominal hysterectomy      Allergies    No Known Allergies    Intolerances        MEDICATIONS  (STANDING):  azithromycin  IVPB 500 milliGRAM(s) IV Intermittent every 24 hours  cefTRIAXone   IVPB 1 Gram(s) IV Intermittent every 24 hours  heparin  Injectable 5000 Unit(s) SubCutaneous every 8 hours  pantoprazole    Tablet 40 milliGRAM(s) Oral before breakfast    MEDICATIONS  (PRN):  acetaminophen   Tablet .. 650 milliGRAM(s) Oral every 6 hours PRN Temp greater or equal to 38C (100.4F), Mild Pain (1 - 3), Moderate Pain (4 - 6)      FAMILY HISTORY:  FH: breast cancer: sISTER    No family history of premature coronary artery disease or sudden cardiac death    SOCIAL HISTORY:  Smoking-  Alcohol-  Illicit Drug use-    REVIEW OF SYSTEMS:  Constitutional: [ ] fever, [ ]weight loss,  [ ]fatigue  Eyes: [ ] visual changes  Respiratory: [ ]shortness of breath;  [ ] cough, [ ]wheezing, [ ]chills, [ ]hemoptysis  Cardiovascular: [ ] chest pain, [ ]palpitations, [ ]dizziness,  [ ]leg swelling [ ]syncope  Gastrointestinal: [ ] abdominal pain, [ ]nausea, [ ]vomiting,  [ ]diarrhea   Genitourinary: [ ] dysuria, [ ] hematuria  Neurologic: [ ] headaches [ ] tremors  [ ] weakness [ ] lightheadedness  Skin: [ ] itching, [ ]burning, [ ] rashes  Endocrine: [ ] heat or cold intolerance  Musculoskeletal: [ ] joint pain or swelling; [ ] muscle, back, or extremity pain  Psychiatric: [ ] depression, [ ]anxiety, [ ]mood swings, or [ ]difficulty sleeping  Hematologic: [ ] easy bruising, [ ] bleeding gums       [ x] All others negative	  [ ] Unable to obtain    Vital Signs Last 24 Hrs  T(C): 36.7 (02 Apr 2019 07:32), Max: 37.6 (01 Apr 2019 20:43)  T(F): 98.1 (02 Apr 2019 07:32), Max: 99.7 (01 Apr 2019 20:43)  HR: 86 (02 Apr 2019 07:32) (80 - 96)  BP: 118/60 (02 Apr 2019 07:32) (114/69 - 158/70)  BP(mean): --  RR: 16 (02 Apr 2019 07:32) (16 - 20)  SpO2: 100% (02 Apr 2019 07:32) (98% - 100%)  I&O's Summary      PHYSICAL EXAM:  General: No acute distress  HEENT: EOMI, PERRL  Neck: Supple, No JVD  Lungs: Clear to auscultation bilaterally; No rales or wheezing  Heart: Regular rate and rhythm; No murmurs, rubs, or gallops  Abdomen: Nontender, bowel sounds present  Extremities: No clubbing, cyanosis, or edema  Nervous system:  Alert & Oriented X3, no focal deficits  Psychiatric: Normal affect  Skin: No rashes or lesions      LABS:  04-01    139  |  107  |  15  ----------------------------<  154<H>  3.5   |  24  |  0.91    Ca    8.6      01 Apr 2019 06:17  Phos  2.8     04-01  Mg     2.2     04-01    TPro  8.3  /  Alb  3.3<L>  /  TBili  1.8<H>  /  DBili  0.5<H>  /  AST  21  /  ALT  20  /  AlkPhos  66  04-01    Creatinine Trend: 0.91<--, 0.89<--                        9.0    7.00  )-----------( 95       ( 02 Apr 2019 06:34 )             25.8     PT/INR - ( 31 Mar 2019 18:39 )   PT: 15.1 sec;   INR: 1.35 ratio             Lipid Panel:   Cardiac Enzymes: CARDIAC MARKERS ( 31 Mar 2019 18:39 )  <0.015 ng/mL / x     / x     / x     / x          Serum Pro-Brain Natriuretic Peptide: 21 pg/mL (03-31-19 @ 23:54)    04-01 [id number] 4.8      RADIOLOGY:    ECG [my interpretation]:    TELEMETRY:    ECHO:    STRESS TEST:    CATHETERIZATION: CHIEF COMPLAINT: dyspnea    HPI:  73 y/o F from home lives with son, ambulates independently with PMHx of Htn, Rheumatic heart disease, external hemorrhoids, fibroids (s/p hysterectomy and chronic back pain presents to ED c/o shortness of breath x1 week, worsening today. Patient states she developed sudden dyspnea on exertion and was unable to walk 2 blocks. Relieved by rest and lying down.  Her associated symptoms are increased somnolence, anorexia, dizziness and fatigue. Pt also reports having chills, runny nose and back pain today. Patient denies blood in stool, melena, hematuria, hematemesis, dysuria or any other complains. Patient never had endoscopy or colonoscopy. Patient takes 2 tablets of advil daily for 1 month for back pain In ED, patient's vital signs were remarkable  for Temp of 101F, Labs were remarkable for H/H 7.0/20, CXR shows B/L pulmonary congestion. EKG NSR No ST-T wave inversion. Occult negative.    Patient seen and examined at 5S in NAD - reviewd EKG and telemetry findins; advised primary team to consider r/o TTP in their differential for anemia    PAST MEDICAL & SURGICAL HISTORY:  Fibroids  H/O rheumatic heart disease  Essential hypertension  H/O abdominal hysterectomy      Allergies    No Known Allergies    Intolerances        MEDICATIONS  (STANDING):  azithromycin  IVPB 500 milliGRAM(s) IV Intermittent every 24 hours  cefTRIAXone   IVPB 1 Gram(s) IV Intermittent every 24 hours  heparin  Injectable 5000 Unit(s) SubCutaneous every 8 hours  pantoprazole    Tablet 40 milliGRAM(s) Oral before breakfast    MEDICATIONS  (PRN):  acetaminophen   Tablet .. 650 milliGRAM(s) Oral every 6 hours PRN Temp greater or equal to 38C (100.4F), Mild Pain (1 - 3), Moderate Pain (4 - 6)      FAMILY HISTORY:  FH: breast cancer: Sister    No family history of premature coronary artery disease or sudden cardiac death    SOCIAL HISTORY: Denied Hx of tobacco smoking, alcohol, or illicit drug use    REVIEW OF SYSTEMS:  Constitutional: [ ] fever, [ ]weight loss,  [ ]fatigue  Eyes: [ ] visual changes  Respiratory: [ ]shortness of breath;  [ ] cough, [ ]wheezing, [ ]chills, [ ]hemoptysis  Cardiovascular: [ ] chest pain, [x]palpitations, [ ]dizziness,  [ ]leg swelling [ ]syncope  Gastrointestinal: [ ] abdominal pain, [ ]nausea, [ ]vomiting,  [ ]diarrhea   Genitourinary: [ ] dysuria, [ ] hematuria  Neurologic: [ ] headaches [ ] tremors  [ ] weakness [ ] lightheadedness  Skin: [ ] itching, [ ]burning, [ ] rashes  Endocrine: [ ] heat or cold intolerance  Musculoskeletal: [ ] joint pain or swelling; [ ] muscle, back, or extremity pain  Psychiatric: [ ] depression, [ ]anxiety, [ ]mood swings, or [ ]difficulty sleeping  Hematologic: [ ] easy bruising, [ ] bleeding gums       [ x] All others negative	  [ ] Unable to obtain    Vital Signs Last 24 Hrs  T(C): 36.7 (02 Apr 2019 07:32), Max: 37.6 (01 Apr 2019 20:43)  T(F): 98.1 (02 Apr 2019 07:32), Max: 99.7 (01 Apr 2019 20:43)  HR: 86 (02 Apr 2019 07:32) (80 - 96)  BP: 118/60 (02 Apr 2019 07:32) (114/69 - 158/70)  BP(mean): --  RR: 16 (02 Apr 2019 07:32) (16 - 20)  SpO2: 100% (02 Apr 2019 07:32) (98% - 100%)  I&O's Summary      PHYSICAL EXAM:  General: No acute distress  HEENT: EOMI, PERRL  Neck: Supple, No JVD  Lungs: Clear to auscultation bilaterally; No rales or wheezing  Heart: Regular rate and rhythm; No murmurs, rubs, or gallops  Abdomen: Nontender, bowel sounds present  Extremities: No clubbing, cyanosis, or edema  Nervous system:  Alert & Oriented X3, no focal deficits  Psychiatric: Normal affect  Skin: No rashes or lesions      LABS:  04-01    139  |  107  |  15  ----------------------------<  154<H>  3.5   |  24  |  0.91    Ca    8.6      01 Apr 2019 06:17  Phos  2.8     04-01  Mg     2.2     04-01    TPro  8.3  /  Alb  3.3<L>  /  TBili  1.8<H>  /  DBili  0.5<H>  /  AST  21  /  ALT  20  /  AlkPhos  66  04-01    Creatinine Trend: 0.91<--, 0.89<--                        9.0    7.00  )-----------( 95       ( 02 Apr 2019 06:34 )             25.8     PT/INR - ( 31 Mar 2019 18:39 )   PT: 15.1 sec;   INR: 1.35 ratio             Lipid Panel:   Cardiac Enzymes: CARDIAC MARKERS ( 31 Mar 2019 18:39 )  <0.015 ng/mL / x     / x     / x     / x          Serum Pro-Brain Natriuretic Peptide: 21 pg/mL (03-31-19 @ 23:54)    04-01 [id number] 4.8      RADIOLOGY: < from: Xray Chest 1 View-PORTABLE IMMEDIATE (03.31.19 @ 19:08) >  Mild increased interstitial lung markings. Atelectasis lung bases.  Nonspecific right paratracheal density noted. Follow-up recommended.  No pleural effusion.  Heart size cannot be accurately assessed in this projection, but appear   enlarged.    ECG [my interpretation]: NSR w/ normal axis, 93 BPM    TELEMETRY: Atrial tachycardia w/ 2 different P wave morphologies     ECHO: < from: Transthoracic Echocardiogram (04.01.19 @ 07:16) >  1. Mitral annular calcification. Mild mitral regurgitation.  2. Calcified trileaflet aortic valve withnormal opening.  3. Aortic Root: 3.0 cm.  4. Severely dilated left atrium.  LA volume index = 51  cc/m2.  5. Mild concentric left ventricular hypertrophy.  6. Normal Left Ventricular Systolic Function,  (EF = 55 to  60%)  7. Grade II diastolic dysfunction.  8. Normal right atrium.  9. Normal right ventricular size and systolic function  (TAPSE 2.5 cm).  10. RA Pressure is 8 mm Hg.  11. RV systolic pressure is mildly increased at  49 mm Hg.  12. There is mild tricuspid regurgitation.  13. There ismild pulmonic regurgitation.  14. Normal pericardium with no pericardial effusion.    STRESS TEST: None    CATHETERIZATION: None

## 2019-04-02 NOTE — CONSULT NOTE ADULT - PROBLEM SELECTOR RECOMMENDATION 9
bilirubin is elevated and LDH is elevated  will needs to r/o hemolysis  will check iron, B12, folate, ret, BEBE  transfuse prbc because she is symptomatic
Likely 2/2 atrial tachycardia as per telemetry findings  - Rhythm was short in duration and resolved spontaneously; suggest initial therapy with oral BB or nondihydropyridine CCBs  - No further need for telemetry

## 2019-04-03 DIAGNOSIS — R07.81 PLEURODYNIA: ICD-10-CM

## 2019-04-03 DIAGNOSIS — J40 BRONCHITIS, NOT SPECIFIED AS ACUTE OR CHRONIC: ICD-10-CM

## 2019-04-03 LAB
ALBUMIN SERPL ELPH-MCNC: 3.1 G/DL — LOW (ref 3.5–5)
ALP SERPL-CCNC: 62 U/L — SIGNIFICANT CHANGE UP (ref 40–120)
ALT FLD-CCNC: 21 U/L DA — SIGNIFICANT CHANGE UP (ref 10–60)
ANION GAP SERPL CALC-SCNC: 3 MMOL/L — LOW (ref 5–17)
AST SERPL-CCNC: 21 U/L — SIGNIFICANT CHANGE UP (ref 10–40)
BASOPHILS # BLD AUTO: 0.01 K/UL — SIGNIFICANT CHANGE UP (ref 0–0.2)
BASOPHILS NFR BLD AUTO: 0.2 % — SIGNIFICANT CHANGE UP (ref 0–2)
BILIRUB SERPL-MCNC: 1.2 MG/DL — SIGNIFICANT CHANGE UP (ref 0.2–1.2)
BUN SERPL-MCNC: 11 MG/DL — SIGNIFICANT CHANGE UP (ref 7–18)
CALCIUM SERPL-MCNC: 8.7 MG/DL — SIGNIFICANT CHANGE UP (ref 8.4–10.5)
CHLORIDE SERPL-SCNC: 106 MMOL/L — SIGNIFICANT CHANGE UP (ref 96–108)
CO2 SERPL-SCNC: 28 MMOL/L — SIGNIFICANT CHANGE UP (ref 22–31)
CREAT SERPL-MCNC: 0.8 MG/DL — SIGNIFICANT CHANGE UP (ref 0.5–1.3)
EOSINOPHIL # BLD AUTO: 0.01 K/UL — SIGNIFICANT CHANGE UP (ref 0–0.5)
EOSINOPHIL NFR BLD AUTO: 0.2 % — SIGNIFICANT CHANGE UP (ref 0–6)
GLUCOSE SERPL-MCNC: 100 MG/DL — HIGH (ref 70–99)
HAV IGM SER-ACNC: SIGNIFICANT CHANGE UP
HBV CORE IGM SER-ACNC: SIGNIFICANT CHANGE UP
HBV SURFACE AG SER-ACNC: SIGNIFICANT CHANGE UP
HCT VFR BLD CALC: 23.8 % — LOW (ref 34.5–45)
HCV AB S/CO SERPL IA: 3.02 S/CO — HIGH (ref 0–0.79)
HCV AB SERPL-IMP: ABNORMAL
HCV RNA FLD QL NAA+PROBE: SIGNIFICANT CHANGE UP
HCV RNA SPEC QL PROBE+SIG AMP: SIGNIFICANT CHANGE UP
HGB BLD-MCNC: 8.1 G/DL — LOW (ref 11.5–15.5)
IMM GRANULOCYTES NFR BLD AUTO: 1.3 % — SIGNIFICANT CHANGE UP (ref 0–1.5)
LYMPHOCYTES # BLD AUTO: 2.16 K/UL — SIGNIFICANT CHANGE UP (ref 1–3.3)
LYMPHOCYTES # BLD AUTO: 40 % — SIGNIFICANT CHANGE UP (ref 13–44)
MAGNESIUM SERPL-MCNC: 2.3 MG/DL — SIGNIFICANT CHANGE UP (ref 1.6–2.6)
MCHC RBC-ENTMCNC: 29.6 PG — SIGNIFICANT CHANGE UP (ref 27–34)
MCHC RBC-ENTMCNC: 34 GM/DL — SIGNIFICANT CHANGE UP (ref 32–36)
MCV RBC AUTO: 86.9 FL — SIGNIFICANT CHANGE UP (ref 80–100)
MONOCYTES # BLD AUTO: 1.36 K/UL — HIGH (ref 0–0.9)
MONOCYTES NFR BLD AUTO: 25.2 % — HIGH (ref 2–14)
NEUTROPHILS # BLD AUTO: 1.79 K/UL — LOW (ref 1.8–7.4)
NEUTROPHILS NFR BLD AUTO: 33.1 % — LOW (ref 43–77)
NRBC # BLD: 2 /100 WBCS — HIGH (ref 0–0)
PHOSPHATE SERPL-MCNC: 3.9 MG/DL — SIGNIFICANT CHANGE UP (ref 2.5–4.5)
PLATELET # BLD AUTO: 84 K/UL — LOW (ref 150–400)
POTASSIUM SERPL-MCNC: 4.1 MMOL/L — SIGNIFICANT CHANGE UP (ref 3.5–5.3)
POTASSIUM SERPL-SCNC: 4.1 MMOL/L — SIGNIFICANT CHANGE UP (ref 3.5–5.3)
PROT SERPL-MCNC: 8.2 G/DL — SIGNIFICANT CHANGE UP (ref 6–8.3)
RBC # BLD: 2.74 M/UL — LOW (ref 3.8–5.2)
RBC # FLD: 19 % — HIGH (ref 10.3–14.5)
RHEUMATOID FACT SERPL-ACNC: 12 IU/ML — SIGNIFICANT CHANGE UP (ref 0–13)
SODIUM SERPL-SCNC: 137 MMOL/L — SIGNIFICANT CHANGE UP (ref 135–145)
WBC # BLD: 5.4 K/UL — SIGNIFICANT CHANGE UP (ref 3.8–10.5)
WBC # FLD AUTO: 5.4 K/UL — SIGNIFICANT CHANGE UP (ref 3.8–10.5)

## 2019-04-03 PROCEDURE — 99233 SBSQ HOSP IP/OBS HIGH 50: CPT | Mod: GC

## 2019-04-03 PROCEDURE — 71250 CT THORAX DX C-: CPT | Mod: 26

## 2019-04-03 RX ORDER — METOPROLOL TARTRATE 50 MG
25 TABLET ORAL DAILY
Qty: 0 | Refills: 0 | Status: DISCONTINUED | OUTPATIENT
Start: 2019-04-03 | End: 2019-04-05

## 2019-04-03 RX ORDER — SOD SULF/SODIUM/NAHCO3/KCL/PEG
4000 SOLUTION, RECONSTITUTED, ORAL ORAL ONCE
Qty: 0 | Refills: 0 | Status: COMPLETED | OUTPATIENT
Start: 2019-04-03 | End: 2019-04-03

## 2019-04-03 RX ADMIN — Medication 650 MILLIGRAM(S): at 19:54

## 2019-04-03 RX ADMIN — Medication 100 MILLIGRAM(S): at 17:34

## 2019-04-03 RX ADMIN — HEPARIN SODIUM 5000 UNIT(S): 5000 INJECTION INTRAVENOUS; SUBCUTANEOUS at 21:28

## 2019-04-03 RX ADMIN — PANTOPRAZOLE SODIUM 40 MILLIGRAM(S): 20 TABLET, DELAYED RELEASE ORAL at 06:28

## 2019-04-03 RX ADMIN — Medication 100 MILLIGRAM(S): at 06:28

## 2019-04-03 RX ADMIN — HEPARIN SODIUM 5000 UNIT(S): 5000 INJECTION INTRAVENOUS; SUBCUTANEOUS at 14:26

## 2019-04-03 RX ADMIN — Medication 4000 MILLILITER(S): at 17:34

## 2019-04-03 RX ADMIN — Medication 650 MILLIGRAM(S): at 21:27

## 2019-04-03 RX ADMIN — AZITHROMYCIN 250 MILLIGRAM(S): 500 TABLET, FILM COATED ORAL at 11:34

## 2019-04-03 RX ADMIN — HEPARIN SODIUM 5000 UNIT(S): 5000 INJECTION INTRAVENOUS; SUBCUTANEOUS at 06:28

## 2019-04-03 RX ADMIN — SENNA PLUS 2 TABLET(S): 8.6 TABLET ORAL at 21:27

## 2019-04-03 NOTE — PROGRESS NOTE ADULT - PROBLEM SELECTOR PLAN 1
-Patient presented with sob, Hb on admission 7. s/p 2 PRBC  -Hb dropped to 8.2 this morning.   Stool occult negative  -Normocytic, normochromic anemia, no hemolysis- haptoglobin normal.  -Anemia panel- anemia of chronic disease.   -SPEP- normal no monoclonal bands identified.   -CT abdomen with oral contrast- limited study, hiatal hernia, distal esophageal thickening.   -Given elevated ESR, will follow autoimmune panel.   -Will also check for smear and immune flow cytometry to r/o any haematologic malignancy.  -Heme onc- Dr. Moreno  -GI Dr. Amaya

## 2019-04-03 NOTE — PROGRESS NOTE ADULT - SUBJECTIVE AND OBJECTIVE BOX
PGY 1 Note discussed with supervising resident and primary attending    Patient is a 74y old  Female who presents with a chief complaint of Shortness of breath (03 Apr 2019 13:41)      INTERVAL HPI/OVERNIGHT EVENTS: Patient was seen and examined, she was short of breath this morning, which improved later during the day without any interventions.     MEDICATIONS  (STANDING):  azithromycin  IVPB 500 milliGRAM(s) IV Intermittent every 24 hours  cefTRIAXone   IVPB 1 Gram(s) IV Intermittent every 24 hours  docusate sodium 100 milliGRAM(s) Oral two times a day  heparin  Injectable 5000 Unit(s) SubCutaneous every 8 hours  metoprolol succinate ER 25 milliGRAM(s) Oral daily  pantoprazole    Tablet 40 milliGRAM(s) Oral before breakfast  senna 2 Tablet(s) Oral at bedtime    MEDICATIONS  (PRN):  acetaminophen   Tablet .. 650 milliGRAM(s) Oral every 6 hours PRN Temp greater or equal to 38C (100.4F), Mild Pain (1 - 3), Moderate Pain (4 - 6)      __________________________________________________  REVIEW OF SYSTEMS:    CONSTITUTIONAL: No fever,   EYES: no acute visual disturbances  NECK: No pain or stiffness  RESPIRATORY: No cough; No shortness of breath  CARDIOVASCULAR: No chest pain, no palpitations  GASTROINTESTINAL: No pain. No nausea or vomiting; No diarrhea   NEUROLOGICAL: No headache or numbness, no tremors  MUSCULOSKELETAL: No joint pain, no muscle pain  GENITOURINARY: no dysuria, no frequency, no hesitancy  PSYCHIATRY: no depression , no anxiety  ALL OTHER  ROS negative        Vital Signs Last 24 Hrs  T(C): 37.1 (03 Apr 2019 15:53), Max: 37.3 (02 Apr 2019 19:14)  T(F): 98.7 (03 Apr 2019 15:53), Max: 99.2 (02 Apr 2019 19:14)  HR: 82 (03 Apr 2019 15:53) (73 - 90)  BP: 131/62 (03 Apr 2019 15:53) (121/53 - 143/70)  BP(mean): --  RR: 17 (03 Apr 2019 15:53) (17 - 17)  SpO2: 100% (03 Apr 2019 15:53) (100% - 100%)    ________________________________________________  PHYSICAL EXAM:  GENERAL: NAD  HEENT: Normocephalic;  conjunctivae and sclerae clear; moist mucous membranes;   NECK : supple  CHEST/LUNG: Clear to auscultation bilaterally with good air entry   HEART: S1 S2  regular; no murmurs, gallops or rubs  ABDOMEN: Soft, Nontender, Nondistended; Bowel sounds present  EXTREMITIES: no cyanosis; no edema; no calf tenderness  SKIN: warm and dry; no rash  NERVOUS SYSTEM:  Awake and alert; Oriented  to place, person and time ; no new deficits    _________________________________________________  LABS:                        8.1    5.40  )-----------( 84       ( 03 Apr 2019 07:05 )             23.8     04-03    137  |  106  |  11  ----------------------------<  100<H>  4.1   |  28  |  0.80    Ca    8.7      03 Apr 2019 07:05  Phos  3.9     04-03  Mg     2.3     04-03    TPro  8.2  /  Alb  3.1<L>  /  TBili  1.2  /  DBili  x   /  AST  21  /  ALT  21  /  AlkPhos  62  04-03        CAPILLARY BLOOD GLUCOSE            RADIOLOGY & ADDITIONAL TESTS:    Imaging Personally Reviewed:  YES    Consultant(s) Notes Reviewed:   YES    Care Discussed with Consultants : YES     Plan of care was discussed with patient and /or primary care giver; all questions and concerns were addressed and care was aligned with patient's wishes.

## 2019-04-03 NOTE — PROGRESS NOTE ADULT - SUBJECTIVE AND OBJECTIVE BOX
feel better  no more fever or chills  breathe better  no cough  pain at right rib continues with tenderness    ROS:  Negative except for:    MEDICATIONS  (STANDING):  azithromycin  IVPB 500 milliGRAM(s) IV Intermittent every 24 hours  cefTRIAXone   IVPB 1 Gram(s) IV Intermittent every 24 hours  docusate sodium 100 milliGRAM(s) Oral two times a day  heparin  Injectable 5000 Unit(s) SubCutaneous every 8 hours  metoprolol succinate ER 25 milliGRAM(s) Oral daily  pantoprazole    Tablet 40 milliGRAM(s) Oral before breakfast  senna 2 Tablet(s) Oral at bedtime    MEDICATIONS  (PRN):  acetaminophen   Tablet .. 650 milliGRAM(s) Oral every 6 hours PRN Temp greater or equal to 38C (100.4F), Mild Pain (1 - 3), Moderate Pain (4 - 6)      Allergies    No Known Allergies    Intolerances        Vital Signs Last 24 Hrs  T(C): 36.5 (03 Apr 2019 12:19), Max: 37.3 (02 Apr 2019 19:14)  T(F): 97.7 (03 Apr 2019 12:19), Max: 99.2 (02 Apr 2019 19:14)  HR: 90 (03 Apr 2019 12:19) (73 - 90)  BP: 125/58 (03 Apr 2019 12:19) (121/53 - 143/70)  BP(mean): --  RR: 17 (03 Apr 2019 12:19) (17 - 19)  SpO2: 100% (03 Apr 2019 12:19) (100% - 100%)    PHYSICAL EXAM  General: adult in NAD  HEENT: clear oropharynx, anicteric sclera, pink conjunctiva  Neck: supple  CV: normal S1/S2 with no murmur rubs or gallops  Lungs: positive air movement b/l ant lungs,clear to auscultation, no wheezes, no rales  Abdomen: soft non-tender non-distended, no hepatosplenomegaly  Ext: no clubbing cyanosis or edema  Skin: no rashes and no petechiae  Neuro: alert and oriented X 4, no focal deficits  LABS:                          8.1    5.40  )-----------( 84       ( 03 Apr 2019 07:05 )             23.8         Mean Cell Volume : 86.9 fl  Mean Cell Hemoglobin : 29.6 pg  Mean Cell Hemoglobin Concentration : 34.0 gm/dL  Auto Neutrophil # : 1.79 K/uL  Auto Lymphocyte # : 2.16 K/uL  Auto Monocyte # : 1.36 K/uL  Auto Eosinophil # : 0.01 K/uL  Auto Basophil # : 0.01 K/uL  Auto Neutrophil % : 33.1 %  Auto Lymphocyte % : 40.0 %  Auto Monocyte % : 25.2 %  Auto Eosinophil % : 0.2 %  Auto Basophil % : 0.2 %    Serial CBC  Hematocrit 23.8  Hemoglobin 8.1  Plat 84  RBC 2.74  WBC 5.40  Serial CBC  Hematocrit 25.8  Hemoglobin 9.0  Plat 95  RBC 3.00  WBC 7.00  Serial CBC  Hematocrit 23.1  Hemoglobin 8.1  Plat 83  RBC 2.69  WBC 7.96  Serial CBC  Hematocrit --  Hemoglobin --  Plat --  RBC 2.77  WBC --  Serial CBC  Hematocrit 21.4  Hemoglobin 7.5  Plat 90  RBC 2.42  WBC 9.26  Serial CBC  Hematocrit 20.0  Hemoglobin 7.0  Plat 103  RBC 2.23  WBC 9.14    04-03    137  |  106  |  11  ----------------------------<  100<H>  4.1   |  28  |  0.80    Ca    8.7      03 Apr 2019 07:05  Phos  3.9     04-03  Mg     2.3     04-03    TPro  8.2  /  Alb  3.1<L>  /  TBili  1.2  /  DBili  x   /  AST  21  /  ALT  21  /  AlkPhos  62  04-03          Reticulocyte Percent: 1.2 % (04-02 @ 06:34)  Reticulocyte Percent: 1.4 % (04-01 @ 12:29)  Ferritin, Serum: 635 ng/mL (04-01 @ 11:46)  Folate, Serum: >20.0 ng/mL (04-01 @ 11:34)  Vitamin B12, Serum: 845 pg/mL (04-01 @ 11:34)  Iron - Total Binding Capacity.: 209 ug/dL (03-31 @ 21:24)      Serum Protein Electrophoresis Interp: Polyclonal Gammopathy (04-02 @ 10:09)  Immunofixation, Serum:   No Monoclonal Band Identified    Reference Range: None Detected (04-02 @ 10:09)  BEBE Kappa: 8.50 mg/dL (04-02 @ 10:09)  BEBE Lambda: 4.99 mg/dL (04-02 @ 10:09)        BLOOD SMEAR INTERPRETATION:       RADIOLOGY & ADDITIONAL STUDIES: feel better  no more fever or chills  breathe better  no cough  pain at right rib continues with tenderness    ROS:  Negative except for:    MEDICATIONS  (STANDING):  azithromycin  IVPB 500 milliGRAM(s) IV Intermittent every 24 hours  cefTRIAXone   IVPB 1 Gram(s) IV Intermittent every 24 hours  docusate sodium 100 milliGRAM(s) Oral two times a day  heparin  Injectable 5000 Unit(s) SubCutaneous every 8 hours  metoprolol succinate ER 25 milliGRAM(s) Oral daily  pantoprazole    Tablet 40 milliGRAM(s) Oral before breakfast  senna 2 Tablet(s) Oral at bedtime    MEDICATIONS  (PRN):  acetaminophen   Tablet .. 650 milliGRAM(s) Oral every 6 hours PRN Temp greater or equal to 38C (100.4F), Mild Pain (1 - 3), Moderate Pain (4 - 6)      Allergies    No Known Allergies    Intolerances        Vital Signs Last 24 Hrs  T(C): 36.5 (03 Apr 2019 12:19), Max: 37.3 (02 Apr 2019 19:14)  T(F): 97.7 (03 Apr 2019 12:19), Max: 99.2 (02 Apr 2019 19:14)  HR: 90 (03 Apr 2019 12:19) (73 - 90)  BP: 125/58 (03 Apr 2019 12:19) (121/53 - 143/70)  BP(mean): --  RR: 17 (03 Apr 2019 12:19) (17 - 19)  SpO2: 100% (03 Apr 2019 12:19) (100% - 100%)    PHYSICAL EXAM  General: adult in NAD  HEENT: clear oropharynx, anicteric sclera, pink conjunctiva  Neck: supple  CV: normal S1/S2 with no murmur rubs or gallops  Lungs: positive air movement b/l ant lungs,clear to auscultation, no wheezes, no rales  Abdomen: soft non-tender non-distended, no hepatosplenomegaly  Ext: no clubbing cyanosis or edema  Skin: no rashes and no petechiae  Neuro: alert and oriented X 4, no focal deficits  LABS:                          8.1    5.40  )-----------( 84       ( 03 Apr 2019 07:05 )             23.8         Mean Cell Volume : 86.9 fl  Mean Cell Hemoglobin : 29.6 pg  Mean Cell Hemoglobin Concentration : 34.0 gm/dL  Auto Neutrophil # : 1.79 K/uL  Auto Lymphocyte # : 2.16 K/uL  Auto Monocyte # : 1.36 K/uL  Auto Eosinophil # : 0.01 K/uL  Auto Basophil # : 0.01 K/uL  Auto Neutrophil % : 33.1 %  Auto Lymphocyte % : 40.0 %  Auto Monocyte % : 25.2 %  Auto Eosinophil % : 0.2 %  Auto Basophil % : 0.2 %    Serial CBC  Hematocrit 23.8  Hemoglobin 8.1  Plat 84  RBC 2.74  WBC 5.40  Serial CBC  Hematocrit 25.8  Hemoglobin 9.0  Plat 95  RBC 3.00  WBC 7.00  Serial CBC  Hematocrit 23.1  Hemoglobin 8.1  Plat 83  RBC 2.69  WBC 7.96  Serial CBC  Hematocrit --  Hemoglobin --  Plat --  RBC 2.77  WBC --  Serial CBC  Hematocrit 21.4  Hemoglobin 7.5  Plat 90  RBC 2.42  WBC 9.26  Serial CBC  Hematocrit 20.0  Hemoglobin 7.0  Plat 103  RBC 2.23  WBC 9.14    04-03    137  |  106  |  11  ----------------------------<  100<H>  4.1   |  28  |  0.80    Ca    8.7      03 Apr 2019 07:05  Phos  3.9     04-03  Mg     2.3     04-03    TPro  8.2  /  Alb  3.1<L>  /  TBili  1.2  /  DBili  x   /  AST  21  /  ALT  21  /  AlkPhos  62  04-03          Reticulocyte Percent: 1.2 % (04-02 @ 06:34)  Reticulocyte Percent: 1.4 % (04-01 @ 12:29)  Ferritin, Serum: 635 ng/mL (04-01 @ 11:46)  Folate, Serum: >20.0 ng/mL (04-01 @ 11:34)  Vitamin B12, Serum: 845 pg/mL (04-01 @ 11:34)  Iron - Total Binding Capacity.: 209 ug/dL (03-31 @ 21:24)      Serum Protein Electrophoresis Interp: Polyclonal Gammopathy (04-02 @ 10:09)  Immunofixation, Serum:   No Monoclonal Band Identified    Reference Range: None Detected (04-02 @ 10:09)  BEBE Kappa: 8.50 mg/dL (04-02 @ 10:09)  BEBE Lambda: 4.99 mg/dL (04-02 @ 10:09)        BLOOD SMEAR INTERPRETATION:       RADIOLOGY & ADDITIONAL STUDIES:< from: CT Abdomen and Pelvis w/ Oral Cont (04.02.19 @ 13:31) >  INTERPRETATION:  CLINICAL HISTORY: 74 years  Female with severe anemia.    COMPARISON: None    PROCEDURE:   CT of the Abdomen and Pelvis was performed without intravenous contrast.   Intravenous contrast: None.  Oral contrast: None.  Sagittal and coronal reformats were performed.      Evaluation of the solid organs is limited by lack of IV contrast   administration.    Survey scans of the lung bases demonstrates mild bibasilar discoid   atelectasis.. There is a small sliding hiatus hernia with thickening of   the distal esophagus. The heart is mildly enlarged.    The unenhanced spleen and pancreas are unremarkable. The liver is   enlarged measuring 17.2 cm sagittal.    There is sludge in the gallbladder.    The adrenal glands are mildly thickened.    The kidneys demonstrate no hydronephrosis or calculi.    There is no retroperitoneal adenopathy or ascites. The aorta is   atherosclerotic but normal in caliber.    The bowel demonstrates no obstruction or surrounding inflammation. There   is diffuse gastric wall thickening secondary to underdistention limiting   evaluation for underlying mass. There is mild rectal fecal impaction with   rectum distended to 7.8 cm in caliber.  The appendix is not visualized.   There is no indirect evidence of appendicitis..    There is a small fat-containing umbilical hernia.    < end of copied text >  < from: CT Abdomen and Pelvis w/ Oral Cont (04.02.19 @ 13:31) >  Within the pelvis, the urinary bladder is unremarkable. The patient   status post hysterectomy. There is no pelvic adenopathy.    There are degenerative changes of the lower thoracic spine and the   bilateral sacroiliac joints.    IMPRESSION:    Evaluation of the solid organs is limited without IV contrast.    Small sliding hiatus hernia with thickened distal esophagus. Diffuse   gastric wall thickening secondary to underdistention limiting evaluation   for underlying mass. Recommend endoscopy.    Gallbladder sludge.    Mild cardiomegaly.    < end of copied text >

## 2019-04-03 NOTE — PROGRESS NOTE ADULT - PROBLEM SELECTOR PLAN 1
and thrombocytopenia according to her are new  ?infection related  she has monocytosis, reactive or CMML  will check smear and thrombocytopenia according to her are new  ?infection related  she has monocytosis, reactive or CMML  BEBE is neg  ?cause of high ESR  will check smear

## 2019-04-03 NOTE — PROGRESS NOTE ADULT - SUBJECTIVE AND OBJECTIVE BOX
PRESENTING CC: SOB    SUBJ:     In summary, this is 73 y/o F PMH HTN, Rheumatic Heart Disease p/w dyspnea - admitted for symptomatic anemia - cardiology consulted for evaluation of palpitations      Overnight, no acute events - telemetry reviewed, minimal atrial tachycardia noted      ------------------------  PMH: HTN, Rheumatic Heart Disease    Allergies    No Known Allergies    Intolerances        MEDICATIONS  (STANDING):  azithromycin  IVPB 500 milliGRAM(s) IV Intermittent every 24 hours  cefTRIAXone   IVPB 1 Gram(s) IV Intermittent every 24 hours  docusate sodium 100 milliGRAM(s) Oral two times a day  heparin  Injectable 5000 Unit(s) SubCutaneous every 8 hours  metoprolol succinate ER 25 milliGRAM(s) Oral daily  pantoprazole    Tablet 40 milliGRAM(s) Oral before breakfast  senna 2 Tablet(s) Oral at bedtime    MEDICATIONS  (PRN):  acetaminophen   Tablet .. 650 milliGRAM(s) Oral every 6 hours PRN Temp greater or equal to 38C (100.4F), Mild Pain (1 - 3), Moderate Pain (4 - 6)      FAMILY HISTORY:  FH: breast cancer: sISTER    Reviewed; no change from my prior note    SOCIAL HISTORY:  Reviewed, no change from my prior note    REVIEW OF SYSTEMS:  Constitutional: [ ] fever, [ ]weight loss,  [ ]fatigue  Eyes: [ ] visual changes  Respiratory: [x]shortness of breath;  [ ] cough, [ ]wheezing, [ ]chills, [ ]hemoptysis  Cardiovascular: [ ] chest pain, [ ]palpitations, [ ]dizziness,  [ ]leg swelling [ ]syncope  Gastrointestinal: [ ] abdominal pain, [ ]nausea, [ ]vomiting,  [ ]diarrhea   Genitourinary: [ ] dysuria, [ ] hematuria  Neurologic: [ ] headaches [ ] tremors [ ] weakness [ ] lightheadedness  Skin: [ ] itching, [ ]burning, [ ] rashes  Endocrine: [ ] heat or cold intolerance  Musculoskeletal: [ ] joint pain or swelling; [ ] muscle, back, or extremity pain  Psychiatric: [ ] depression, [ ]anxiety, [ ]mood swings, or [ ]difficulty sleeping  Hematologic: [ ] easy bruising, [ ] bleeding gums    [x] All remaining systems negative except as per above.   [  ] Unable to obtain    Vital Signs Last 24 Hrs  T(C): 37.2 (03 Apr 2019 07:30), Max: 37.3 (02 Apr 2019 19:14)  T(F): 99 (03 Apr 2019 07:30), Max: 99.2 (02 Apr 2019 19:14)  HR: 73 (03 Apr 2019 07:30) (73 - 82)  BP: 121/54 (03 Apr 2019 07:30) (121/53 - 143/70)  BP(mean): --  RR: 17 (03 Apr 2019 07:30) (17 - 19)  SpO2: 100% (03 Apr 2019 07:30) (100% - 100%)  I&O's Summary    02 Apr 2019 07:01  -  03 Apr 2019 07:00  --------------------------------------------------------  IN: 250 mL / OUT: 0 mL / NET: 250 mL    03 Apr 2019 07:01  -  03 Apr 2019 11:50  --------------------------------------------------------  IN: 368 mL / OUT: 0 mL / NET: 368 mL        PHYSICAL EXAM:  General: No acute distress  HEENT: EOMI, PERRL  Neck: Supple, No JVD  Lungs: Clear to auscultation bilaterally; No rales or wheezing  Heart: Regular rate and rhythm; No murmurs, rubs, or gallops  Abdomen: Nontender, bowel sounds present  Extremities: No clubbing, cyanosis, or edema  Nervous system:  Alert & Oriented X3, no focal deficits  Psychiatric: Normal affect  Skin: No rashes or lesions    LABS:  04-03    137  |  106  |  11  ----------------------------<  100<H>  4.1   |  28  |  0.80    Ca    8.7      03 Apr 2019 07:05  Phos  3.9     04-03  Mg     2.3     04-03    TPro  8.2  /  Alb  3.1<L>  /  TBili  1.2  /  DBili  x   /  AST  21  /  ALT  21  /  AlkPhos  62  04-03    Creatinine Trend: 0.80<--, 0.91<--, 0.89<--                        8.1    5.40  )-----------( 84       ( 03 Apr 2019 07:05 )             23.8

## 2019-04-04 LAB
ANION GAP SERPL CALC-SCNC: 5 MMOL/L — SIGNIFICANT CHANGE UP (ref 5–17)
ANTI-RIBONUCLEAR PROTEIN: 0.8 AI — SIGNIFICANT CHANGE UP
AUTO DIFF PNL BLD: ABNORMAL
BUN SERPL-MCNC: 11 MG/DL — SIGNIFICANT CHANGE UP (ref 7–18)
C-ANCA SER-ACNC: NEGATIVE — SIGNIFICANT CHANGE UP
C3 SERPL-MCNC: 161 MG/DL — HIGH (ref 81–157)
C4 SERPL-MCNC: 31 MG/DL — SIGNIFICANT CHANGE UP (ref 13–39)
CALCIUM SERPL-MCNC: 8.8 MG/DL — SIGNIFICANT CHANGE UP (ref 8.4–10.5)
CHLORIDE SERPL-SCNC: 106 MMOL/L — SIGNIFICANT CHANGE UP (ref 96–108)
CO2 SERPL-SCNC: 29 MMOL/L — SIGNIFICANT CHANGE UP (ref 22–31)
CREAT SERPL-MCNC: 0.73 MG/DL — SIGNIFICANT CHANGE UP (ref 0.5–1.3)
ENA SM AB FLD QL: <0.2 AI — SIGNIFICANT CHANGE UP
GLUCOSE SERPL-MCNC: 96 MG/DL — SIGNIFICANT CHANGE UP (ref 70–99)
HCT VFR BLD CALC: 22 % — LOW (ref 34.5–45)
HCT VFR BLD CALC: 24.4 % — LOW (ref 34.5–45)
HGB BLD-MCNC: 7.5 G/DL — LOW (ref 11.5–15.5)
HGB BLD-MCNC: 8.4 G/DL — LOW (ref 11.5–15.5)
MAGNESIUM SERPL-MCNC: 2.2 MG/DL — SIGNIFICANT CHANGE UP (ref 1.6–2.6)
MCHC RBC-ENTMCNC: 29.7 PG — SIGNIFICANT CHANGE UP (ref 27–34)
MCHC RBC-ENTMCNC: 29.8 PG — SIGNIFICANT CHANGE UP (ref 27–34)
MCHC RBC-ENTMCNC: 34.1 GM/DL — SIGNIFICANT CHANGE UP (ref 32–36)
MCHC RBC-ENTMCNC: 34.4 GM/DL — SIGNIFICANT CHANGE UP (ref 32–36)
MCV RBC AUTO: 86.2 FL — SIGNIFICANT CHANGE UP (ref 80–100)
MCV RBC AUTO: 87.3 FL — SIGNIFICANT CHANGE UP (ref 80–100)
MITOCHONDRIA AB SER-ACNC: SIGNIFICANT CHANGE UP
NRBC # BLD: 2 /100 WBCS — HIGH (ref 0–0)
NRBC # BLD: 2 /100 WBCS — HIGH (ref 0–0)
P-ANCA SER-ACNC: NEGATIVE — SIGNIFICANT CHANGE UP
PHOSPHATE SERPL-MCNC: 4 MG/DL — SIGNIFICANT CHANGE UP (ref 2.5–4.5)
PLATELET # BLD AUTO: 92 K/UL — LOW (ref 150–400)
PLATELET # BLD AUTO: 94 K/UL — LOW (ref 150–400)
POTASSIUM SERPL-MCNC: 3.8 MMOL/L — SIGNIFICANT CHANGE UP (ref 3.5–5.3)
POTASSIUM SERPL-SCNC: 3.8 MMOL/L — SIGNIFICANT CHANGE UP (ref 3.5–5.3)
RBC # BLD: 2.52 M/UL — LOW (ref 3.8–5.2)
RBC # BLD: 2.83 M/UL — LOW (ref 3.8–5.2)
RBC # FLD: 17.2 % — HIGH (ref 10.3–14.5)
RBC # FLD: 18.6 % — HIGH (ref 10.3–14.5)
SODIUM SERPL-SCNC: 140 MMOL/L — SIGNIFICANT CHANGE UP (ref 135–145)
WBC # BLD: 5.68 K/UL — SIGNIFICANT CHANGE UP (ref 3.8–10.5)
WBC # BLD: 5.85 K/UL — SIGNIFICANT CHANGE UP (ref 3.8–10.5)
WBC # FLD AUTO: 5.68 K/UL — SIGNIFICANT CHANGE UP (ref 3.8–10.5)
WBC # FLD AUTO: 5.85 K/UL — SIGNIFICANT CHANGE UP (ref 3.8–10.5)

## 2019-04-04 PROCEDURE — 99233 SBSQ HOSP IP/OBS HIGH 50: CPT | Mod: GC

## 2019-04-04 PROCEDURE — 88312 SPECIAL STAINS GROUP 1: CPT | Mod: 26

## 2019-04-04 PROCEDURE — 99223 1ST HOSP IP/OBS HIGH 75: CPT | Mod: 25

## 2019-04-04 PROCEDURE — 43239 EGD BIOPSY SINGLE/MULTIPLE: CPT

## 2019-04-04 PROCEDURE — 44394 COLONOSCOPY W/SNARE: CPT

## 2019-04-04 PROCEDURE — 88305 TISSUE EXAM BY PATHOLOGIST: CPT | Mod: 26

## 2019-04-04 PROCEDURE — 88291 CYTO/MOLECULAR REPORT: CPT

## 2019-04-04 RX ADMIN — Medication 100 MILLIGRAM(S): at 17:31

## 2019-04-04 RX ADMIN — CEFTRIAXONE 100 GRAM(S): 500 INJECTION, POWDER, FOR SOLUTION INTRAMUSCULAR; INTRAVENOUS at 00:30

## 2019-04-04 RX ADMIN — SENNA PLUS 2 TABLET(S): 8.6 TABLET ORAL at 21:40

## 2019-04-04 RX ADMIN — Medication 100 MILLIGRAM(S): at 06:04

## 2019-04-04 RX ADMIN — HEPARIN SODIUM 5000 UNIT(S): 5000 INJECTION INTRAVENOUS; SUBCUTANEOUS at 21:40

## 2019-04-04 RX ADMIN — Medication 650 MILLIGRAM(S): at 18:15

## 2019-04-04 RX ADMIN — Medication 25 MILLIGRAM(S): at 06:04

## 2019-04-04 RX ADMIN — Medication 650 MILLIGRAM(S): at 17:31

## 2019-04-04 RX ADMIN — PANTOPRAZOLE SODIUM 40 MILLIGRAM(S): 20 TABLET, DELAYED RELEASE ORAL at 06:04

## 2019-04-04 RX ADMIN — AZITHROMYCIN 250 MILLIGRAM(S): 500 TABLET, FILM COATED ORAL at 12:18

## 2019-04-04 NOTE — PROGRESS NOTE ADULT - SUBJECTIVE AND OBJECTIVE BOX
PGY 1 Note discussed with supervising resident and primary attending    Patient is a 74y old  Female who presents with a chief complaint of Shortness of breath (04 Apr 2019 08:39)      INTERVAL HPI/OVERNIGHT EVENTS: patient was seen and examined. She had her EGD and colonoscopy early this morning. Her Hb dropped to 7.5 this morning, will get type and screen and give 1 PRBC.     MEDICATIONS  (STANDING):  docusate sodium 100 milliGRAM(s) Oral two times a day  heparin  Injectable 5000 Unit(s) SubCutaneous every 8 hours  metoprolol succinate ER 25 milliGRAM(s) Oral daily  pantoprazole    Tablet 40 milliGRAM(s) Oral before breakfast  senna 2 Tablet(s) Oral at bedtime    MEDICATIONS  (PRN):  acetaminophen   Tablet .. 650 milliGRAM(s) Oral every 6 hours PRN Temp greater or equal to 38C (100.4F), Mild Pain (1 - 3), Moderate Pain (4 - 6)      __________________________________________________  REVIEW OF SYSTEMS:    CONSTITUTIONAL: No fever,   EYES: no acute visual disturbances  NECK: No pain or stiffness  RESPIRATORY: No cough; No shortness of breath  CARDIOVASCULAR: No chest pain, no palpitations  GASTROINTESTINAL: No pain. No nausea or vomiting; No diarrhea   NEUROLOGICAL: No headache or numbness, no tremors  MUSCULOSKELETAL: No joint pain, no muscle pain  GENITOURINARY: no dysuria, no frequency, no hesitancy  PSYCHIATRY: no depression , no anxiety  ALL OTHER  ROS negative        Vital Signs Last 24 Hrs  T(C): 36.5 (04 Apr 2019 11:02), Max: 37.4 (03 Apr 2019 19:40)  T(F): 97.7 (04 Apr 2019 11:02), Max: 99.4 (03 Apr 2019 19:40)  HR: 69 (04 Apr 2019 11:02) (69 - 83)  BP: 133/67 (04 Apr 2019 11:02) (121/56 - 138/64)  BP(mean): --  RR: 16 (04 Apr 2019 11:02) (16 - 18)  SpO2: 99% (04 Apr 2019 11:02) (98% - 100%)    ________________________________________________  PHYSICAL EXAM:  GENERAL: NAD  HEENT: Normocephalic;  conjunctivae and sclerae clear; moist mucous membranes;   NECK : supple  CHEST/LUNG: Clear to auscultation bilaterally with good air entry   HEART: S1 S2  regular; no murmurs, gallops or rubs  ABDOMEN: Soft, Nontender, Nondistended; Bowel sounds present  EXTREMITIES: no cyanosis; no edema; no calf tenderness  SKIN: warm and dry; no rash  NERVOUS SYSTEM:  Awake and alert; Oriented  to place, person and time ; no new deficits    _________________________________________________  LABS:                        7.5    5.68  )-----------( 92       ( 04 Apr 2019 06:37 )             22.0     04-04    140  |  106  |  11  ----------------------------<  96  3.8   |  29  |  0.73    Ca    8.8      04 Apr 2019 06:37  Phos  4.0     04-04  Mg     2.2     04-04    TPro  8.2  /  Alb  3.1<L>  /  TBili  1.2  /  DBili  x   /  AST  21  /  ALT  21  /  AlkPhos  62  04-03        CAPILLARY BLOOD GLUCOSE            RADIOLOGY & ADDITIONAL TESTS:    Imaging Personally Reviewed:  YES    Consultant(s) Notes Reviewed:   YES    Care Discussed with Consultants : YES     Plan of care was discussed with patient and /or primary care giver; all questions and concerns were addressed and care was aligned with patient's wishes.

## 2019-04-04 NOTE — PROGRESS NOTE ADULT - PROBLEM SELECTOR PLAN 1
-Hemoglobin this morning dropped to 7.5, will give 1 PRBC  Stool occult negative  -Normocytic, normochromic anemia, no hemolysis- haptoglobin normal.  -Anemia panel- anemia of chronic disease.   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   -Given elevated ESR, will follow autoimmune panel.   -EGD on 4/4- Mild gastritis and hiatal hernia.   -Colonoscopy- 4/4 1.5cm polyp in transverse colon- s/p polypectomy, hemorrhoids  -Repeat colonoscopy in 3 years.   -Will also check for smear and immune flow cytometry to r/o any haematologic malignancy.  -Heme onc- Dr. Moreno  -GI Dr. Amaya

## 2019-04-04 NOTE — CONSULT NOTE ADULT - ASSESSMENT
74 year old lady has sob, fever and chills for 1 week.  Hb was 7n ER with platelet 103.
74 year old female with new onset TEMPLE found ot have a normocytic anemia.     Plan:  Prepped for EGD/CF today   NPO   EGD and colon today   Further management of anemia pending results
73 y/o F PMH HTN, Rheumatic Heart Disease p/w dyspnea - admitted for symptomatic anemia - cardiology consulted for evaluation of palpitations

## 2019-04-04 NOTE — CONSULT NOTE ADULT - SUBJECTIVE AND OBJECTIVE BOX
Patient is a 74y old  Female who presents with a chief complaint of Shortness of breath (2019 12:28)  74 year old female  with a significnat past medical  history of Htn, Rheumatic heart disease, external hemorrhoids, fibroids (s/p hysterectomy. The patient presented with worsening dyspnea on exertion and fatigue.           REVIEW OF SYSTEMS  Constitutional:   No fever, no fatigue, no pallor, no night sweats, no weight loss.  HEENT:   No eye pain, no vision changes, no icterus, no mouth ulcers.  Respiratory:   No shortness of breath, no cough, no respiratory distress.   Cardiovascular:   No chest pain, no palpitations.   Gastrointestinal: No abdominal pain, no nausea, no vomiting , no diarrhea no constipation, no hematochezia, no melena.  Skin:   No rashes, no jaundice, no eczema.   Musculoskeletal:   No joint pain, no swelling, no myalgia.   Neurologic:   No headache, no seizure, no weakness.   Genitourinary:   No dysuria, no decreased urine output.  Psychiatric:  No depression, no anxiety,   Endocrine:   No thyroid disease, no diabetes.  Heme/Lymphatic:   No anemia, no blood transfusions, no lymph node enlargement, no bleeding, no bruising.  ___________________________________________________________________________________________  Allergies    No Known Allergies    Intolerances      MEDICATIONS  (STANDING):  docusate sodium 100 milliGRAM(s) Oral two times a day  heparin  Injectable 5000 Unit(s) SubCutaneous every 8 hours  metoprolol succinate ER 25 milliGRAM(s) Oral daily  pantoprazole    Tablet 40 milliGRAM(s) Oral before breakfast  senna 2 Tablet(s) Oral at bedtime    MEDICATIONS  (PRN):  acetaminophen   Tablet .. 650 milliGRAM(s) Oral every 6 hours PRN Temp greater or equal to 38C (100.4F), Mild Pain (1 - 3), Moderate Pain (4 - 6)      PAST MEDICAL & SURGICAL HISTORY:  Fibroids  H/O rheumatic heart disease  Essential hypertension  H/O abdominal hysterectomy    FAMILY HISTORY:  FH: breast cancer: sISTER    Social History: No history of : Tobacco use, IVDA, EToH  ______________________________________________________________________________________    PHYSICAL EXAM    Daily     Daily Weight in k.6 (2019 04:38)  BMI: 36.6 ( @ 17:42)  Change in Weight:  Vital Signs Last 24 Hrs  T(C): 36.5 (2019 11:02), Max: 37.4 (2019 19:40)  T(F): 97.7 (2019 11:02), Max: 99.4 (2019 19:40)  HR: 69 (2019 11:02) (69 - 83)  BP: 133/67 (2019 11:02) (121/56 - 138/64)  BP(mean): --  RR: 16 (2019 11:02) (16 - 18)  SpO2: 99% (2019 11:02) (98% - 100%)    General:  r, NAD.  Lymph Nodes:  No lymphadenopathy.   Cardiovascular:  RRR normal S1/S2, no murmur.  Respiratory:  CTA B/L, normal respiratory effort.   Abdominal:   soft, no masses or tenderness, normoactive BS, NT/ND, no HSM.  Extremities:   No clubbing or cyanosis, normal capillary refill, no edema.     _______________________________________________________________________________________________  Lab Results:                          7.5    5.68  )-----------( 92       ( 2019 06:37 )             22.0     -    140  |  106  |  11  ----------------------------<  96  3.8   |  29  |  0.73    Ca    8.8      2019 06:37  Phos  4.0     04-04  Mg     2.2     -    TPro  8.2  /  Alb  3.1<L>  /  TBili  1.2  /  DBili  x   /  AST  21  /  ALT  21  /  AlkPhos  62  04-03    LIVER FUNCTIONS - ( 2019 07:05 )  Alb: 3.1 g/dL / Pro: 8.2 g/dL / ALK PHOS: 62 U/L / ALT: 21 U/L DA / AST: 21 U/L / GGT: x                   Stool Results:          RADIOLOGY RESULTS:    EXAM:  CT ABDOMEN AND PELVIS OC                            PROCEDURE DATE:  2019          INTERPRETATION:  CLINICAL HISTORY: 74 years  Female with severe anemia.    COMPARISON: None    PROCEDURE:   CT of the Abdomen and Pelvis was performed without intravenous contrast.   Intravenous contrast: None.  Oral contrast: None.  Sagittal and coronal reformats were performed.      Evaluation of the solid organs is limited by lack of IV contrast   administration.    Survey scans of the lung bases demonstrates mild bibasilar discoid   atelectasis.. There is a small sliding hiatus hernia with thickening of   the distal esophagus. The heart is mildly enlarged.    The unenhanced spleen and pancreas are unremarkable. The liver is   enlarged measuring 17.2 cm sagittal.    There is sludge in the gallbladder.    The adrenal glands are mildly thickened.    The kidneys demonstrate no hydronephrosis or calculi.    There is no retroperitoneal adenopathy or ascites. The aorta is   atherosclerotic but normal in caliber.    The bowel demonstrates no obstruction or surrounding inflammation. There   is diffuse gastric wall thickening secondary to underdistention limiting   evaluation for underlying mass. There is mild rectal fecal impaction with   rectum distended to 7.8 cm in caliber.  The appendix is not visualized.   There is no indirect evidence of appendicitis..    There is a small fat-containing umbilical hernia.    Within the pelvis, the urinary bladder is unremarkable. The patient   status post hysterectomy. There is no pelvic adenopathy.    There are degenerative changes of the lower thoracic spine and the   bilateral sacroiliac joints.    IMPRESSION:    Evaluation of the solid organs is limited without IV contrast.    Small sliding hiatus hernia with thickened distal esophagus. Diffuse   gastric wall thickening secondary to underdistention limiting evaluation   for underlying mass. Recommend endoscopy.    Gallbladder sludge.    Mild cardiomegaly.                CHARLY JOHNSON M.D., ATTENDING RADIOLOGIST  This document has been electronically signed. 2019  1:44PM                SURGICAL PATHOLOGY:

## 2019-04-04 NOTE — PROGRESS NOTE ADULT - SUBJECTIVE AND OBJECTIVE BOX
feel ok, no fever but feel cold at times  no pain  had EGD    MEDICATIONS  (STANDING):  docusate sodium 100 milliGRAM(s) Oral two times a day  heparin  Injectable 5000 Unit(s) SubCutaneous every 8 hours  metoprolol succinate ER 25 milliGRAM(s) Oral daily  pantoprazole    Tablet 40 milliGRAM(s) Oral before breakfast  senna 2 Tablet(s) Oral at bedtime    MEDICATIONS  (PRN):  acetaminophen   Tablet .. 650 milliGRAM(s) Oral every 6 hours PRN Temp greater or equal to 38C (100.4F), Mild Pain (1 - 3), Moderate Pain (4 - 6)      Allergies    No Known Allergies    Intolerances        Vital Signs Last 24 Hrs  T(C): 37.7 (04 Apr 2019 15:51), Max: 37.7 (04 Apr 2019 15:51)  T(F): 99.8 (04 Apr 2019 15:51), Max: 99.8 (04 Apr 2019 15:51)  HR: 85 (04 Apr 2019 15:51) (69 - 85)  BP: 132/57 (04 Apr 2019 15:51) (121/56 - 138/64)  BP(mean): --  RR: 17 (04 Apr 2019 15:51) (16 - 18)  SpO2: 100% (04 Apr 2019 15:51) (98% - 100%)    PHYSICAL EXAM  General: adult in NAD  HEENT: clear oropharynx, anicteric sclera, pink conjunctiva  Neck: supple  CV: normal S1/S2 with no murmur rubs or gallops  Lungs: positive air movement b/l ant lungs,clear to auscultation, no wheezes, no rales  Abdomen: soft non-tender non-distended, no hepatosplenomegaly  Ext: no clubbing cyanosis or edema  Skin: no rashes and no petechiae  Neuro: alert and oriented X 4, no focal deficits  LABS:                          7.5    5.68  )-----------( 92       ( 04 Apr 2019 06:37 )             22.0         Mean Cell Volume : 87.3 fl  Mean Cell Hemoglobin : 29.8 pg  Mean Cell Hemoglobin Concentration : 34.1 gm/dL  Auto Neutrophil # : x  Auto Lymphocyte # : x  Auto Monocyte # : x  Auto Eosinophil # : x  Auto Basophil # : x  Auto Neutrophil % : x  Auto Lymphocyte % : x  Auto Monocyte % : x  Auto Eosinophil % : x  Auto Basophil % : x    Serial CBC  Hematocrit 22.0  Hemoglobin 7.5  Plat 92  RBC 2.52  WBC 5.68  Serial CBC  Hematocrit 23.8  Hemoglobin 8.1  Plat 84  RBC 2.74  WBC 5.40  Serial CBC  Hematocrit 25.8  Hemoglobin 9.0  Plat 95  RBC 3.00  WBC 7.00  Serial CBC  Hematocrit 23.1  Hemoglobin 8.1  Plat 83  RBC 2.69  WBC 7.96  Serial CBC  Hematocrit --  Hemoglobin --  Plat --  RBC 2.77  WBC --  Serial CBC  Hematocrit 21.4  Hemoglobin 7.5  Plat 90  RBC 2.42  WBC 9.26  Serial CBC  Hematocrit 20.0  Hemoglobin 7.0  Plat 103  RBC 2.23  WBC 9.14    04-04    140  |  106  |  11  ----------------------------<  96  3.8   |  29  |  0.73    Ca    8.8      04 Apr 2019 06:37  Phos  4.0     04-04  Mg     2.2     04-04    TPro  8.2  /  Alb  3.1<L>  /  TBili  1.2  /  DBili  x   /  AST  21  /  ALT  21  /  AlkPhos  62  04-03          Reticulocyte Percent: 1.2 % (04-02 @ 06:34)  Reticulocyte Percent: 1.4 % (04-01 @ 12:29)  Ferritin, Serum: 635 ng/mL (04-01 @ 11:46)  Folate, Serum: >20.0 ng/mL (04-01 @ 11:34)  Vitamin B12, Serum: 845 pg/mL (04-01 @ 11:34)  Iron - Total Binding Capacity.: 209 ug/dL (03-31 @ 21:24)      Serum Protein Electrophoresis Interp: Polyclonal Gammopathy (04-02 @ 10:09)  Immunofixation, Serum:   No Monoclonal Band Identified    Reference Range: None Detected (04-02 @ 10:09)  BEBE Kappa: 8.50 mg/dL (04-02 @ 10:09)  BEBE Lambda: 4.99 mg/dL (04-02 @ 10:09)        BLOOD SMEAR INTERPRETATION:       RADIOLOGY & ADDITIONAL STUDIES:

## 2019-04-04 NOTE — PROGRESS NOTE ADULT - PROBLEM SELECTOR PLAN 1
and thrombocytopenia  with elevated and abnormal monocytes  blood for flow and cytogenetic are sent  she is to get transfusion  will follow up as outpt

## 2019-04-04 NOTE — CHART NOTE - NSCHARTNOTEFT_GEN_A_CORE
:   Colonoscopy Report  Indication: Anemia    Referring: Dr. Nix   Instrument:  #[id number]  Anesthesia: MAC  Consent:  Informed consent was obtained from the patient after providing any opportunity for questions  Procedure: After placing the patient in the left lateral decubitus position, the colonoscope was gently inserted into the rectum and under direct visualization advanced to the cecum.  Color, texture, mucosa, and anatomy of the colon were carefully examined with the scope. The patient tolerated the procedure well. After completion of the exam, the patient was transferred to the recovery room.     Preparation: Miralax and bisacodyl.  Prep was fair. BBPS= R=2 T=2 L=3   Findings:   Anal Canal	External hemorrhoids    Rectum	Small internal hemorrhoids seen on retro-flexion.   Sigmoid Colon 	Normal   Descending Colon	Normal  Splenic Flexure	Normal  Transverse Colon	 1.5 pedunculated polyp. One hemoclip placed on the base of the stalk. The polyp was removed with the hot snare. Additional hemo-clip placed. The polyp was retrieved with suction   Hepatic Flexure	Normal  Ascending Colon	Normal  Cecum	Normal  Ileo-cecal Valve	Normal   Ileum 	Not seen   Date and time: 4/4/2019 9:50:33 AM  EBL:0    Impression: 1-Colon polyp 2- Hemorrhoids      Plan: 1- Resume diet and medications 2- Repeat colonoscopy in 3 years 3- Proceed to EGD     Procedure Start Time: 9:24 am  Cecum Reached Time:  9:27 am   Procedure End Time:    9:38 am    Total Withdrawal Time:  11 minutes         Attending:         Satinder Garcia M.D.   Date and Time: 4/4/2019 9:50:33 AM          ________________________         Esophagogastroduodenoscopy Report  Indication: Abnormal imaging, Anemia   Referring MD: Dr. Nix   Instrument:  #[id number]  Anesthesia: MAC  Consent:  Informed consent was obtained from the patient after providing any opportunity for questions  Procedure: The gastroscope was gently passed through the incisoral orifice into the oral cavity and under direct visualization the esophagus was intubated. The endoscope was passed down the esophagus, through the stomach and into the 3rd portion. The scope was then advanced into the jejunum.  Color, texture, mucosa and anatomy of the esophagus, stomach, and duodenum were carefully examined with the scope. The patient tolerated the procedure well. After completion of the examination, the patient was transferred to the recovery room.   Preparation: NPO   Findings:   Oropharynx	Normal  Esophagus	Normal  EG-junction	Normal z-line   3 cm hiatus hernia   Cardia	Normal  Body	Mild erythema and edema. Random biopsies taken [4]  Antrum	Mild erythema and edema. Random biopsies taken [3]  Pylorus	Normal  Duodenal Bulb	Normal  2nd portion	Normal. Biopsies taken to evaluate villous structure [2]  3rd portion	Normal  Date and time: 4/4/2019 9:52:54 AM  EBL:0  Impression: 1- Mild gastritis 2- Hiatus hernia     Plan: 1- Resume diet 2- Further anemia workup as per primary team       Procedure Start Time:  9:41 am  Procedure End Time:   9:45 am      Attending:      Satinder Garcia M.D.  Date and Time: 4/4/2019 9:52:54 AM

## 2019-04-05 VITALS
TEMPERATURE: 99 F | DIASTOLIC BLOOD PRESSURE: 69 MMHG | SYSTOLIC BLOOD PRESSURE: 160 MMHG | RESPIRATION RATE: 16 BRPM | OXYGEN SATURATION: 100 % | HEART RATE: 76 BPM

## 2019-04-05 LAB
ANA PAT FLD IF-IMP: ABNORMAL
ANA PATTERN 2: ABNORMAL
ANA TITR SER: ABNORMAL
ANION GAP SERPL CALC-SCNC: 8 MMOL/L — SIGNIFICANT CHANGE UP (ref 5–17)
ANTI NUCLEAR FACTOR TITER 2: ABNORMAL
BUN SERPL-MCNC: 9 MG/DL — SIGNIFICANT CHANGE UP (ref 7–18)
CALCIUM SERPL-MCNC: 8.7 MG/DL — SIGNIFICANT CHANGE UP (ref 8.4–10.5)
CARDIOLIPIN AB SER-ACNC: POSITIVE
CHLORIDE SERPL-SCNC: 106 MMOL/L — SIGNIFICANT CHANGE UP (ref 96–108)
CO2 SERPL-SCNC: 26 MMOL/L — SIGNIFICANT CHANGE UP (ref 22–31)
CREAT SERPL-MCNC: 0.78 MG/DL — SIGNIFICANT CHANGE UP (ref 0.5–1.3)
CULTURE RESULTS: SIGNIFICANT CHANGE UP
CULTURE RESULTS: SIGNIFICANT CHANGE UP
DSDNA AB SER-ACNC: 44 IU/ML — HIGH
GLUCOSE SERPL-MCNC: 97 MG/DL — SIGNIFICANT CHANGE UP (ref 70–99)
HCT VFR BLD CALC: 24.9 % — LOW (ref 34.5–45)
HGB BLD-MCNC: 8.5 G/DL — LOW (ref 11.5–15.5)
MAGNESIUM SERPL-MCNC: 2.1 MG/DL — SIGNIFICANT CHANGE UP (ref 1.6–2.6)
MCHC RBC-ENTMCNC: 29.7 PG — SIGNIFICANT CHANGE UP (ref 27–34)
MCHC RBC-ENTMCNC: 34.1 GM/DL — SIGNIFICANT CHANGE UP (ref 32–36)
MCV RBC AUTO: 87.1 FL — SIGNIFICANT CHANGE UP (ref 80–100)
NRBC # BLD: 2 /100 WBCS — HIGH (ref 0–0)
PHOSPHATE SERPL-MCNC: 3.9 MG/DL — SIGNIFICANT CHANGE UP (ref 2.5–4.5)
PLATELET # BLD AUTO: 89 K/UL — LOW (ref 150–400)
POTASSIUM SERPL-MCNC: 4 MMOL/L — SIGNIFICANT CHANGE UP (ref 3.5–5.3)
POTASSIUM SERPL-SCNC: 4 MMOL/L — SIGNIFICANT CHANGE UP (ref 3.5–5.3)
RBC # BLD: 2.86 M/UL — LOW (ref 3.8–5.2)
RBC # FLD: 17.6 % — HIGH (ref 10.3–14.5)
SODIUM SERPL-SCNC: 140 MMOL/L — SIGNIFICANT CHANGE UP (ref 135–145)
SPECIMEN SOURCE: SIGNIFICANT CHANGE UP
SPECIMEN SOURCE: SIGNIFICANT CHANGE UP
WBC # BLD: 5.89 K/UL — SIGNIFICANT CHANGE UP (ref 3.8–10.5)
WBC # FLD AUTO: 5.89 K/UL — SIGNIFICANT CHANGE UP (ref 3.8–10.5)

## 2019-04-05 PROCEDURE — 83615 LACTATE (LD) (LDH) ENZYME: CPT

## 2019-04-05 PROCEDURE — 88305 TISSUE EXAM BY PATHOLOGIST: CPT

## 2019-04-05 PROCEDURE — 83010 ASSAY OF HAPTOGLOBIN QUANT: CPT

## 2019-04-05 PROCEDURE — 85610 PROTHROMBIN TIME: CPT

## 2019-04-05 PROCEDURE — 86147 CARDIOLIPIN ANTIBODY EA IG: CPT

## 2019-04-05 PROCEDURE — 83521 IG LIGHT CHAINS FREE EACH: CPT

## 2019-04-05 PROCEDURE — 86431 RHEUMATOID FACTOR QUANT: CPT

## 2019-04-05 PROCEDURE — 81001 URINALYSIS AUTO W/SCOPE: CPT

## 2019-04-05 PROCEDURE — 88189 FLOWCYTOMETRY/READ 16 & >: CPT

## 2019-04-05 PROCEDURE — 83735 ASSAY OF MAGNESIUM: CPT

## 2019-04-05 PROCEDURE — 74176 CT ABD & PELVIS W/O CONTRAST: CPT

## 2019-04-05 PROCEDURE — 87205 SMEAR GRAM STAIN: CPT

## 2019-04-05 PROCEDURE — 84466 ASSAY OF TRANSFERRIN: CPT

## 2019-04-05 PROCEDURE — 82607 VITAMIN B-12: CPT

## 2019-04-05 PROCEDURE — 86200 CCP ANTIBODY: CPT

## 2019-04-05 PROCEDURE — 88184 FLOWCYTOMETRY/ TC 1 MARKER: CPT

## 2019-04-05 PROCEDURE — 86381 MITOCHONDRIAL ANTIBODY EACH: CPT

## 2019-04-05 PROCEDURE — 88312 SPECIAL STAINS GROUP 1: CPT

## 2019-04-05 PROCEDURE — 86225 DNA ANTIBODY NATIVE: CPT

## 2019-04-05 PROCEDURE — C1889: CPT

## 2019-04-05 PROCEDURE — 86235 NUCLEAR ANTIGEN ANTIBODY: CPT

## 2019-04-05 PROCEDURE — 80061 LIPID PANEL: CPT

## 2019-04-05 PROCEDURE — 84165 PROTEIN E-PHORESIS SERUM: CPT

## 2019-04-05 PROCEDURE — 85027 COMPLETE CBC AUTOMATED: CPT

## 2019-04-05 PROCEDURE — 86334 IMMUNOFIX E-PHORESIS SERUM: CPT

## 2019-04-05 PROCEDURE — 86146 BETA-2 GLYCOPROTEIN ANTIBODY: CPT

## 2019-04-05 PROCEDURE — P9040: CPT

## 2019-04-05 PROCEDURE — 86900 BLOOD TYPING SEROLOGIC ABO: CPT

## 2019-04-05 PROCEDURE — 71250 CT THORAX DX C-: CPT

## 2019-04-05 PROCEDURE — 83605 ASSAY OF LACTIC ACID: CPT

## 2019-04-05 PROCEDURE — 80053 COMPREHEN METABOLIC PANEL: CPT

## 2019-04-05 PROCEDURE — 84145 PROCALCITONIN (PCT): CPT

## 2019-04-05 PROCEDURE — 93306 TTE W/DOPPLER COMPLETE: CPT

## 2019-04-05 PROCEDURE — 82248 BILIRUBIN DIRECT: CPT

## 2019-04-05 PROCEDURE — 85652 RBC SED RATE AUTOMATED: CPT

## 2019-04-05 PROCEDURE — 88275 CYTOGENETICS 100-300: CPT

## 2019-04-05 PROCEDURE — 99285 EMERGENCY DEPT VISIT HI MDM: CPT | Mod: 25

## 2019-04-05 PROCEDURE — 86038 ANTINUCLEAR ANTIBODIES: CPT

## 2019-04-05 PROCEDURE — 86850 RBC ANTIBODY SCREEN: CPT

## 2019-04-05 PROCEDURE — 85045 AUTOMATED RETICULOCYTE COUNT: CPT

## 2019-04-05 PROCEDURE — 86036 ANCA SCREEN EACH ANTIBODY: CPT

## 2019-04-05 PROCEDURE — 36430 TRANSFUSION BLD/BLD COMPNT: CPT

## 2019-04-05 PROCEDURE — 36415 COLL VENOUS BLD VENIPUNCTURE: CPT

## 2019-04-05 PROCEDURE — 71045 X-RAY EXAM CHEST 1 VIEW: CPT

## 2019-04-05 PROCEDURE — 87521 HEPATITIS C PROBE&RVRS TRNSC: CPT

## 2019-04-05 PROCEDURE — 88285 CHROMOSOME COUNT ADDITIONAL: CPT

## 2019-04-05 PROCEDURE — 84155 ASSAY OF PROTEIN SERUM: CPT

## 2019-04-05 PROCEDURE — 88264 CHROMOSOME ANALYSIS 20-25: CPT

## 2019-04-05 PROCEDURE — 82728 ASSAY OF FERRITIN: CPT

## 2019-04-05 PROCEDURE — 83550 IRON BINDING TEST: CPT

## 2019-04-05 PROCEDURE — 87040 BLOOD CULTURE FOR BACTERIA: CPT

## 2019-04-05 PROCEDURE — 80048 BASIC METABOLIC PNL TOTAL CA: CPT

## 2019-04-05 PROCEDURE — 80074 ACUTE HEPATITIS PANEL: CPT

## 2019-04-05 PROCEDURE — 86140 C-REACTIVE PROTEIN: CPT

## 2019-04-05 PROCEDURE — 83880 ASSAY OF NATRIURETIC PEPTIDE: CPT

## 2019-04-05 PROCEDURE — 86160 COMPLEMENT ANTIGEN: CPT

## 2019-04-05 PROCEDURE — 88271 CYTOGENETICS DNA PROBE: CPT

## 2019-04-05 PROCEDURE — 86923 COMPATIBILITY TEST ELECTRIC: CPT

## 2019-04-05 PROCEDURE — 83540 ASSAY OF IRON: CPT

## 2019-04-05 PROCEDURE — 86803 HEPATITIS C AB TEST: CPT

## 2019-04-05 PROCEDURE — 84100 ASSAY OF PHOSPHORUS: CPT

## 2019-04-05 PROCEDURE — 82803 BLOOD GASES ANY COMBINATION: CPT

## 2019-04-05 PROCEDURE — 99239 HOSP IP/OBS DSCHRG MGMT >30: CPT

## 2019-04-05 PROCEDURE — 83036 HEMOGLOBIN GLYCOSYLATED A1C: CPT

## 2019-04-05 PROCEDURE — 84443 ASSAY THYROID STIM HORMONE: CPT

## 2019-04-05 PROCEDURE — 84484 ASSAY OF TROPONIN QUANT: CPT

## 2019-04-05 PROCEDURE — 88237 TISSUE CULTURE BONE MARROW: CPT

## 2019-04-05 PROCEDURE — 82272 OCCULT BLD FECES 1-3 TESTS: CPT

## 2019-04-05 PROCEDURE — 88185 FLOWCYTOMETRY/TC ADD-ON: CPT

## 2019-04-05 PROCEDURE — 93005 ELECTROCARDIOGRAM TRACING: CPT

## 2019-04-05 PROCEDURE — 88280 CHROMOSOME KARYOTYPE STUDY: CPT

## 2019-04-05 PROCEDURE — 86901 BLOOD TYPING SEROLOGIC RH(D): CPT

## 2019-04-05 PROCEDURE — 87631 RESP VIRUS 3-5 TARGETS: CPT

## 2019-04-05 PROCEDURE — 82746 ASSAY OF FOLIC ACID SERUM: CPT

## 2019-04-05 RX ORDER — PANTOPRAZOLE SODIUM 20 MG/1
1 TABLET, DELAYED RELEASE ORAL
Qty: 0 | Refills: 0 | COMMUNITY
Start: 2019-04-05

## 2019-04-05 RX ORDER — METOPROLOL TARTRATE 50 MG
1 TABLET ORAL
Qty: 0 | Refills: 0 | COMMUNITY

## 2019-04-05 RX ORDER — LIDOCAINE 4 G/100G
1 CREAM TOPICAL
Qty: 7 | Refills: 0
Start: 2019-04-05 | End: 2019-04-11

## 2019-04-05 RX ORDER — METOPROLOL TARTRATE 50 MG
1 TABLET ORAL
Qty: 30 | Refills: 0
Start: 2019-04-05 | End: 2019-05-04

## 2019-04-05 RX ORDER — LIDOCAINE 4 G/100G
1 CREAM TOPICAL DAILY
Qty: 0 | Refills: 0 | Status: DISCONTINUED | OUTPATIENT
Start: 2019-04-05 | End: 2019-04-05

## 2019-04-05 RX ORDER — AMLODIPINE BESYLATE 2.5 MG/1
5 TABLET ORAL DAILY
Qty: 0 | Refills: 0 | Status: DISCONTINUED | OUTPATIENT
Start: 2019-04-05 | End: 2019-04-05

## 2019-04-05 RX ORDER — SENNA PLUS 8.6 MG/1
2 TABLET ORAL
Qty: 60 | Refills: 0
Start: 2019-04-05 | End: 2019-05-04

## 2019-04-05 RX ORDER — LIDOCAINE 4 G/100G
1 CREAM TOPICAL
Qty: 0 | Refills: 0 | COMMUNITY
Start: 2019-04-05

## 2019-04-05 RX ORDER — DOCUSATE SODIUM 100 MG
1 CAPSULE ORAL
Qty: 60 | Refills: 0
Start: 2019-04-05 | End: 2019-05-04

## 2019-04-05 RX ORDER — SENNA PLUS 8.6 MG/1
2 TABLET ORAL
Qty: 0 | Refills: 0 | COMMUNITY
Start: 2019-04-05

## 2019-04-05 RX ORDER — DOCUSATE SODIUM 100 MG
1 CAPSULE ORAL
Qty: 0 | Refills: 0 | COMMUNITY
Start: 2019-04-05

## 2019-04-05 RX ORDER — METOPROLOL TARTRATE 50 MG
1 TABLET ORAL
Qty: 0 | Refills: 0 | COMMUNITY
Start: 2019-04-05

## 2019-04-05 RX ORDER — PANTOPRAZOLE SODIUM 20 MG/1
1 TABLET, DELAYED RELEASE ORAL
Qty: 30 | Refills: 0
Start: 2019-04-05 | End: 2019-05-04

## 2019-04-05 RX ADMIN — PANTOPRAZOLE SODIUM 40 MILLIGRAM(S): 20 TABLET, DELAYED RELEASE ORAL at 06:25

## 2019-04-05 RX ADMIN — Medication 650 MILLIGRAM(S): at 06:26

## 2019-04-05 RX ADMIN — Medication 25 MILLIGRAM(S): at 06:25

## 2019-04-05 RX ADMIN — Medication 100 MILLIGRAM(S): at 06:25

## 2019-04-05 RX ADMIN — AMLODIPINE BESYLATE 5 MILLIGRAM(S): 2.5 TABLET ORAL at 12:44

## 2019-04-05 RX ADMIN — HEPARIN SODIUM 5000 UNIT(S): 5000 INJECTION INTRAVENOUS; SUBCUTANEOUS at 06:26

## 2019-04-05 RX ADMIN — LIDOCAINE 1 PATCH: 4 CREAM TOPICAL at 12:45

## 2019-04-05 NOTE — PROGRESS NOTE ADULT - REASON FOR ADMISSION
Shortness of breath

## 2019-04-05 NOTE — PROGRESS NOTE ADULT - PROBLEM SELECTOR PLAN 3
subsided  culture neg so far  but has very high ESR  to have CT abd to r/o malignancy or abscess
takes amlodipine, but does not remember other medications  BP on lower side hold amlodipine  ECHO- EF 50-60%, grade II DD
takes amlodipine, but does not remember other medications  BP on lower side hold amlodipine  ECHO- EF 50-60%, grade II DD
-Patient's Hep C antibody came positive.   -RNA levels testing.   -CT abdomen: The liver is enlarged measuring 17.2 cm sagittal.
subsided
takes amlodipine, but does not remember other medications  BP on lower side hold amlodipine  ECHO- EF 50-60%, grade II DD

## 2019-04-05 NOTE — PROGRESS NOTE ADULT - PROBLEM SELECTOR PLAN 4
RISK                                                          Points  [  ] Previous VTE                                                3  [  ] Thrombophilia                                             2  [  ] Lower limb paralysis                                   2        (unable to hold up >15 seconds)    [  ] Current Cancer                                             2         (within 6 months)  [ x ] Immobilization > 24 hrs                              1  [  ] ICU/CCU stay > 24 hours                             1  [ x ] Age > 60                                                         1    IMPROVE VTE Score: 2  will start Heparin VTE prophylaxis as no signs of active bleeding  Protonix
right side rib pain and tender  will do CT chest
takes amlodipine, but does not remember other medications  BP on lower side hold amlodipine  ECHO- EF 50-60%, grade II DD

## 2019-04-05 NOTE — DISCHARGE NOTE PROVIDER - HOSPITAL COURSE
73 y/o F from home lives with son, ambulates independently with PMH of Htn, Rheumatic heart disease, external hemorrhoids, fibroids (s/p hysterectomy and chronic back pain presents to ED c/o shortness of breath x1 week, worsening today.        Patient states she developed sudden dyspnea on exertion and was unable to walk 2 blocks. Relieved by rest and lying down.  Her associated symptoms are increased somnolence, anorexia, dizziness and fatigue. Pt also reports having chills, runny nose and back pain today. Patient denies blood in stool, melena, hematuria, hematemesis, dysuria or any other complains. Patient never had endoscopy or colonoscopy. Patient takes 2 tablets of Advil daily for 1 month for back pain        In ED, patient's vital signs were remarkable  for Temp of 101F, Labs were remarkable for H/H 7.0/20, CXR shows B/L pulmonary congestion. EKG NSR No ST-T wave inversion. Occult negative.        Patient was admitted for symptomatic anemia, she was given total 3 PRBC during hospital stay.     Her stool occult was negative, CT abdomen and pelvis was done which showed: Small sliding hiatus hernia with thickened distal esophagus. Diffuse     gastric wall thickening secondary to underdistention limiting evaluation     for underlying mass. Recommend endoscopy. Gallbladder sludge. Mild cardiomegaly.         Gastroenterologist was consulted , EGD and colonoscopy done which showed  Mild gastritis and hiatal hernia.1.5cm polyp in transverse colon- s/p polypectomy, hemorrhoids     There is concern for hematologic malignancy, SPEP was negative for monoclonal gammopathy. Cytogenetics are sent, patient is to follow up with hemotologist/ oncologist as out-patient.         There was also concern for pneumonia on admission, she was given IV abx however CT chest showed no signs of PNA, no fever or white count, hence abx discontinued. She also had thrombocytopenia, hep C abx was positive but RNA level negative.         Given patient's improved clinical status and current hemodynamic stability, decision was made to discharge. Discussed with attending    Please refer to patient's complete medical chart with documents for a full hospital course, for this is only a brief summary. patient to f/u out-patient

## 2019-04-05 NOTE — PROGRESS NOTE ADULT - ASSESSMENT
73 y/o F from home lives with son, ambulates independently with PMHx of Htn, Rheumatic heart disease, external hemorrhoids, fibroids (s/p hysterectomy and chronic back pain presents to ED c/o shortness of breath x1 week, worsening today.    Patient is admitted for symptomatic anemia.
75 y/o F PMH HTN, Rheumatic Heart Disease p/w dyspnea - admitted for symptomatic anemia - cardiology consulted for evaluation of palpitations       Problem/Recommendation - 1:  Problem: Palpitations. Recommendation: Likely 2/2 atrial tachycardia as per telemetry findings  - Rhythm was short in duration and resolved spontaneously; suggest initial therapy with oral BB or nondihydropyridine CCBs  - Added low dose Toprol; primary team made aware  - No further need for telemetry.
75 y/o F from home lives with son, ambulates independently with PMHx of Htn, Rheumatic heart disease, external hemorrhoids, fibroids (s/p hysterectomy and chronic back pain presents to ED c/o shortness of breath x1 week, worsening today.    Patient is admitted for symptomatic anemia
73 y/o F from home lives with son, ambulates independently with PMHx of Htn, Rheumatic heart disease, external hemorrhoids, fibroids (s/p hysterectomy and chronic back pain presents to ED c/o shortness of breath x1 week, worsening today.    Patient is admitted for symptomatic anemia
73 y/o F from home lives with son, ambulates independently with PMHx of Htn, Rheumatic heart disease, external hemorrhoids, fibroids (s/p hysterectomy and chronic back pain presents to ED c/o shortness of breath x1 week, worsening today.    Patient is admitted for symptomatic anemia

## 2019-04-05 NOTE — DISCHARGE NOTE PROVIDER - NSDCCPCAREPLAN_GEN_ALL_CORE_FT
PRINCIPAL DISCHARGE DIAGNOSIS  Diagnosis: Anemia  Assessment and Plan of Treatment: You came in with shortness of breath, hemoglobin was noted to be low, you were given blood transfusion which helped relieved your symptoms. No iron defieciency was noted, no active bleeding was also identified. CT abdomen and pelvis was unremarkable. EGD and coloscopy was done which showed mild gastritis, colonoscopy showed polyp which was removed. Follow up colonoscopy should be performed in 3 years. There is concern for an underlying hemotologic disorder, work up so far has been negative. Cytogenetics are testing, you are advised to follow up with Dr REDDY, hemotologist/oncologist as out-patient within one week.      SECONDARY DISCHARGE DIAGNOSES  Diagnosis: Atrial tachycardia  Assessment and Plan of Treatment: You complained of occasional palpitaions, you were kept on 24x7 cardiac monitor. Cardiologist was consulted, you were noted to have atrial tachycardia. Medications are given which helped with symptoms. Continue taking as prescribed nad follow up with your primary medical doctor.    Diagnosis: Essential hypertension  Assessment and Plan of Treatment: You have history of hypertension, continue taking medications as prescribed.

## 2019-04-05 NOTE — PROGRESS NOTE ADULT - PROBLEM SELECTOR PLAN 1
Stool occult negative  -Normocytic, normochromic anemia, no hemolysis- haptoglobin normal.  -Anemia panel- anemia of chronic disease.   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   -Given elevated ESR, will follow autoimmune panel.   -EGD on 4/4- Mild gastritis and hiatal hernia.   -Colonoscopy- 4/4 1.5cm polyp in transverse colon- s/p polypectomy, hemorrhoids  -Repeat colonoscopy in 3 years.   -Will also check for smear and immune flow cytometry to r/o any haematologic malignancy.  -Heme onc- Dr. Moreno  -GI Dr. Amaya

## 2019-04-05 NOTE — DISCHARGE NOTE NURSING/CASE MANAGEMENT/SOCIAL WORK - NSDCDPATPORTLINK_GEN_ALL_CORE
You can access the [facility] Patient Portal, offered by [facility], by registering with the following website: [email or web address]

## 2019-04-05 NOTE — PROGRESS NOTE ADULT - PROBLEM SELECTOR PROBLEM 1
Anemia
Symptomatic anemia

## 2019-04-05 NOTE — DISCHARGE NOTE PROVIDER - CARE PROVIDER_API CALL
Cris Moreno)  Internal Medicine; Medical Oncology  [address] [place] [address]  Phone: [phone]  Fax: [phone]  Follow Up Time: 1 week

## 2019-04-05 NOTE — PROGRESS NOTE ADULT - SUBJECTIVE AND OBJECTIVE BOX
Medical attending note:     Patient is a 74y old  Female who presents with a chief complaint of Shortness of breath (04 Apr 2019 08:39)      INTERVAL HPI/OVERNIGHT EVENTS: patient was seen and examined. She had her EGD and colonoscopy early this morning. Her Hb dropped to 7.5 this morning, will get type and screen and give 1 PRBC.     MEDICATIONS  (STANDING):  docusate sodium 100 milliGRAM(s) Oral two times a day  heparin  Injectable 5000 Unit(s) SubCutaneous every 8 hours  metoprolol succinate ER 25 milliGRAM(s) Oral daily  pantoprazole    Tablet 40 milliGRAM(s) Oral before breakfast  senna 2 Tablet(s) Oral at bedtime    MEDICATIONS  (PRN):  acetaminophen   Tablet .. 650 milliGRAM(s) Oral every 6 hours PRN Temp greater or equal to 38C (100.4F), Mild Pain (1 - 3), Moderate Pain (4 - 6)      __________________________________________________  REVIEW OF SYSTEMS:    CONSTITUTIONAL: No fever,   EYES: no acute visual disturbances  NECK: No pain or stiffness  RESPIRATORY: No cough; No shortness of breath  CARDIOVASCULAR: No chest pain, no palpitations  GASTROINTESTINAL: No pain. No nausea or vomiting; No diarrhea   NEUROLOGICAL: No headache or numbness, no tremors  MUSCULOSKELETAL: No joint pain, no muscle pain  GENITOURINARY: no dysuria, no frequency, no hesitancy  PSYCHIATRY: no depression , no anxiety  ALL OTHER  ROS negative        Vital Signs Last 24 Hrs  T(C): 37.1 (05 Apr 2019 11:16), Max: 37.4 (04 Apr 2019 21:27)  T(F): 98.7 (05 Apr 2019 11:16), Max: 99.3 (04 Apr 2019 21:27)  HR: 76 (05 Apr 2019 11:16) (73 - 82)  BP: 160/69 (05 Apr 2019 11:16) (124/50 - 160/69)  BP(mean): --  RR: 16 (05 Apr 2019 11:16) (16 - 18)  SpO2: 100% (05 Apr 2019 11:16) (100% - 100%)    ________________________________________________  PHYSICAL EXAM:  GENERAL: NAD  HEENT: Normocephalic;  conjunctivae and sclerae clear; moist mucous membranes;   NECK : supple  CHEST/LUNG: Clear to auscultation bilaterally with good air entry   HEART: S1 S2  regular; no murmurs, gallops or rubs  ABDOMEN: Soft, Nontender, Nondistended; Bowel sounds present  EXTREMITIES: no cyanosis; no edema; no calf tenderness  SKIN: warm and dry; no rash  NERVOUS SYSTEM:  Awake and alert; Oriented  to place, person and time ; no new deficits    _________________________________________________                          8.5    5.89  )-----------( 89       ( 05 Apr 2019 07:11 )             24.9   04-05    140  |  106  |  9   ----------------------------<  97  4.0   |  26  |  0.78    Ca    8.7      05 Apr 2019 07:11  Phos  3.9     04-05  Mg     2.1     04-05      Plan of care was discussed with patient and /or primary care giver; all questions and concerns were addressed and care was aligned with patient's wishes.

## 2019-04-05 NOTE — PROGRESS NOTE ADULT - ATTENDING COMMENTS
Patient was examined and discussed with Dr. Robertson and with Hematology.   Vital Signs Last 24 Hrs  T(C): 37.1 (05 Apr 2019 11:16), Max: 37.4 (04 Apr 2019 21:27)  T(F): 98.7 (05 Apr 2019 11:16), Max: 99.3 (04 Apr 2019 21:27)  HR: 76 (05 Apr 2019 11:16) (73 - 82)  BP: 160/69 (05 Apr 2019 11:16) (124/50 - 160/69)  BP(mean): --  RR: 16 (05 Apr 2019 11:16) (16 - 18)  SpO2: 100% (05 Apr 2019 11:16) (100% - 100%)  Lungs, clear  Cor, RRR I/VI murmur                                   8.5    5.89  )-----------( 89       ( 05 Apr 2019 07:11 )             24.9   04-05    140  |  106  |  9   ----------------------------<  97  4.0   |  26  |  0.78    Ca    8.7      05 Apr 2019 07:11  Phos  3.9     04-05  Mg     2.1     04-05    EGD and colon are negative for bleeding or suspicious lesions.     IMP:  CHF, compensated.           Anemia, hematologic malignancy is suspected.  No evidence of blood loss or iron deficiency.   Plan:  Tx one more unit.           Flow cytometry specimen was taken yesterday by Dr. Moreno.             Discharge home.  Early f/u with PMD and Dr. Moreno.
Patient was examined and discussed with Dr. Robertson and with Hematology.     Vital Signs Last 24 Hrs  T(C): 37.7 (04 Apr 2019 15:51), Max: 37.7 (04 Apr 2019 15:51)  T(F): 99.8 (04 Apr 2019 15:51), Max: 99.8 (04 Apr 2019 15:51)  HR: 85 (04 Apr 2019 15:51) (69 - 85)  BP: 132/57 (04 Apr 2019 15:51) (121/56 - 138/64)  BP(mean): --  RR: 17 (04 Apr 2019 15:51) (16 - 18)  SpO2: 100% (04 Apr 2019 15:51) (98% - 100%)  Lungs, clear  Cor, RRR I/VI murmur                        7.5    5.68  )-----------( 92       ( 04 Apr 2019 06:37 )             22.0   04-04    140  |  106  |  11  ----------------------------<  96  3.8   |  29  |  0.73    Ca    8.8      04 Apr 2019 06:37  Phos  4.0     04-04  Mg     2.2     04-04    TPro  8.2  /  Alb  3.1<L>  /  TBili  1.2  /  DBili  x   /  AST  21  /  ALT  21  /  AlkPhos  62  04-03    EGD and colon are negative for bleeding or suspicious lesions.     IMP:  CHF, compensated.           Anemia, hematologic malignancy is suspected.  No evidence of blood loss or iron deficiency.   Plan:  Tx one more unit.           Flow cytometry will be done today.            Will consider discharge with outpatient f/u tomorrow.
Patient was interviewed and examined at the bedside and discussed with Dr. Robertson.   She is feeling less SOB, though is intermittently symptomatic.  She c/o pain on the right side of her back where she has a "knot"  Discussed with her PMD, Dr. English, she had hemoglobin of 12 one year ago.     Obese, alert, cooperative woman  Vital Signs Last 24 Hrs  T(C): 37.1 (03 Apr 2019 15:53), Max: 37.3 (02 Apr 2019 19:14)  T(F): 98.7 (03 Apr 2019 15:53), Max: 99.2 (02 Apr 2019 19:14)  HR: 82 (03 Apr 2019 15:53) (73 - 90)  BP: 131/62 (03 Apr 2019 15:53) (121/53 - 143/70)  BP(mean): --  RR: 17 (03 Apr 2019 15:53) (17 - 17)  SpO2: 100% (03 Apr 2019 15:53) (100% - 100%)  Lung,s clear, no rales  Cor, I/VI systolic murmur, RRR  Abdomen, obese, soft  Neurological, intact                        8.1    5.40  )-----------( 84       ( 03 Apr 2019 07:05 )             23.8   04-03    137  |  106  |  11  ----------------------------<  100<H>  4.1   |  28  |  0.80    Ca    8.7      03 Apr 2019 07:05  Phos  3.9     04-03  Mg     2.3     04-03    TPro  8.2  /  Alb  3.1<L>  /  TBili  1.2  /  DBili  x   /  AST  21  /  ALT  21  /  AlkPhos  62  04-03  Protein Electrophoresis, Serum (04.02.19 @ 10:09)    Protein Total, Serum: 8.1 g/dL    Total Protein, Serum: 8.1 g/dL    Albumin, Serum: 3.7 g/dL    Alpha 1: 0.5 g/dL    Alpha 2: 0.6 g/dL    Beta Globulin: 1.1 g/dL    Gamma Globulin: 2.2 g/dL    % Albumin: 45.2 %    % Alpha 1: 6.3 %    % Alpha 2: 7.9 %    % Beta: 13.8 %    % Gamma: 26.8 %    Albumin/Globulin Ratio: 0.8 Ratio    Serum Protein Electrophoresis Interp: Polyclonal Gammopathy  < from: CT Chest No Cont (04.03.19 @ 15:38) >    There are no enlarged bilateral axillary, mediastinal or hilar lymph   nodes nodes. There is trace pericardial fluid. Heart size appears   enlarged. The main pulmonary artery is mildly enlarged measuring about   3.3 cm which can be seen in the setting of pulmonary arterial   hypertension. There are no pleural effusions. There is bilateral   gynecomastia.    Evaluation of the upper abdominal organs demonstrate a small hiatal   hernia. There is oral contrast within the partially imaged portion of the   colon.    Evaluation of the lungs demonstrate bilateral mid to lower lung areas of   linear or subsegmental atelectasis within the lower lobes and the   lingula. There is no pneumonia. There are no suspicious lung nodules.   There are no central endobronchial lesions.    Evaluation of the bones demonstrate degenerative changes of the spine.    IMPRESSION: Bilateral mid to lower lung areas of linear or subsegmental   atelectasis.    < end of copied text >    IMP:  CHF, improved after transfusion.           Anemia, cause is unclear.  No active bleeding. Parameters not c/w iron deficiency.            High ESR suspicious for auto-immune disease          Patient has never had a colonoscopy.  EGD is indicated because of thickening.

## 2019-04-05 NOTE — PROGRESS NOTE ADULT - PROBLEM SELECTOR PLAN 2
getting better  may be acute infection related
subsided  but the cause of it is not clear yet.
subsided and she feels better now
-DC antibiotics  -Fever subsided  -CT scan atelectasis   -Incentive spirometry
-DC antibiotics  -Fever subsided  -CT scan atelectasis, symptoms, resolved.   -Incentive spirometry
-C.w antibiotics  -Fever subsided  -CT scan atelectasis   -Incentive spirometry
-Patient p/w fever, URTI.  -Chest X-ray- Mild increased interstitial lung markings. Atelectasis lung bases. Nonspecific right paratracheal density noted  -C/w IV antibiotics,  no further fevers.
viral load is neg

## 2019-04-05 NOTE — PROGRESS NOTE ADULT - SUBJECTIVE AND OBJECTIVE BOX
feel ok  no fever or chills  no sob still feel weak     Pt is seen and examined  pt is awake and lying in bed/out of bed to chair  pt seems comfortable and denies any complaints at this time    ROS:  Negative except for:    MEDICATIONS  (STANDING):  amLODIPine   Tablet 5 milliGRAM(s) Oral daily  docusate sodium 100 milliGRAM(s) Oral two times a day  heparin  Injectable 5000 Unit(s) SubCutaneous every 8 hours  lidocaine   Patch 1 Patch Transdermal daily  metoprolol succinate ER 25 milliGRAM(s) Oral daily  pantoprazole    Tablet 40 milliGRAM(s) Oral before breakfast  senna 2 Tablet(s) Oral at bedtime    MEDICATIONS  (PRN):  acetaminophen   Tablet .. 650 milliGRAM(s) Oral every 6 hours PRN Temp greater or equal to 38C (100.4F), Mild Pain (1 - 3), Moderate Pain (4 - 6)      Allergies    No Known Allergies    Intolerances        Vital Signs Last 24 Hrs  T(C): 37.1 (05 Apr 2019 11:16), Max: 37.4 (04 Apr 2019 21:27)  T(F): 98.7 (05 Apr 2019 11:16), Max: 99.3 (04 Apr 2019 21:27)  HR: 76 (05 Apr 2019 11:16) (73 - 82)  BP: 160/69 (05 Apr 2019 11:16) (124/50 - 160/69)  BP(mean): --  RR: 16 (05 Apr 2019 11:16) (16 - 18)  SpO2: 100% (05 Apr 2019 11:16) (100% - 100%)    PHYSICAL EXAM  General: adult in NAD  HEENT: clear oropharynx, anicteric sclera, pink conjunctiva  Neck: supple  CV: normal S1/S2 with no murmur rubs or gallops  Lungs: positive air movement b/l ant lungs,clear to auscultation, no wheezes, no rales  Abdomen: soft non-tender non-distended, no hepatosplenomegaly  Ext: no clubbing cyanosis or edema  Skin: no rashes and no petechiae  Neuro: alert and oriented X 4, no focal deficits  LABS:                          8.5    5.89  )-----------( 89       ( 05 Apr 2019 07:11 )             24.9         Mean Cell Volume : 87.1 fl  Mean Cell Hemoglobin : 29.7 pg  Mean Cell Hemoglobin Concentration : 34.1 gm/dL  Auto Neutrophil # : x  Auto Lymphocyte # : x  Auto Monocyte # : x  Auto Eosinophil # : x  Auto Basophil # : x  Auto Neutrophil % : x  Auto Lymphocyte % : x  Auto Monocyte % : x  Auto Eosinophil % : x  Auto Basophil % : x    Serial CBC  Hematocrit 24.9  Hemoglobin 8.5  Plat 89  RBC 2.86  WBC 5.89  Serial CBC  Hematocrit 24.4  Hemoglobin 8.4  Plat 94  RBC 2.83  WBC 5.85  Serial CBC  Hematocrit 22.0  Hemoglobin 7.5  Plat 92  RBC 2.52  WBC 5.68  Serial CBC  Hematocrit 23.8  Hemoglobin 8.1  Plat 84  RBC 2.74  WBC 5.40  Serial CBC  Hematocrit 25.8  Hemoglobin 9.0  Plat 95  RBC 3.00  WBC 7.00    04-05    140  |  106  |  9   ----------------------------<  97  4.0   |  26  |  0.78    Ca    8.7      05 Apr 2019 07:11  Phos  3.9     04-05  Mg     2.1     04-05            Reticulocyte Percent: 1.2 % (04-02 @ 06:34)  Reticulocyte Percent: 1.4 % (04-01 @ 12:29)  Ferritin, Serum: 635 ng/mL (04-01 @ 11:46)  Folate, Serum: >20.0 ng/mL (04-01 @ 11:34)  Vitamin B12, Serum: 845 pg/mL (04-01 @ 11:34)  Iron - Total Binding Capacity.: 209 ug/dL (03-31 @ 21:24)      Serum Protein Electrophoresis Interp: Polyclonal Gammopathy (04-02 @ 10:09)  Immunofixation, Serum:   No Monoclonal Band Identified    Reference Range: None Detected (04-02 @ 10:09)  BEBE Kappa: 8.50 mg/dL (04-02 @ 10:09)  BEBE Lambda: 4.99 mg/dL (04-02 @ 10:09)        BLOOD SMEAR INTERPRETATION:       RADIOLOGY & ADDITIONAL STUDIES:

## 2019-04-05 NOTE — PROGRESS NOTE ADULT - PROBLEM SELECTOR PROBLEM 3
Fever chills
Essential hypertension
Fever chills
Hepatitis C

## 2019-04-08 LAB — TM INTERPRETATION: SIGNIFICANT CHANGE UP

## 2019-04-09 LAB
ANA PAT FLD IF-IMP: ABNORMAL
ANA TITR SER: ABNORMAL
CCP IGG SERPL-ACNC: <8 UNITS — SIGNIFICANT CHANGE UP (ref 0–19)
RF+CCP IGG SER-IMP: NEGATIVE — SIGNIFICANT CHANGE UP

## 2019-04-14 ENCOUNTER — INPATIENT (INPATIENT)
Facility: HOSPITAL | Age: 74
LOS: 0 days | Discharge: HOME | DRG: 812 | End: 2019-04-15
Attending: HOSPITALIST | Admitting: HOSPITALIST
Payer: COMMERCIAL

## 2019-04-14 VITALS
OXYGEN SATURATION: 100 % | WEIGHT: 194.01 LBS | RESPIRATION RATE: 17 BRPM | HEART RATE: 90 BPM | HEIGHT: 63 IN | TEMPERATURE: 99 F | DIASTOLIC BLOOD PRESSURE: 69 MMHG | SYSTOLIC BLOOD PRESSURE: 147 MMHG

## 2019-04-14 DIAGNOSIS — I10 ESSENTIAL (PRIMARY) HYPERTENSION: ICD-10-CM

## 2019-04-14 DIAGNOSIS — M54.9 DORSALGIA, UNSPECIFIED: ICD-10-CM

## 2019-04-14 DIAGNOSIS — Z90.710 ACQUIRED ABSENCE OF BOTH CERVIX AND UTERUS: Chronic | ICD-10-CM

## 2019-04-14 DIAGNOSIS — D64.9 ANEMIA, UNSPECIFIED: ICD-10-CM

## 2019-04-14 DIAGNOSIS — Z29.9 ENCOUNTER FOR PROPHYLACTIC MEASURES, UNSPECIFIED: ICD-10-CM

## 2019-04-14 DIAGNOSIS — Z71.89 OTHER SPECIFIED COUNSELING: ICD-10-CM

## 2019-04-14 PROBLEM — Z86.79 PERSONAL HISTORY OF OTHER DISEASES OF THE CIRCULATORY SYSTEM: Chronic | Status: ACTIVE | Noted: 2019-03-31

## 2019-04-14 PROBLEM — D21.9 BENIGN NEOPLASM OF CONNECTIVE AND OTHER SOFT TISSUE, UNSPECIFIED: Chronic | Status: ACTIVE | Noted: 2019-03-31

## 2019-04-14 LAB
ALBUMIN SERPL ELPH-MCNC: 3 G/DL — LOW (ref 3.5–5)
ALBUMIN SERPL ELPH-MCNC: 3.2 G/DL — LOW (ref 3.5–5)
ALP SERPL-CCNC: 58 U/L — SIGNIFICANT CHANGE UP (ref 40–120)
ALP SERPL-CCNC: 63 U/L — SIGNIFICANT CHANGE UP (ref 40–120)
ALT FLD-CCNC: 19 U/L DA — SIGNIFICANT CHANGE UP (ref 10–60)
ALT FLD-CCNC: 22 U/L DA — SIGNIFICANT CHANGE UP (ref 10–60)
ANION GAP SERPL CALC-SCNC: 5 MMOL/L — SIGNIFICANT CHANGE UP (ref 5–17)
ANION GAP SERPL CALC-SCNC: 6 MMOL/L — SIGNIFICANT CHANGE UP (ref 5–17)
APPEARANCE UR: ABNORMAL
AST SERPL-CCNC: 21 U/L — SIGNIFICANT CHANGE UP (ref 10–40)
AST SERPL-CCNC: 25 U/L — SIGNIFICANT CHANGE UP (ref 10–40)
BASOPHILS # BLD AUTO: 0.02 K/UL — SIGNIFICANT CHANGE UP (ref 0–0.2)
BASOPHILS # BLD AUTO: 0.02 K/UL — SIGNIFICANT CHANGE UP (ref 0–0.2)
BASOPHILS NFR BLD AUTO: 0.3 % — SIGNIFICANT CHANGE UP (ref 0–2)
BASOPHILS NFR BLD AUTO: 0.3 % — SIGNIFICANT CHANGE UP (ref 0–2)
BILIRUB SERPL-MCNC: 1.1 MG/DL — SIGNIFICANT CHANGE UP (ref 0.2–1.2)
BILIRUB SERPL-MCNC: 1.1 MG/DL — SIGNIFICANT CHANGE UP (ref 0.2–1.2)
BILIRUB UR-MCNC: NEGATIVE — SIGNIFICANT CHANGE UP
BUN SERPL-MCNC: 14 MG/DL — SIGNIFICANT CHANGE UP (ref 7–18)
BUN SERPL-MCNC: 15 MG/DL — SIGNIFICANT CHANGE UP (ref 7–18)
CALCIUM SERPL-MCNC: 8.4 MG/DL — SIGNIFICANT CHANGE UP (ref 8.4–10.5)
CALCIUM SERPL-MCNC: 8.8 MG/DL — SIGNIFICANT CHANGE UP (ref 8.4–10.5)
CHLORIDE SERPL-SCNC: 106 MMOL/L — SIGNIFICANT CHANGE UP (ref 96–108)
CHLORIDE SERPL-SCNC: 107 MMOL/L — SIGNIFICANT CHANGE UP (ref 96–108)
CK MB BLD-MCNC: 2.8 % — SIGNIFICANT CHANGE UP (ref 0–3.5)
CK MB CFR SERPL CALC: 1.5 NG/ML — SIGNIFICANT CHANGE UP (ref 0–3.6)
CK SERPL-CCNC: 54 U/L — SIGNIFICANT CHANGE UP (ref 21–215)
CO2 SERPL-SCNC: 26 MMOL/L — SIGNIFICANT CHANGE UP (ref 22–31)
CO2 SERPL-SCNC: 27 MMOL/L — SIGNIFICANT CHANGE UP (ref 22–31)
COLOR SPEC: YELLOW — SIGNIFICANT CHANGE UP
CREAT SERPL-MCNC: 0.88 MG/DL — SIGNIFICANT CHANGE UP (ref 0.5–1.3)
CREAT SERPL-MCNC: 0.99 MG/DL — SIGNIFICANT CHANGE UP (ref 0.5–1.3)
DIFF PNL FLD: ABNORMAL
EOSINOPHIL # BLD AUTO: 0 K/UL — SIGNIFICANT CHANGE UP (ref 0–0.5)
EOSINOPHIL # BLD AUTO: 0.01 K/UL — SIGNIFICANT CHANGE UP (ref 0–0.5)
EOSINOPHIL NFR BLD AUTO: 0 % — SIGNIFICANT CHANGE UP (ref 0–6)
EOSINOPHIL NFR BLD AUTO: 0.1 % — SIGNIFICANT CHANGE UP (ref 0–6)
ERYTHROCYTE [SEDIMENTATION RATE] IN BLOOD: 94 MM/HR — HIGH (ref 0–20)
FERRITIN SERPL-MCNC: 845 NG/ML — HIGH (ref 15–150)
GLUCOSE SERPL-MCNC: 126 MG/DL — HIGH (ref 70–99)
GLUCOSE SERPL-MCNC: 156 MG/DL — HIGH (ref 70–99)
GLUCOSE UR QL: NEGATIVE — SIGNIFICANT CHANGE UP
HAPTOGLOB SERPL-MCNC: 20 MG/DL — LOW (ref 34–200)
HCT VFR BLD CALC: 20.6 % — CRITICAL LOW (ref 34.5–45)
HCT VFR BLD CALC: 23.3 % — LOW (ref 34.5–45)
HCT VFR BLD CALC: 27.6 % — LOW (ref 34.5–45)
HCV AB S/CO SERPL IA: 2.12 S/CO — HIGH (ref 0–0.99)
HCV AB SERPL-IMP: ABNORMAL
HGB BLD-MCNC: 6.7 G/DL — CRITICAL LOW (ref 11.5–15.5)
HGB BLD-MCNC: 7.7 G/DL — LOW (ref 11.5–15.5)
HGB BLD-MCNC: 9.2 G/DL — LOW (ref 11.5–15.5)
IMM GRANULOCYTES NFR BLD AUTO: 1.6 % — HIGH (ref 0–1.5)
IMM GRANULOCYTES NFR BLD AUTO: 2.1 % — HIGH (ref 0–1.5)
INR BLD: 1.31 RATIO — HIGH (ref 0.88–1.16)
IRON SATN MFR SERPL: 195 UG/DL — HIGH (ref 40–150)
IRON SATN MFR SERPL: 98 % — HIGH (ref 15–50)
KETONES UR-MCNC: NEGATIVE — SIGNIFICANT CHANGE UP
LACTATE SERPL-SCNC: 1.3 MMOL/L — SIGNIFICANT CHANGE UP (ref 0.7–2)
LDH SERPL L TO P-CCNC: 331 U/L — HIGH (ref 120–225)
LEUKOCYTE ESTERASE UR-ACNC: NEGATIVE — SIGNIFICANT CHANGE UP
LYMPHOCYTES # BLD AUTO: 1.85 K/UL — SIGNIFICANT CHANGE UP (ref 1–3.3)
LYMPHOCYTES # BLD AUTO: 2.46 K/UL — SIGNIFICANT CHANGE UP (ref 1–3.3)
LYMPHOCYTES # BLD AUTO: 24.3 % — SIGNIFICANT CHANGE UP (ref 13–44)
LYMPHOCYTES # BLD AUTO: 38.2 % — SIGNIFICANT CHANGE UP (ref 13–44)
MAGNESIUM SERPL-MCNC: 2.4 MG/DL — SIGNIFICANT CHANGE UP (ref 1.6–2.6)
MCHC RBC-ENTMCNC: 28.6 PG — SIGNIFICANT CHANGE UP (ref 27–34)
MCHC RBC-ENTMCNC: 28.9 PG — SIGNIFICANT CHANGE UP (ref 27–34)
MCHC RBC-ENTMCNC: 29.2 PG — SIGNIFICANT CHANGE UP (ref 27–34)
MCHC RBC-ENTMCNC: 32.5 GM/DL — SIGNIFICANT CHANGE UP (ref 32–36)
MCHC RBC-ENTMCNC: 33 GM/DL — SIGNIFICANT CHANGE UP (ref 32–36)
MCHC RBC-ENTMCNC: 33.3 GM/DL — SIGNIFICANT CHANGE UP (ref 32–36)
MCV RBC AUTO: 85.7 FL — SIGNIFICANT CHANGE UP (ref 80–100)
MCV RBC AUTO: 88.3 FL — SIGNIFICANT CHANGE UP (ref 80–100)
MCV RBC AUTO: 88.8 FL — SIGNIFICANT CHANGE UP (ref 80–100)
MONOCYTES # BLD AUTO: 1.59 K/UL — HIGH (ref 0–0.9)
MONOCYTES # BLD AUTO: 2.19 K/UL — HIGH (ref 0–0.9)
MONOCYTES NFR BLD AUTO: 24.7 % — HIGH (ref 2–14)
MONOCYTES NFR BLD AUTO: 28.8 % — HIGH (ref 2–14)
NEUTROPHILS # BLD AUTO: 2.27 K/UL — SIGNIFICANT CHANGE UP (ref 1.8–7.4)
NEUTROPHILS # BLD AUTO: 3.37 K/UL — SIGNIFICANT CHANGE UP (ref 1.8–7.4)
NEUTROPHILS NFR BLD AUTO: 35.2 % — LOW (ref 43–77)
NEUTROPHILS NFR BLD AUTO: 44.4 % — SIGNIFICANT CHANGE UP (ref 43–77)
NITRITE UR-MCNC: NEGATIVE — SIGNIFICANT CHANGE UP
NRBC # BLD: 2 /100 WBCS — HIGH (ref 0–0)
NT-PROBNP SERPL-SCNC: 211 PG/ML — SIGNIFICANT CHANGE UP (ref 0–450)
PH UR: 5 — SIGNIFICANT CHANGE UP (ref 5–8)
PHOSPHATE SERPL-MCNC: 3.9 MG/DL — SIGNIFICANT CHANGE UP (ref 2.5–4.5)
PLATELET # BLD AUTO: 68 K/UL — LOW (ref 150–400)
PLATELET # BLD AUTO: 68 K/UL — LOW (ref 150–400)
PLATELET # BLD AUTO: 78 K/UL — LOW (ref 150–400)
POTASSIUM SERPL-MCNC: 3.9 MMOL/L — SIGNIFICANT CHANGE UP (ref 3.5–5.3)
POTASSIUM SERPL-MCNC: 4 MMOL/L — SIGNIFICANT CHANGE UP (ref 3.5–5.3)
POTASSIUM SERPL-SCNC: 3.9 MMOL/L — SIGNIFICANT CHANGE UP (ref 3.5–5.3)
POTASSIUM SERPL-SCNC: 4 MMOL/L — SIGNIFICANT CHANGE UP (ref 3.5–5.3)
PROT SERPL-MCNC: 7.9 G/DL — SIGNIFICANT CHANGE UP (ref 6–8.3)
PROT SERPL-MCNC: 8.3 G/DL — SIGNIFICANT CHANGE UP (ref 6–8.3)
PROT UR-MCNC: 15
PROTHROM AB SERPL-ACNC: 14.7 SEC — HIGH (ref 10–12.9)
RBC # BLD: 2.32 M/UL — LOW (ref 3.8–5.2)
RBC # BLD: 2.64 M/UL — LOW (ref 3.8–5.2)
RBC # BLD: 2.64 M/UL — LOW (ref 3.8–5.2)
RBC # BLD: 3.22 M/UL — LOW (ref 3.8–5.2)
RBC # FLD: 17.2 % — HIGH (ref 10.3–14.5)
RBC # FLD: 17.5 % — HIGH (ref 10.3–14.5)
RBC # FLD: 17.8 % — HIGH (ref 10.3–14.5)
RETICS #: 39.3 K/UL — SIGNIFICANT CHANGE UP (ref 25–125)
RETICS/RBC NFR: 1.5 % — SIGNIFICANT CHANGE UP (ref 0.5–2.5)
SODIUM SERPL-SCNC: 138 MMOL/L — SIGNIFICANT CHANGE UP (ref 135–145)
SODIUM SERPL-SCNC: 139 MMOL/L — SIGNIFICANT CHANGE UP (ref 135–145)
SP GR SPEC: 1.02 — SIGNIFICANT CHANGE UP (ref 1.01–1.02)
TIBC SERPL-MCNC: 200 UG/DL — LOW (ref 250–450)
TRANSFERRIN SERPL-MCNC: 152 MG/DL — LOW (ref 200–360)
TROPONIN I SERPL-MCNC: <0.015 NG/ML — SIGNIFICANT CHANGE UP (ref 0–0.04)
UIBC SERPL-MCNC: 5 UG/DL — LOW (ref 110–370)
UROBILINOGEN FLD QL: 1
WBC # BLD: 5.46 K/UL — SIGNIFICANT CHANGE UP (ref 3.8–10.5)
WBC # BLD: 6.44 K/UL — SIGNIFICANT CHANGE UP (ref 3.8–10.5)
WBC # BLD: 7.6 K/UL — SIGNIFICANT CHANGE UP (ref 3.8–10.5)
WBC # FLD AUTO: 5.46 K/UL — SIGNIFICANT CHANGE UP (ref 3.8–10.5)
WBC # FLD AUTO: 6.44 K/UL — SIGNIFICANT CHANGE UP (ref 3.8–10.5)
WBC # FLD AUTO: 7.6 K/UL — SIGNIFICANT CHANGE UP (ref 3.8–10.5)

## 2019-04-14 PROCEDURE — 99223 1ST HOSP IP/OBS HIGH 75: CPT | Mod: GC

## 2019-04-14 PROCEDURE — 71045 X-RAY EXAM CHEST 1 VIEW: CPT | Mod: 26

## 2019-04-14 PROCEDURE — 99285 EMERGENCY DEPT VISIT HI MDM: CPT | Mod: 25

## 2019-04-14 RX ORDER — HEPARIN SODIUM 5000 [USP'U]/ML
5000 INJECTION INTRAVENOUS; SUBCUTANEOUS EVERY 8 HOURS
Qty: 0 | Refills: 0 | Status: DISCONTINUED | OUTPATIENT
Start: 2019-04-14 | End: 2019-04-15

## 2019-04-14 RX ORDER — DIAZEPAM 5 MG
5 TABLET ORAL ONCE
Qty: 0 | Refills: 0 | Status: DISCONTINUED | OUTPATIENT
Start: 2019-04-14 | End: 2019-04-14

## 2019-04-14 RX ORDER — CYCLOBENZAPRINE HYDROCHLORIDE 10 MG/1
5 TABLET, FILM COATED ORAL THREE TIMES A DAY
Qty: 0 | Refills: 0 | Status: DISCONTINUED | OUTPATIENT
Start: 2019-04-14 | End: 2019-04-15

## 2019-04-14 RX ORDER — ACETAMINOPHEN 500 MG
1000 TABLET ORAL ONCE
Qty: 0 | Refills: 0 | Status: COMPLETED | OUTPATIENT
Start: 2019-04-14 | End: 2019-04-14

## 2019-04-14 RX ORDER — CYCLOBENZAPRINE HYDROCHLORIDE 10 MG/1
5 TABLET, FILM COATED ORAL THREE TIMES A DAY
Qty: 0 | Refills: 0 | Status: DISCONTINUED | OUTPATIENT
Start: 2019-04-14 | End: 2019-04-14

## 2019-04-14 RX ORDER — SENNA PLUS 8.6 MG/1
2 TABLET ORAL AT BEDTIME
Qty: 0 | Refills: 0 | Status: DISCONTINUED | OUTPATIENT
Start: 2019-04-14 | End: 2019-04-15

## 2019-04-14 RX ORDER — PANTOPRAZOLE SODIUM 20 MG/1
40 TABLET, DELAYED RELEASE ORAL
Qty: 0 | Refills: 0 | Status: DISCONTINUED | OUTPATIENT
Start: 2019-04-14 | End: 2019-04-15

## 2019-04-14 RX ORDER — AMLODIPINE BESYLATE 2.5 MG/1
1 TABLET ORAL
Qty: 0 | Refills: 0 | COMMUNITY

## 2019-04-14 RX ORDER — LIDOCAINE 4 G/100G
1 CREAM TOPICAL DAILY
Qty: 0 | Refills: 0 | Status: DISCONTINUED | OUTPATIENT
Start: 2019-04-14 | End: 2019-04-15

## 2019-04-14 RX ORDER — FUROSEMIDE 40 MG
1 TABLET ORAL
Qty: 0 | Refills: 0 | COMMUNITY

## 2019-04-14 RX ORDER — SODIUM CHLORIDE 9 MG/ML
1000 INJECTION INTRAMUSCULAR; INTRAVENOUS; SUBCUTANEOUS
Qty: 0 | Refills: 0 | Status: DISCONTINUED | OUTPATIENT
Start: 2019-04-14 | End: 2019-04-14

## 2019-04-14 RX ORDER — DOCUSATE SODIUM 100 MG
100 CAPSULE ORAL
Qty: 0 | Refills: 0 | Status: DISCONTINUED | OUTPATIENT
Start: 2019-04-14 | End: 2019-04-15

## 2019-04-14 RX ORDER — ACETAMINOPHEN 500 MG
650 TABLET ORAL EVERY 6 HOURS
Qty: 0 | Refills: 0 | Status: DISCONTINUED | OUTPATIENT
Start: 2019-04-14 | End: 2019-04-15

## 2019-04-14 RX ORDER — GABAPENTIN 400 MG/1
300 CAPSULE ORAL
Qty: 0 | Refills: 0 | Status: DISCONTINUED | OUTPATIENT
Start: 2019-04-14 | End: 2019-04-15

## 2019-04-14 RX ORDER — MORPHINE SULFATE 50 MG/1
4 CAPSULE, EXTENDED RELEASE ORAL ONCE
Qty: 0 | Refills: 0 | Status: DISCONTINUED | OUTPATIENT
Start: 2019-04-14 | End: 2019-04-14

## 2019-04-14 RX ORDER — KETOROLAC TROMETHAMINE 30 MG/ML
15 SYRINGE (ML) INJECTION ONCE
Qty: 0 | Refills: 0 | Status: DISCONTINUED | OUTPATIENT
Start: 2019-04-14 | End: 2019-04-14

## 2019-04-14 RX ORDER — GABAPENTIN 400 MG/1
300 CAPSULE ORAL DAILY
Qty: 0 | Refills: 0 | Status: DISCONTINUED | OUTPATIENT
Start: 2019-04-14 | End: 2019-04-14

## 2019-04-14 RX ORDER — METOPROLOL TARTRATE 50 MG
0 TABLET ORAL
Qty: 30 | Refills: 0 | COMMUNITY

## 2019-04-14 RX ADMIN — LIDOCAINE 1 PATCH: 4 CREAM TOPICAL at 20:30

## 2019-04-14 RX ADMIN — CYCLOBENZAPRINE HYDROCHLORIDE 5 MILLIGRAM(S): 10 TABLET, FILM COATED ORAL at 22:34

## 2019-04-14 RX ADMIN — Medication 5 MILLIGRAM(S): at 02:28

## 2019-04-14 RX ADMIN — GABAPENTIN 300 MILLIGRAM(S): 400 CAPSULE ORAL at 17:11

## 2019-04-14 RX ADMIN — Medication 1000 MILLIGRAM(S): at 04:25

## 2019-04-14 RX ADMIN — CYCLOBENZAPRINE HYDROCHLORIDE 5 MILLIGRAM(S): 10 TABLET, FILM COATED ORAL at 17:11

## 2019-04-14 RX ADMIN — Medication 15 MILLIGRAM(S): at 02:28

## 2019-04-14 RX ADMIN — SENNA PLUS 2 TABLET(S): 8.6 TABLET ORAL at 22:34

## 2019-04-14 RX ADMIN — HEPARIN SODIUM 5000 UNIT(S): 5000 INJECTION INTRAVENOUS; SUBCUTANEOUS at 22:34

## 2019-04-14 RX ADMIN — LIDOCAINE 1 PATCH: 4 CREAM TOPICAL at 21:25

## 2019-04-14 RX ADMIN — PANTOPRAZOLE SODIUM 40 MILLIGRAM(S): 20 TABLET, DELAYED RELEASE ORAL at 08:25

## 2019-04-14 RX ADMIN — CYCLOBENZAPRINE HYDROCHLORIDE 5 MILLIGRAM(S): 10 TABLET, FILM COATED ORAL at 11:51

## 2019-04-14 RX ADMIN — HEPARIN SODIUM 5000 UNIT(S): 5000 INJECTION INTRAVENOUS; SUBCUTANEOUS at 17:12

## 2019-04-14 RX ADMIN — Medication 100 MILLIGRAM(S): at 17:11

## 2019-04-14 RX ADMIN — Medication 15 MILLIGRAM(S): at 04:25

## 2019-04-14 RX ADMIN — MORPHINE SULFATE 4 MILLIGRAM(S): 50 CAPSULE, EXTENDED RELEASE ORAL at 05:43

## 2019-04-14 RX ADMIN — MORPHINE SULFATE 4 MILLIGRAM(S): 50 CAPSULE, EXTENDED RELEASE ORAL at 06:50

## 2019-04-14 RX ADMIN — Medication 400 MILLIGRAM(S): at 02:28

## 2019-04-14 RX ADMIN — LIDOCAINE 1 PATCH: 4 CREAM TOPICAL at 08:25

## 2019-04-14 NOTE — H&P ADULT - PROBLEM SELECTOR PLAN 2
- spasm in her back in right thoracic region  - c/w Tylenol , Lidocaine patch and Gabapentin - spasm in her back in right thoracic region  - c/w Tylenol , Lidocaine patch and Flexerel

## 2019-04-14 NOTE — H&P ADULT - HISTORY OF PRESENT ILLNESS
74 year old female  from home lives with son, ambulates independently PMH of HTN, anemia, Rheumatic heart disease, external hemorrhoids, fibroids (s/p hysterectomy) and chronic back pain presents with acute on chronic back pain worse x 3 days. Located R thoracic area. Worse with movement and palpation. Tried Tylenol w/o relief. Also had episode of dizziness early today while going to the bathroom. Recently admitted for anemia and had transfusion. Deniesother acute complaints. Patient denies blood in stool, melena, hematuria, hematemesis, dysuria , trauma, fever, weakness, chest pain, abd pain, incontinence, or any other complains.   In ED patient's vital signs were unremarkable   Labs were remarkable for H/H 6.7/20.6 ,  EKG NSR No ST-T wave inversion.     Goals of care: Full code 74 year old female  from home lives with son, ambulates independently PMH of HTN, anemia, Rheumatic heart disease, external hemorrhoids, fibroids (s/p hysterectomy) and chronic back pain presents with acute on chronic back pain 7 out of 10  worse for the last  3 days. Located R thoracic area. Worse with movement and palpation. Tried Tylenol w/o relief. Also had episode of dizziness early today while going to the bathroom. Recently admitted for anemia and had transfusion. Deniesother acute complaints. Patient denies blood in stool, melena, hematuria, hematemesis, dysuria , trauma, fever, weakness, chest pain, abd pain, incontinence, or any other complains.   In ED patient's vital signs were unremarkable   Labs were remarkable for H/H 6.7/20.6 ,  EKG NSR No ST-T wave inversion.   started on 1 PRBC,     Goals of care: Full code

## 2019-04-14 NOTE — ED PROVIDER NOTE - CLINICAL SUMMARY MEDICAL DECISION MAKING FREE TEXT BOX
73 yo F with back pain. No concerning features/red flags. Will dose pain meds, labs given episode of dizziness, reassess.

## 2019-04-14 NOTE — H&P ADULT - ATTENDING COMMENTS
Patient seen and examined.   Vital signs and labs reviewed.   Plan of care as above and it discussed with patient and with Dr. Mccann PGY1.   Spoke to Dr. Moreno -oncologist. patient is undergoing  outpatient workup for possible MDS.   Plan of care as above, except: will give another unit of PRBC and will get xray of thoracic and lumbar spine.

## 2019-04-14 NOTE — ED ADULT NURSE NOTE - NSIMPLEMENTINTERV_GEN_ALL_ED
Implemented All Fall Risk Interventions:  [place] to call system. Call bell, personal items and telephone within reach. Instruct patient to call for assistance. Room bathroom lighting operational. Non-slip footwear when patient is off stretcher. Physically safe environment: no spills, clutter or unnecessary equipment. Stretcher in lowest position, wheels locked, appropriate side rails in place. Provide visual cue, wrist band, yellow gown, etc. Monitor gait and stability. Monitor for mental status changes and reorient to person, place, and time. Review medications for side effects contributing to fall risk. Reinforce activity limits and safety measures with patient and family.

## 2019-04-14 NOTE — H&P ADULT - NSHPPHYSICALEXAM_GEN_ALL_CORE
Vital Signs Last 24 Hrs  T(C): 37.1 (14 Apr 2019 05:08), Max: 37.4 (14 Apr 2019 00:54)  T(F): 98.8 (14 Apr 2019 05:08), Max: 99.3 (14 Apr 2019 00:54)  HR: 73 (14 Apr 2019 05:08) (73 - 90)  BP: 126/58 (14 Apr 2019 05:08) (126/58 - 147/69)  BP(mean): --  RR: 16 (14 Apr 2019 05:08) (16 - 18)  SpO2: 100% (14 Apr 2019 05:08) (100% - 100%) · Constitutional	detailed exam  · Constitutional Details	no distress; obese  · Eyes	detailed exam  · Eyes Details	PERRL  · Eyes Comments	Pale conjunctiva,  · Neck	No bruits; no thyromegaly or nodules  · Respiratory	Breath Sounds equal & clear to percussion & auscultation, no accessory muscle use  · Cardiovascular	Regular rate & rhythm, normal S1, S2; no murmurs, gallops or rubs; no S3, S4  · Gastrointestinal	detailed exam  · GI Normal	soft; nontender; no masses palpable; bowel sounds normal Abdominal scar for hysterectomy  · Extremities	detailed exam  · Extremities Details	no clubbing; no cyanosis; no edema  · Skin	No lesions; no rash  · Lymph Nodes	No lymphoadenopathy    Vital Signs Last 24 Hrs  T(C): 37.1 (14 Apr 2019 05:08), Max: 37.4 (14 Apr 2019 00:54)  T(F): 98.8 (14 Apr 2019 05:08), Max: 99.3 (14 Apr 2019 00:54)  HR: 73 (14 Apr 2019 05:08) (73 - 90)  BP: 126/58 (14 Apr 2019 05:08) (126/58 - 147/69)  BP(mean): --  RR: 16 (14 Apr 2019 05:08) (16 - 18)  SpO2: 100% (14 Apr 2019 05:08) (100% - 100%)

## 2019-04-14 NOTE — H&P ADULT - PROBLEM SELECTOR PLAN 4
RISK                                                          Points  [  ] Previous VTE                                                3  [  ] Thrombophilia                                             2  [  ] Lower limb paralysis                                   2        (unable to hold up >15 seconds)    [  ] Current Cancer                                             2         (within 6 months)  [ x ] Immobilization > 24 hrs                              1  [  ] ICU/CCU stay > 24 hours                             1  [ x ] Age > 60                                                         1    IMPROVE VTE Score: 2  will start Heparin VTE prophylaxis as no signs of active bleeding  Protonix.

## 2019-04-14 NOTE — ED PROVIDER NOTE - NS ED ROS FT
CONSTITUTIONAL: no fever, no chills   EYES: no visual changes, no eye pain   ENMT: no nasal congestion, no throat pain  CARDIOVASCULAR: no chest pain, no edema, no palpitations   RESPIRATORY: no shortness of breath, no cough   GASTROINTESTINAL: no abdominal pain, no nausea, no vomiting, no diarrhea, no constipation   GENITOURINARY: no dysuria, no frequency  MUSCULOSKELETAL: no joint pains, no myalgias, +back pain   SKIN: no rashes  NEUROLOGICAL: no weakness, no headache, no dizziness, no slurred speech, no syncope   PSYCHIATRIC: no known mental health illness   HEME/LYMPH: no lymphadenopathy      All other ROS negative except as per HPI

## 2019-04-14 NOTE — H&P ADULT - ASSESSMENT
74 year old female  from home lives with son, ambulates independently PMH of HTN, anemia, Rheumatic heart disease, external hemorrhoids, fibroids (s/p hysterectomy) and chronic back pain presents with acute on chronic back pain worse x 3 days. Located R thoracic area. Worse with movement and palpation. Tried Tylenol w/o relief. Also had episode of dizziness early today while going to the bathroom. Recently admitted for anemia and had transfusion. Deniesother acute complaints. Patient denies blood in stool, melena, hematuria, hematemesis, dysuria , trauma, fever, weakness, chest pain, abd pain, incontinence, or any other complains.   In ED patient's vital signs were unremarkable   Labs were remarkable for H/H 6.7/20.6 ,  EKG NSR No ST-T wave inversion.     Goals of care: Full code     pr is admitted for management of symptomatic anemia 74 year old female  from home lives with son, ambulates independently PMH of HTN, anemia, Rheumatic heart disease, external hemorrhoids, fibroids (s/p hysterectomy) and chronic back pain presents with acute on chronic back pain worse x 3 days. Located R thoracic area. Worse with movement and palpation. Tried Tylenol w/o relief. Also had episode of dizziness early today while going to the bathroom. Recently admitted for anemia and had transfusion. Deniesother acute complaints. Patient denies blood in stool, melena, hematuria, hematemesis, dysuria , trauma, fever, weakness, chest pain, abd pain, incontinence, or any other complains.   In ED patient's vital signs were unremarkable   Labs were remarkable for H/H 6.7/20.6   EKG NSR No ST-T wave inversion.   started on 1 PRBC,     pr is admitted for management of anemia

## 2019-04-14 NOTE — ED ADULT NURSE NOTE - ED STAT RN HANDOFF DETAILS
pt.remained  stable .denies pain. hgb 6.7 to start 1 st unit of PRBC. transfer  to holding  area.report given to maria teresa womack. not  in  distress

## 2019-04-14 NOTE — H&P ADULT - PROBLEM SELECTOR PLAN 3
- Pt taking Metoprolol 50 mg and amlodipine 5 mg  - BP on lower side   - hold Metoprolol and amlodipine  - Monitor BP closely and adjust medications if clinically indicated.  Dash diet. - Pt taking Metoprolol succinate 25 mg and amlodipine 5 mg  - BP on lower side   - hold Metoprolol and amlodipine  - Monitor BP closely and adjust medications if clinically indicated.  Dash diet.

## 2019-04-14 NOTE — ED PROVIDER NOTE - OBJECTIVE STATEMENT
73 yo F pmh of HTN, anemia, and back pain presents with acute on chronic back pain worse x 3 days. Located R thoracic area. Worse with movement and palpation. Tried tylenol w/o relief. Also had episode of dizziness early today while going to the bathroom. Recently admitted for anemia and had transfusion. Denies trauma, fever, weakness, chest pain, abd pain, incontinence, other acute complaints.

## 2019-04-14 NOTE — ED PROVIDER NOTE - PHYSICAL EXAMINATION
GENERAL: wells appearing, no acute distress when still; yelling in pain when moving   HEAD: atraumatic   EYES: EOMI, pink conjunctiva   ENT: moist oral mucosa   CARDIAC: RRR, no edema, distal pulses present   RESPIRATORY: lungs CTAB, no increased work of breathing   GASTROINTESTINAL: no abdominal tenderness, no rebound or guarding, bowel sounds presents  GENITOURINARY: no CVA tenderness   MUSCULOSKELETAL: no deformity; no midline back ttp or step offs; R thoracic paraspinal ttp   NEUROLOGICAL: AAOx3, CN's II-XII intact, strength 5/5 bilateral UE and LE, sensation intact to light touch, finger to nose intact, unable to assess gait 2/2 to pain   SKIN: intact   PSYCHIATRIC: cooperative  HEME LYMPH: no lymphadenopathy

## 2019-04-14 NOTE — ED PROVIDER NOTE - PROGRESS NOTE DETAILS
labs - Hgb 6.7  Pt consented for transfusion of PRBCs; consent placed in pt chart. Will admit to hospitalist group (readmission) for symptomatic anemia and back pain with ambulatory dysfunction. Endorsed to Dr Parry and MAR.

## 2019-04-14 NOTE — H&P ADULT - PROBLEM SELECTOR PLAN 1
p/w H/H of 7.0/20 MCV: 89.7with dyspnea, dizziness, somnolence, fatigue -- likely symptomatic normocytic anemia --> unknown etiology could be due to chronic bleeding from hemorrhoids as Rectal PE shows hemorrhoids  Occult negative although patient takes advil daily  Patient was having chronic low H/H as per Last admissions  F/u Anemia panel  Transfuse 2 PRBC with lasix  2 IV line  CBC monitoring  Heme onc: Dr. Moreno. - p/w H/H of H/H 6.7/20.6 ,  - MCV: 88.8   - Normocytic, normochromic anemia  - normal bilirubin  - Patient was having chronic low H/H as per Last admissions  - denies any weight loss.  - patient not actively bleeding.   - Transfusing 1 PRBC  - 2 IV line  - CBC monitoring  - transfuse as needed if become symptomatic or hg<7  - serial abd exams  - f/u on post transfusion CBC , CBC q8  - f/u occult Stool , haptoglobin   - f/u Anemia panel  - recent EGD on 4/4 : Mild gastritis and hiatal hernia.   - recent Colonoscopy 4/4 : 1.5cm polyp in transverse colon (s/p polypectomy) , hemorrhoids  - Heme onc consulted Dr. Moreno  - GI consulted Dr. Amaya. - p/w H/H of H/H 6.7/20.6 ,  - MCV: 88.8   - Normocytic, normochromic anemia  - normal bilirubin  - Patient was having chronic low H/H as per Last admissions  - recent EGD on 4/4 : Mild gastritis and hiatal hernia.   - recent Colonoscopy 4/4 : 1.5cm polyp in transverse colon (s/p polypectomy) , hemorrhoids  - denies any weight loss.  - patient not actively bleeding.   - Transfusing 1 PRBC  - 2 IV line  - CBC monitoring  - transfuse as needed if become symptomatic or hg<7  - serial abd exams  - f/u on post transfusion CBC , CBC q8  - f/u occult Stool , haptoglobin   - f/u Anemia panel  ** Heme onc consulted Dr. Moreno  ** GI consulted Dr. Amaya. - p/w H/H of H/H 6.7/20.6 ,  - MCV: 88.8   - Normocytic, normochromic anemia   - normal bilirubin  - Patient was having chronic low H/H beside thrombocytopenia as per Last admissions  - recent EGD on 4/4 : Mild gastritis and hiatal hernia.   - recent Colonoscopy 4/4 : 1.5cm polyp in transverse colon (s/p polypectomy) , hemorrhoids  - denies any weight loss.  - patient not actively bleeding.   - Transfusing 1 PRBC  - 2 IV line  - CBC monitoring  - transfuse as needed if become symptomatic or hg<7  - serial abd exams  - f/u on post transfusion CBC , CBC q8  - f/u occult Stool , haptoglobin   - f/u Anemia panel  ** Heme onc consulted Dr. Moreno  ** GI consulted Dr. Cortez - p/w H/H of H/H 6.7/20.6 ,  - MCV: 88.8   - Normocytic, normochromic anemia   - normal bilirubin  - Patient was having chronic low H/H beside thrombocytopenia as per Last admissions  - recent EGD on 4/4 : Mild gastritis and hiatal hernia.   - recent Colonoscopy 4/4 : 1.5cm polyp in transverse colon (s/p polypectomy) , hemorrhoids  - denies any weight loss.  - patient not actively bleeding.   - Transfusing 1 PRBC  - 2 IV line  - CBC monitoring  - transfuse as needed if become symptomatic or hg<7  - serial abd exams  - f/u on post transfusion CBC , CBC q8  - f/u occult Stool , haptoglobin   - f/u Anemia panel

## 2019-04-15 VITALS
TEMPERATURE: 99 F | RESPIRATION RATE: 16 BRPM | SYSTOLIC BLOOD PRESSURE: 132 MMHG | DIASTOLIC BLOOD PRESSURE: 80 MMHG | OXYGEN SATURATION: 100 % | HEART RATE: 90 BPM

## 2019-04-15 DIAGNOSIS — M54.9 DORSALGIA, UNSPECIFIED: ICD-10-CM

## 2019-04-15 DIAGNOSIS — I10 ESSENTIAL (PRIMARY) HYPERTENSION: ICD-10-CM

## 2019-04-15 LAB
ANION GAP SERPL CALC-SCNC: 6 MMOL/L — SIGNIFICANT CHANGE UP (ref 5–17)
BUN SERPL-MCNC: 13 MG/DL — SIGNIFICANT CHANGE UP (ref 7–18)
CALCIUM SERPL-MCNC: 8.5 MG/DL — SIGNIFICANT CHANGE UP (ref 8.4–10.5)
CHLORIDE SERPL-SCNC: 107 MMOL/L — SIGNIFICANT CHANGE UP (ref 96–108)
CO2 SERPL-SCNC: 26 MMOL/L — SIGNIFICANT CHANGE UP (ref 22–31)
CREAT SERPL-MCNC: 0.81 MG/DL — SIGNIFICANT CHANGE UP (ref 0.5–1.3)
GLUCOSE SERPL-MCNC: 109 MG/DL — HIGH (ref 70–99)
HCT VFR BLD CALC: 26.4 % — LOW (ref 34.5–45)
HGB BLD-MCNC: 8.8 G/DL — LOW (ref 11.5–15.5)
INTERPRETATION SERPL IFE-IMP: SIGNIFICANT CHANGE UP
MAGNESIUM SERPL-MCNC: 2.3 MG/DL — SIGNIFICANT CHANGE UP (ref 1.6–2.6)
MCHC RBC-ENTMCNC: 28.7 PG — SIGNIFICANT CHANGE UP (ref 27–34)
MCHC RBC-ENTMCNC: 33.3 GM/DL — SIGNIFICANT CHANGE UP (ref 32–36)
MCV RBC AUTO: 86 FL — SIGNIFICANT CHANGE UP (ref 80–100)
NRBC # BLD: 2 /100 WBCS — HIGH (ref 0–0)
OB PNL STL: POSITIVE
PHOSPHATE SERPL-MCNC: 2.9 MG/DL — SIGNIFICANT CHANGE UP (ref 2.5–4.5)
PLATELET # BLD AUTO: 70 K/UL — LOW (ref 150–400)
POTASSIUM SERPL-MCNC: 4.2 MMOL/L — SIGNIFICANT CHANGE UP (ref 3.5–5.3)
POTASSIUM SERPL-SCNC: 4.2 MMOL/L — SIGNIFICANT CHANGE UP (ref 3.5–5.3)
RBC # BLD: 3.07 M/UL — LOW (ref 3.8–5.2)
RBC # FLD: 17.8 % — HIGH (ref 10.3–14.5)
SODIUM SERPL-SCNC: 139 MMOL/L — SIGNIFICANT CHANGE UP (ref 135–145)
WBC # BLD: 5.5 K/UL — SIGNIFICANT CHANGE UP (ref 3.8–10.5)
WBC # FLD AUTO: 5.5 K/UL — SIGNIFICANT CHANGE UP (ref 3.8–10.5)

## 2019-04-15 PROCEDURE — 88313 SPECIAL STAINS GROUP 2: CPT | Mod: 26

## 2019-04-15 PROCEDURE — 88341 IMHCHEM/IMCYTCHM EA ADD ANTB: CPT | Mod: 26,59

## 2019-04-15 PROCEDURE — 88291 CYTO/MOLECULAR REPORT: CPT

## 2019-04-15 PROCEDURE — 99239 HOSP IP/OBS DSCHRG MGMT >30: CPT | Mod: GC

## 2019-04-15 PROCEDURE — 85097 BONE MARROW INTERPRETATION: CPT

## 2019-04-15 PROCEDURE — 88360 TUMOR IMMUNOHISTOCHEM/MANUAL: CPT | Mod: 26

## 2019-04-15 PROCEDURE — 88305 TISSUE EXAM BY PATHOLOGIST: CPT | Mod: 26

## 2019-04-15 PROCEDURE — 88342 IMHCHEM/IMCYTCHM 1ST ANTB: CPT | Mod: 26,59

## 2019-04-15 PROCEDURE — 72070 X-RAY EXAM THORAC SPINE 2VWS: CPT | Mod: 26

## 2019-04-15 RX ORDER — METOPROLOL TARTRATE 50 MG
25 TABLET ORAL DAILY
Qty: 0 | Refills: 0 | Status: DISCONTINUED | OUTPATIENT
Start: 2019-04-15 | End: 2019-04-15

## 2019-04-15 RX ORDER — AMLODIPINE BESYLATE 2.5 MG/1
5 TABLET ORAL DAILY
Qty: 0 | Refills: 0 | Status: DISCONTINUED | OUTPATIENT
Start: 2019-04-15 | End: 2019-04-15

## 2019-04-15 RX ORDER — GABAPENTIN 400 MG/1
1 CAPSULE ORAL
Qty: 0 | Refills: 0 | DISCHARGE
Start: 2019-04-15

## 2019-04-15 RX ORDER — GABAPENTIN 400 MG/1
0 CAPSULE ORAL
Qty: 60 | Refills: 0 | COMMUNITY

## 2019-04-15 RX ADMIN — GABAPENTIN 300 MILLIGRAM(S): 400 CAPSULE ORAL at 17:07

## 2019-04-15 RX ADMIN — AMLODIPINE BESYLATE 5 MILLIGRAM(S): 2.5 TABLET ORAL at 13:34

## 2019-04-15 RX ADMIN — Medication 25 MILLIGRAM(S): at 13:34

## 2019-04-15 RX ADMIN — Medication 100 MILLIGRAM(S): at 17:07

## 2019-04-15 RX ADMIN — Medication 100 MILLIGRAM(S): at 05:51

## 2019-04-15 RX ADMIN — HEPARIN SODIUM 5000 UNIT(S): 5000 INJECTION INTRAVENOUS; SUBCUTANEOUS at 05:50

## 2019-04-15 RX ADMIN — CYCLOBENZAPRINE HYDROCHLORIDE 5 MILLIGRAM(S): 10 TABLET, FILM COATED ORAL at 05:50

## 2019-04-15 RX ADMIN — GABAPENTIN 300 MILLIGRAM(S): 400 CAPSULE ORAL at 05:51

## 2019-04-15 RX ADMIN — HEPARIN SODIUM 5000 UNIT(S): 5000 INJECTION INTRAVENOUS; SUBCUTANEOUS at 13:10

## 2019-04-15 RX ADMIN — PANTOPRAZOLE SODIUM 40 MILLIGRAM(S): 20 TABLET, DELAYED RELEASE ORAL at 07:57

## 2019-04-15 RX ADMIN — LIDOCAINE 1 PATCH: 4 CREAM TOPICAL at 11:31

## 2019-04-15 RX ADMIN — CYCLOBENZAPRINE HYDROCHLORIDE 5 MILLIGRAM(S): 10 TABLET, FILM COATED ORAL at 13:10

## 2019-04-15 NOTE — PROGRESS NOTE ADULT - PROBLEM SELECTOR PLAN 4
RISK                                                          Points  [  ] Previous VTE                                                3  [  ] Thrombophilia                                             2  [  ] Lower limb paralysis                                   2        (unable to hold up >15 seconds)    [  ] Current Cancer                                             2         (within 6 months)  [ x ] Immobilization > 24 hrs                              1  [  ] ICU/CCU stay > 24 hours                             1  [ x ] Age > 60                                                         1    IMPROVE VTE Score: 2  Heparin SQ for VTE prophylaxis as no signs of active bleeding  Protonix for GI prophylaxis for gastritis.

## 2019-04-15 NOTE — PROGRESS NOTE ADULT - ASSESSMENT
74 year old female  from home lives with son, ambulates independently PMH of HTN, anemia, Rheumatic heart disease, external hemorrhoids, fibroids (s/p hysterectomy) and chronic back pain presents with acute on chronic back pain worse x 3 days and dizziness found to have low Hb. Received 2 PRBC and H/H improved appropriately. PT's back pain is better awaiting X ray of thoracic spine.

## 2019-04-15 NOTE — PROGRESS NOTE ADULT - PROBLEM SELECTOR PLAN 2
- spasm in her back in right thoracic region  - c/w Tylenol , Lidocaine patch and Flexeril  - pending X ray of thoracic spine.

## 2019-04-15 NOTE — DISCHARGE NOTE NURSING/CASE MANAGEMENT/SOCIAL WORK - NSDCDPATPORTLINK_GEN_ALL_CORE
You can access the [facility] Patient Portal, offered by [facility], by registering with the following website: [email or web address]

## 2019-04-15 NOTE — PROGRESS NOTE ADULT - ATTENDING COMMENTS
Patient seen and examined at bedside, feels ok. She is asking to do BM biopsy here. Discussed with Dr. Moreno, will proceed in afternoon.  ? Developing MDS. She has low platelets and Haptoglobin. Total bilirubin : 1.1. Normal renal and Neurological functions.   Physical exam:  Vital Signs Last 24 Hrs  T(C): 37 (15 Apr 2019 14:41), Max: 37.1 (15 Apr 2019 05:33)  T(F): 98.6 (15 Apr 2019 14:41), Max: 98.7 (15 Apr 2019 05:33)  HR: 90 (15 Apr 2019 14:41) (79 - 90)  BP: 132/80 (15 Apr 2019 14:41) (132/80 - 140/61)  BP(mean): --  RR: 16 (15 Apr 2019 14:41) (16 - 18)  SpO2: 100% (15 Apr 2019 14:41) (96% - 100%)  A/P  1- Symptomatic anemia: S/p 2 units PRBC correcting normal  Denies seeing any blood in stools or urine  Hematology input appreciated. ? MDS, pending BM biopsy    2- Back pain: most likely right paravertebral muscle spasm; continue with flexeril.  3- HTN: as above

## 2019-04-15 NOTE — DISCHARGE NOTE PROVIDER - HOSPITAL COURSE
74 year old female  from home, ambulates independently PMH of HTN, anemia, Rheumatic heart disease, external hemorrhoids, fibroids (s/p hysterectomy) and chronic back pain presented with acute on chronic back pain 7 out of 10  worse for the last  3 days. Located R thoracic area. Worse with movement and palpation. Tried Tylenol w/o relief. Also had episode of dizziness while going to the bathroom. Recently pt was admitted for anemia and had PRBC transfusion with EGD and Colonoscopy s/o Mild gastritis and Hiatus hernia. Colon polyp- pathology s/o tubular adenoma and Hemorrhoids respectively.     ED course: patient's vital signs were unremarkable, Labs were remarkable for H/H 6.7/20.6 ,    EKG NSR No ST-T wave inversion. Pt was admitted to medicine floor for management of symptomatic anemia. Received 2 PRBC and H/H improved appropriately. on evaluation of anemia, was found to have elevated LDH and low haptoglobin levels, low reticulocyte count, presence of NRBC- do not seem to be hemolysis, more likely to be MDS and ineffective hematopoiesis, Plan for BM biopsy as outpatient. CBC showed increased monocytes.  Flow showed small number of blasts. Could be related to CMML, need to follow up with hem onch as outpatient.     Pt was also c/o back pain, which improved with analgesics, pt had  X ray of thoracic spine which s/o [id number].       Pt is recommended to follow up with hem onch DR Moreno as outpatient for BM biopsy.    Patient is stable for discharge per attending and is advised to follow up with PCP as outpatient     Please refer to patient's complete medical chart with documents for a full hospital course, for this is only a brief summary. 74 year old female  from home, ambulates independently PMH of HTN, anemia, Rheumatic heart disease, external hemorrhoids, fibroids (s/p hysterectomy) and chronic back pain presented with acute on chronic back pain 7 out of 10  worse for the last  3 days. Located R thoracic area. Worse with movement and palpation. Tried Tylenol w/o relief. Also had episode of dizziness while going to the bathroom. Recently pt was admitted for anemia and had PRBC transfusion with EGD and Colonoscopy s/o Mild gastritis and Hiatus hernia. Colon polyp- pathology s/o tubular adenoma and Hemorrhoids respectively.     ED course: patient's vital signs were unremarkable, Labs were remarkable for H/H 6.7/20.6 ,    EKG NSR No ST-T wave inversion. Pt was admitted to medicine floor for management of symptomatic anemia. Received 2 PRBC and H/H improved appropriately. on evaluation of anemia, was found to have elevated LDH and low haptoglobin levels, low reticulocyte count, presence of NRBC- do not seem to be hemolysis, more likely to be MDS and ineffective hematopoiesis, Plan for BM biopsy as outpatient. CBC showed increased monocytes.  Flow showed small number of blasts. Could be related to CMML, need to follow up with hem onch as outpatient.     Pt was also c/o back pain, which improved with analgesics, pt had  X ray of thoracic spine which s/o no acute pathology.  Pt had BM biopsy prior to discharge.     Pt is recommended to follow up with hem onch DR Moreno as outpatient for BM biopsy results.    Patient is stable for discharge per attending and is advised to follow up with PCP as outpatient     Please refer to patient's complete medical chart with documents for a full hospital course, for this is only a brief summary.

## 2019-04-15 NOTE — MEDICAL STUDENT PROGRESS NOTE(EDUCATION) - NS MD HP STUD ASPLAN ASSES FT
75 y/o female w/ PMH of HTN, Anemia, Rheumatic Heart Disease, External Hemorrhoids, Fibroids (s/p hysterectomy), and chronic back pain presents w/ chief complaint of acute back spasms starting 3 days ago, rated 7/10 in severity, worsening w/ movement, and localized to R. paraspinal area from SI joint to mid thoracic region. Naproxen and Tylenol do not provide relief, but currently managed with topical 5% Lidocaine patch.             8.8    5.50  )-----------( 70                    26.4     139  |  107  |  13  ----------------------------<  109  4.2   |  26  |  0.81    Nucleated RBC: 2 / Transferrin: 152 / Ferritin: 845 / TIBC: 200 / LDH: 331 / Haptoglobin: 20 / Cardiolipin ab positive / FOBT positive    Ca    8.5 / Phos  2.9 / Mg  2.3  / Alb  3.0  /  TBili  1.1/ AST  21  /  ALT  19  /  Alk Phos  58 / PT: 14.7 sec / INR: 1.31 ratio      CT Chest and Abdomen w/ oral contrast: Small sliding hiatus hernia with thickened distal esophagus, Diffuse gastric wall thickening secondary to underdistention, Gallbladder sludge, Mild cardiomegaly.    Chest X-Ray: Bibasilar Segmental Atelectasis    TTE: Mild mitral regurge; Calcified Aortic Root w/ EF: 55- 60%; Mild Tricuspid Regurge; Mild Pulmonic regurge    EKG: Normal sinus rhythm

## 2019-04-15 NOTE — DISCHARGE NOTE PROVIDER - CARE PROVIDER_API CALL
Cris Moreno)  Internal Medicine; Medical Oncology  [address] [place] [address]  Phone: [phone]  Fax: [phone]  Follow Up Time:

## 2019-04-15 NOTE — CONSULT NOTE ADULT - ASSESSMENT
74 year old lady was admitted for anemia and back pain.  She was admitted earlier this m for anemia,  CBC showed increased monocytes.  Flow showed small number of blasts.

## 2019-04-15 NOTE — CONSULT NOTE ADULT - SUBJECTIVE AND OBJECTIVE BOX
Patient is a 74y old  Female who presents with a chief complaint of Anemia (14 Apr 2019 05:12)      HPI:  74 year old female  from home lives with son, ambulates independently PMH of HTN, anemia, Rheumatic heart disease, external hemorrhoids, fibroids (s/p hysterectomy) and chronic back pain presents with acute on chronic back pain 7 out of 10  worse for the last  3 days. Located R thoracic area. Worse with movement and palpation. Tried Tylenol w/o relief. Also had episode of dizziness early today while going to the bathroom. Recently admitted for anemia and had transfusion. Deniesother acute complaints. Patient denies blood in stool, melena, hematuria, hematemesis, dysuria , trauma, fever, weakness, chest pain, abd pain, incontinence, or any other complains. she was found to be anemic again with Hb 6.7.  In ED patient's vital signs were unremarkable   Labs were remarkable for H/H 6.7/20.6 ,  EKG NSR No ST-T wave inversion.   started on 1 PRBC,     Goals of care: Full code (14 Apr 2019 05:12)       ROS:  Negative except for:    PAST MEDICAL & SURGICAL HISTORY:  Fibroids  H/O rheumatic heart disease  Essential hypertension  H/O abdominal hysterectomy      SOCIAL HISTORY:    FAMILY HISTORY:  FH: breast cancer: sISTER      MEDICATIONS  (STANDING):  cyclobenzaprine 5 milliGRAM(s) Oral three times a day  docusate sodium 100 milliGRAM(s) Oral two times a day  gabapentin 300 milliGRAM(s) Oral two times a day  heparin  Injectable 5000 Unit(s) SubCutaneous every 8 hours  lidocaine   Patch 1 Patch Transdermal daily  pantoprazole    Tablet 40 milliGRAM(s) Oral before breakfast  senna 2 Tablet(s) Oral at bedtime    MEDICATIONS  (PRN):  acetaminophen   Tablet .. 650 milliGRAM(s) Oral every 6 hours PRN Temp greater or equal to 38C (100.4F), Mild Pain (1 - 3), Moderate Pain (4 - 6)      Allergies    No Known Allergies    Intolerances        Vital Signs Last 24 Hrs  T(C): 37.1 (15 Apr 2019 05:33), Max: 37.1 (15 Apr 2019 05:33)  T(F): 98.7 (15 Apr 2019 05:33), Max: 98.7 (15 Apr 2019 05:33)  HR: 86 (15 Apr 2019 05:33) (70 - 86)  BP: 139/65 (15 Apr 2019 05:33) (121/65 - 140/61)  BP(mean): --  RR: 16 (15 Apr 2019 05:33) (16 - 18)  SpO2: 96% (15 Apr 2019 05:33) (96% - 100%)    PHYSICAL EXAM  General: adult in NAD  HEENT: clear oropharynx, anicteric sclera, pink conjunctiva  Neck: supple  CV: normal S1/S2 with no murmur rubs or gallops  Lungs: positive air movement b/l ant lungs,clear to auscultation, no wheezes, no rales  Abdomen: soft non-tender non-distended, no hepatosplenomegaly  Ext: no clubbing cyanosis or edema  Skin: no rashes and no petechiae  Neuro: alert and oriented X 4, no focal deficits      LABS:                          8.8    5.50  )-----------( 70       ( 15 Apr 2019 06:04 )             26.4         Mean Cell Volume : 86.0 fl  Mean Cell Hemoglobin : 28.7 pg  Mean Cell Hemoglobin Concentration : 33.3 gm/dL  Auto Neutrophil # : x  Auto Lymphocyte # : x  Auto Monocyte # : x  Auto Eosinophil # : x  Auto Basophil # : x  Auto Neutrophil % : x  Auto Lymphocyte % : x  Auto Monocyte % : x  Auto Eosinophil % : x  Auto Basophil % : x      Serial CBC's  04-15 @ 06:04  Hct-26.4 / Hgb-8.8 / Plat-70 / RBC-3.07 / WBC-5.50  Serial CBC's  04-14 @ 21:13  Hct-27.6 / Hgb-9.2 / Plat-68 / RBC-3.22 / WBC-5.46  Serial CBC's  04-14 @ 10:32  Hct-23.3 / Hgb-7.7 / Plat-68 / RBC-2.64 / WBC-6.44  Serial CBC's  04-14 @ 02:33  Hct-20.6 / Hgb-6.7 / Plat-78 / RBC-2.32 / WBC-7.60      04-15    139  |  107  |  13  ----------------------------<  109<H>  4.2   |  26  |  0.81    Ca    8.5      15 Apr 2019 06:04  Phos  2.9     04-15  Mg     2.3     04-15    TPro  7.9  /  Alb  3.0<L>  /  TBili  1.1  /  DBili  x   /  AST  21  /  ALT  19  /  AlkPhos  58  04-14      PT/INR - ( 14 Apr 2019 10:32 )   PT: 14.7 sec;   INR: 1.31 ratio             Ferritin, Serum: 845 ng/mL (04-14 @ 14:15)  Reticulocyte Percent: 1.5 % (04-14 @ 10:32)  Iron - Total Binding Capacity.: 200 ug/dL (04-14 @ 10:32)              BLOOD SMEAR INTERPRETATION:       RADIOLOGY & ADDITIONAL STUDIES:

## 2019-04-15 NOTE — MEDICAL STUDENT PROGRESS NOTE(EDUCATION) - NS MD HP STUD ASPLAN PLAN FT
1) Back pain: Dantrolene (muscle relaxant) + PT consult. Back pain etiology seems of musculoskeletal origin.   2) Anemia: Intravascular hemolysis not underlying cause due to high LDH, Low Haptoglobin, low reticulocyte count. 2 PRBC units showed improvement, so anemia likely due to underproduction/ MDS  3) HTN: 5mg Amlodipine + 25 mg Metoprolol (home medication); BP must be observed (139/65) DDx: 1) Paraspinal muscular spasm 2) Osteodegenerative changes in Thoracic, Lumbar, and/or SI Joint    1) Back pain:  Back pain etiology seems of musculoskeletal origin due to the location, periodicity, and lack of significant abnormal findings elsewhere.               Dantrolene (muscle relaxant) + PT consult. There is possibility for degenerative changes, so possible X-Ray of thorax   2) Anemia: Intravascular hemolysis not underlying cause due to high LDH, Low Haptoglobin, low reticulocyte count. 2 PRBC units showed improvement, so anemia likely due to underproduction/ MDS  	Bone Marrow Biopsy to confirm  3) HTN: BP must be observed (139/65)              5mg Amlodipine + 25 mg Metoprolol Succinate(home medication) DDx: 1) Paraspinal muscular spasm 2) Osteodegenerative changes in Thoracic, Lumbar, and/or SI Joint    1) Anemia: Intravascular hemolysis not underlying cause due to high LDH, Low Haptoglobin, low reticulocyte count. 2 PRBC units showed improvement, so anemia likely due to underproduction/ MDS  	Bone Marrow Biopsy to confirm    2) Back pain:  Back pain etiology seems of musculoskeletal origin due to the location, periodicity, and lack of significant abnormal findings elsewhere.               Dantrolene (muscle relaxant) + PT consult. There is possibility for degenerative changes, so possible X-Ray of thorax     3) HTN: BP must be observed (139/65)              5mg Amlodipine + 25 mg Metoprolol Succinate(home medication)

## 2019-04-15 NOTE — CONSULT NOTE ADULT - PROBLEM SELECTOR RECOMMENDATION 9
had transfusion,he feels much better now  low ret, presence of NRBC, low hapto, high LDH  it is not hemolysis  more likely to be MDS and ineffective hematopoisis  will need to do a BM  it can be done as outpt had transfusion,he feels much better now  low ret, presence of NRBC, low hapto, high LDH  it is not hemolysis  Bone marrow asp done at sternum under sterile condition and local lidocine.  Marrow sent for flow, cytogenetics and clot was sent for path  more likely to be MDS and ineffective hematopoisis  will need to do a BM  it can be done as outpt

## 2019-04-15 NOTE — PROGRESS NOTE ADULT - PROBLEM SELECTOR PLAN 1
- H/H improved appropriately after 2 PRBC today it is 8.8/26  - pt has elevated LDH and low haptoglobin levels, low reticulocyte count, presence of NRBC- do not seem to be hemolysis.  more likely to be MDS and ineffective hematopoiesis.  Plan for BM biopsy as outpatient.  CBC showed increased monocytes.  Flow showed small number of blasts. Could be related to CMML, need to follow up with hem onch as outpatient.

## 2019-04-15 NOTE — PROGRESS NOTE ADULT - SUBJECTIVE AND OBJECTIVE BOX
Resident Note discussed with  primary attending    Patient is a 74y old  Female who presents with a chief complaint of Anemia (15 Apr 2019 09:42)      INTERVAL /OVERNIGHT EVENTS: no new complaints overnight. This AM pt feels better, denies any new symptoms. Pt reports that her back pain in mid back is better than before.      MEDICATIONS  (STANDING):  cyclobenzaprine 5 milliGRAM(s) Oral three times a day  docusate sodium 100 milliGRAM(s) Oral two times a day  gabapentin 300 milliGRAM(s) Oral two times a day  heparin  Injectable 5000 Unit(s) SubCutaneous every 8 hours  lidocaine   Patch 1 Patch Transdermal daily  pantoprazole    Tablet 40 milliGRAM(s) Oral before breakfast  senna 2 Tablet(s) Oral at bedtime    MEDICATIONS  (PRN):  acetaminophen   Tablet .. 650 milliGRAM(s) Oral every 6 hours PRN Temp greater or equal to 38C (100.4F), Mild Pain (1 - 3), Moderate Pain (4 - 6)      __________________________________________________  REVIEW OF SYSTEMS:    CONSTITUTIONAL: No fever  EYES: no acute visual disturbances  NECK: No pain or stiffness  RESPIRATORY: No cough; No shortness of breath  CARDIOVASCULAR: No chest pain, no palpitations  GASTROINTESTINAL: No pain. No nausea or vomiting; No diarrhea   NEUROLOGICAL: No headache or numbness, no tremors  MUSCULOSKELETAL: No joint pain, no muscle pain  GENITOURINARY: no dysuria, no frequency, no hesitancy  PSYCHIATRY: no depression , no anxiety  ALL OTHER  ROS negative        Vital Signs Last 24 Hrs  T(C): 37.1 (15 Apr 2019 05:33), Max: 37.1 (15 Apr 2019 05:33)  T(F): 98.7 (15 Apr 2019 05:33), Max: 98.7 (15 Apr 2019 05:33)  HR: 86 (15 Apr 2019 05:33) (70 - 86)  BP: 139/65 (15 Apr 2019 05:33) (121/65 - 140/61)  BP(mean): --  RR: 16 (15 Apr 2019 05:33) (16 - 18)  SpO2: 96% (15 Apr 2019 05:33) (96% - 100%)    ________________________________________________  PHYSICAL EXAM:  GENERAL: NAD  HEENT:Normocephalic;  conjunctivae and sclerae clear; moist mucous membranes;   NECK : supple  CHEST/LUNG: Clear to auscuitation bilaterally with good air entry   HEART: S1 S2  regular; no murmurs, gallops or rubs  ABDOMEN: Soft, Nontender, Nondistended; Bowel sounds present  EXTREMITIES: no cyanosis; no edema; no calf tenderness  NERVOUS SYSTEM:  Awake and alert; Oriented  to place, person and time ; no new deficits    _________________________________________________  LABS:                        8.8    5.50  )-----------( 70       ( 15 Apr 2019 06:04 )             26.4     04-15    139  |  107  |  13  ----------------------------<  109<H>  4.2   |  26  |  0.81    Ca    8.5      15 Apr 2019 06:04  Phos  2.9     04-15  Mg     2.3     04-15    TPro  7.9  /  Alb  3.0<L>  /  TBili  1.1  /  DBili  x   /  AST  21  /  ALT  19  /  AlkPhos  58  04-14    PT/INR - ( 2019 10:32 )   PT: 14.7 sec;   INR: 1.31 ratio           Urinalysis Basic - ( 2019 16:59 )    Color: Yellow / Appearance: very cloudy / S.025 / pH: x  Gluc: x / Ketone: Negative  / Bili: Negative / Urobili: 1   Blood: x / Protein: 15 / Nitrite: Negative   Leuk Esterase: Negative / RBC: 2-5 /HPF / WBC 6-10 /HPF   Sq Epi: x / Non Sq Epi: Moderate /HPF / Bacteria: Few /HPF      CAPILLARY BLOOD GLUCOSE            RADIOLOGY & ADDITIONAL TESTS:    Imaging Personally Reviewed:  YES/NO    Consultant(s) Notes Reviewed:   YES/ No    Care Discussed with Consultants :     Plan of care was discussed with patient and /or primary care giver; all questions and concerns were addressed and care was aligned with patient's wishes. Resident Note discussed with  primary attending    Patient is a 74y old  Female who presents with a chief complaint of Anemia (15 Apr 2019 09:42)      INTERVAL /OVERNIGHT EVENTS: no new complaints overnight. This AM pt feels better, denies any new symptoms. Pt reports that her back pain in mid back is better than before.      MEDICATIONS  (STANDING):  cyclobenzaprine 5 milliGRAM(s) Oral three times a day  docusate sodium 100 milliGRAM(s) Oral two times a day  gabapentin 300 milliGRAM(s) Oral two times a day  heparin  Injectable 5000 Unit(s) SubCutaneous every 8 hours  lidocaine   Patch 1 Patch Transdermal daily  pantoprazole    Tablet 40 milliGRAM(s) Oral before breakfast  senna 2 Tablet(s) Oral at bedtime    MEDICATIONS  (PRN):  acetaminophen   Tablet .. 650 milliGRAM(s) Oral every 6 hours PRN Temp greater or equal to 38C (100.4F), Mild Pain (1 - 3), Moderate Pain (4 - 6)      __________________________________________________  REVIEW OF SYSTEMS:    CONSTITUTIONAL: No fever  EYES: no acute visual disturbances  NECK: No pain or stiffness  RESPIRATORY: No cough; No shortness of breath  CARDIOVASCULAR: No chest pain, no palpitations  GASTROINTESTINAL: No pain. No nausea or vomiting; No diarrhea   NEUROLOGICAL: No headache or numbness, no tremors  MUSCULOSKELETAL: mid back pain.  GENITOURINARY: no dysuria, no frequency, no hesitancy  ALL OTHER  ROS negative        Vital Signs Last 24 Hrs  T(C): 37.1 (15 Apr 2019 05:33), Max: 37.1 (15 Apr 2019 05:33)  T(F): 98.7 (15 Apr 2019 05:33), Max: 98.7 (15 Apr 2019 05:33)  HR: 86 (15 Apr 2019 05:33) (70 - 86)  BP: 139/65 (15 Apr 2019 05:33) (121/65 - 140/61)  BP(mean): --  RR: 16 (15 Apr 2019 05:33) (16 - 18)  SpO2: 96% (15 Apr 2019 05:33) (96% - 100%)    ________________________________________________  PHYSICAL EXAM:  GENERAL: Pt lying comfortably in bed, not in distress  HEENT:Normocephalic;  conjunctivae and sclerae clear; moist mucous membranes;   NECK : supple  CHEST/LUNG: Clear to auscultation bilaterally with good air entry   HEART: S1 S2  regular;   ABDOMEN: Soft, Nontender, Nondistended; Bowel sounds present  EXTREMITIES: no cyanosis; no edema; no calf tenderness, tenderness in paravertebral region over mid thoracic region.  NERVOUS SYSTEM:  Awake and alert; Oriented  to place, person and time ; no new deficits    _________________________________________________  LABS:                        8.8    5.50  )-----------( 70       ( 15 Apr 2019 06:04 )             26.4     04-15    139  |  107  |  13  ----------------------------<  109<H>  4.2   |  26  |  0.81    Ca    8.5      15 Apr 2019 06:04  Phos  2.9     04-15  Mg     2.3     04-15    TPro  7.9  /  Alb  3.0<L>  /  TBili  1.1  /  DBili  x   /  AST  21  /  ALT  19  /  AlkPhos  58  04-14    PT/INR - ( 2019 10:32 )   PT: 14.7 sec;   INR: 1.31 ratio           Urinalysis Basic - ( 2019 16:59 )    Color: Yellow / Appearance: very cloudy / S.025 / pH: x  Gluc: x / Ketone: Negative  / Bili: Negative / Urobili: 1   Blood: x / Protein: 15 / Nitrite: Negative   Leuk Esterase: Negative / RBC: 2-5 /HPF / WBC 6-10 /HPF   Sq Epi: x / Non Sq Epi: Moderate /HPF / Bacteria: Few /HPF      CAPILLARY BLOOD GLUCOSE            RADIOLOGY & ADDITIONAL TESTS: Awaiting X ray thoracic spine.      Consultant(s) Notes Reviewed:   YES    Care Discussed with Consultants : YES    Plan of care was discussed with patient and /or primary care giver; all questions and concerns were addressed and care was aligned with patient's wishes.

## 2019-04-15 NOTE — DISCHARGE NOTE PROVIDER - NSDCCPCAREPLAN_GEN_ALL_CORE_FT
PRINCIPAL DISCHARGE DIAGNOSIS  Diagnosis: Symptomatic anemia  Assessment and Plan of Treatment: You were admitted with dizzines and was found to have low hemoglobin levels. You received 2 blood transfusions and your hemoglobin levels improved. hematology was consulted who recommended that  you should have bone marrow biopsy to be performed as outpatient.  recommend to follow up with Dr Moreno as outpatient with in a week of discharge.      SECONDARY DISCHARGE DIAGNOSES  Diagnosis: Back pain  Assessment and Plan of Treatment: You presented with back pain. you were managed with pain medications and your pain got better. You had X ray of upper back which showed [id number]. recommend to follow up with PCP .    Diagnosis: Hypertension  Assessment and Plan of Treatment: recommend to continue home dose of lopressor and amlodipine. Weight loss, exercise as tolerated. advise to follow up with PCP within a week of discharge.

## 2019-04-15 NOTE — MEDICAL STUDENT PROGRESS NOTE(EDUCATION) - SUBJECTIVE AND OBJECTIVE BOX
75 y/o female w/ PMH of HTN, Anemia, Rheumatic Heart Disease, External Hemorrhoids, Fibroids (s/p hysterectomy), and chronic back pain presents w/ chief complaint of acute back spasms starting 3 days ago, rated 7/10 in severity, worsening w/ movement, and localized to R. paraspinal area from SI joint to mid thoracic region. Naproxen and Tylenol do not provide relief, but currently managed with topical 5% Lidocaine patch. Pt. complained of another episode s/p Chest x-ray. Denies fevers, weight loss, weakness, nausea, vomiting, diarrhea, dizziness, chest pain, palpitations, SOB, hematemesis, hematuria, melena.     Currently pt is in no acute distress, has a walker, but is able to ambulate independently.     ROS: Denies fevers, weight loss, weakness, nausea, vomiting, diarrhea, dizziness, chest pain, palpitations, SOB, hematemesis, hematuria, melena    PMH: HTN, Anemia, Rheumatic Heart Disease, External Hemorrhoids, Fibroids, and chronic back pain    PSH: Total Abdominal hysterectomy    FH: Sister diagnosed w/ breast cancer at 68 y/o    SH: Lives w/ son; denies tobacco product use; denies EtOH use; denies illicit substance use; No recent travel; works as a cafaterian in an [employer]    A: NKA    Meds: 40 mg Pantoprazole, oral 1x/d; 25 mg Metoprolol 1x/d; 70 mg Aldendronate,oral, 1x/wk; 5% topical Lidocaine patch; 5mg Amlodipine 1x/d          Objective:  98.7 F / 86 HR / 139/65 BP / 16 RR / 96 SpO2    General: No acute distress; well groomed ; appears stated age; uses walking aid    HEENT: Normocephalic Atraumatic; PERRLA; EOMI; Good dentition; Tongue midline; Neck supple; No thyromegaly; no LAD    Heart: +s1/s2 RRR ; AS murmur noted; no M/R/G; no JVD; no pitting edema     Lung: Normal AP diameter; CTA b/l ; no W/R/R     GI: Obese, nondistended; Non tender; Soft; Normoactive Bowel Sounds +4 quadrants 75 y/o female w/ PMH of HTN, Anemia, Rheumatic Heart Disease, External Hemorrhoids, Fibroids (s/p hysterectomy), and chronic back pain presents w/ chief complaint of acute back spasms starting 3 days ago, rated 7/10 in severity, worsening w/ movement, and localized to R. paraspinal area from SI joint to mid thoracic region. Naproxen and Tylenol do not provide relief, but currently managed with topical 5% Lidocaine patch. Pt. complained of another episode s/p Chest x-ray. Denies fevers, weight loss, weakness, nausea, vomiting, diarrhea, dizziness, chest pain, palpitations, SOB, hematemesis, hematuria, melena.     Currently pt is in no acute distress, has a walker, but is able to ambulate independently.     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    A: NKA    Meds: 40 mg Pantoprazole, oral 1x/d; 25 mg Metoprolol Succinate 1x/d; 70 mg Aldendronate,oral, 1x/wk; 5% topical Lidocaine patch; 5mg Amlodipine 1x/d          Objective:  98.7 F / 86 HR / 139/65 BP / 16 RR / 96 SpO2    General: No acute distress; well groomed ; appears stated age; uses walking aid    HEENT: Normocephalic Atraumatic; PERRLA; EOMI; Good dentition; Tongue midline; Neck supple; No thyromegaly; no LAD    Heart: +s1/s2 RRR ; AS murmur noted; no M/R/G; no JVD; no pitting edema     Lung: Normal AP diameter; CTA b/l ; no W/R/R     GI: Obese, nondistended; Non tender; Soft; Normoactive Bowel Sounds +4 quadrants

## 2019-04-18 LAB
CULTURE RESULTS: SIGNIFICANT CHANGE UP
SPECIMEN SOURCE: SIGNIFICANT CHANGE UP
TM INTERPRETATION: SIGNIFICANT CHANGE UP

## 2019-04-24 LAB
CHROM ANALY OVERALL INTERP SPEC-IMP: SIGNIFICANT CHANGE UP
LYMPHOCYTE PROLIF PNL BLD: ABNORMAL

## 2019-04-26 ENCOUNTER — EMERGENCY (EMERGENCY)
Facility: HOSPITAL | Age: 74
LOS: 1 days | Discharge: HOME | End: 2019-04-26
Attending: EMERGENCY MEDICINE
Payer: COMMERCIAL

## 2019-04-26 VITALS
RESPIRATION RATE: 18 BRPM | OXYGEN SATURATION: 100 % | DIASTOLIC BLOOD PRESSURE: 79 MMHG | SYSTOLIC BLOOD PRESSURE: 132 MMHG | HEIGHT: 62 IN | TEMPERATURE: 99 F | WEIGHT: 195.11 LBS | HEART RATE: 76 BPM

## 2019-04-26 DIAGNOSIS — Z90.710 ACQUIRED ABSENCE OF BOTH CERVIX AND UTERUS: Chronic | ICD-10-CM

## 2019-04-26 LAB
ALBUMIN SERPL ELPH-MCNC: 3.4 G/DL — LOW (ref 3.5–5)
ALP SERPL-CCNC: 66 U/L — SIGNIFICANT CHANGE UP (ref 40–120)
ALT FLD-CCNC: 19 U/L DA — SIGNIFICANT CHANGE UP (ref 10–60)
ANION GAP SERPL CALC-SCNC: 6 MMOL/L — SIGNIFICANT CHANGE UP (ref 5–17)
AST SERPL-CCNC: 24 U/L — SIGNIFICANT CHANGE UP (ref 10–40)
BASOPHILS # BLD AUTO: 0 K/UL — SIGNIFICANT CHANGE UP (ref 0–0.2)
BASOPHILS NFR BLD AUTO: 0 % — SIGNIFICANT CHANGE UP (ref 0–2)
BILIRUB DIRECT SERPL-MCNC: 0.4 MG/DL — HIGH (ref 0–0.2)
BILIRUB SERPL-MCNC: 1.7 MG/DL — HIGH (ref 0.2–1.2)
BUN SERPL-MCNC: 9 MG/DL — SIGNIFICANT CHANGE UP (ref 7–18)
CALCIUM SERPL-MCNC: 8.6 MG/DL — SIGNIFICANT CHANGE UP (ref 8.4–10.5)
CHLORIDE SERPL-SCNC: 105 MMOL/L — SIGNIFICANT CHANGE UP (ref 96–108)
CO2 SERPL-SCNC: 27 MMOL/L — SIGNIFICANT CHANGE UP (ref 22–31)
CREAT SERPL-MCNC: 0.8 MG/DL — SIGNIFICANT CHANGE UP (ref 0.5–1.3)
EOSINOPHIL # BLD AUTO: 0 K/UL — SIGNIFICANT CHANGE UP (ref 0–0.5)
EOSINOPHIL NFR BLD AUTO: 0 % — SIGNIFICANT CHANGE UP (ref 0–6)
GLUCOSE SERPL-MCNC: 108 MG/DL — HIGH (ref 70–99)
HCT VFR BLD CALC: 22.5 % — LOW (ref 34.5–45)
HGB BLD-MCNC: 7.2 G/DL — LOW (ref 11.5–15.5)
INR BLD: 1.29 RATIO — HIGH (ref 0.88–1.16)
IRON SATN MFR SERPL: 199 UG/DL — HIGH (ref 40–150)
IRON SATN MFR SERPL: 82 % — HIGH (ref 15–50)
LDH SERPL L TO P-CCNC: 353 U/L — HIGH (ref 120–225)
LYMPHOCYTES # BLD AUTO: 1.69 K/UL — SIGNIFICANT CHANGE UP (ref 1–3.3)
LYMPHOCYTES # BLD AUTO: 32 % — SIGNIFICANT CHANGE UP (ref 13–44)
MCHC RBC-ENTMCNC: 27.9 PG — SIGNIFICANT CHANGE UP (ref 27–34)
MCHC RBC-ENTMCNC: 32 GM/DL — SIGNIFICANT CHANGE UP (ref 32–36)
MCV RBC AUTO: 87.2 FL — SIGNIFICANT CHANGE UP (ref 80–100)
MONOCYTES # BLD AUTO: 1.43 K/UL — HIGH (ref 0–0.9)
MONOCYTES NFR BLD AUTO: 27 % — HIGH (ref 2–14)
NEUTROPHILS # BLD AUTO: 2.12 K/UL — SIGNIFICANT CHANGE UP (ref 1.8–7.4)
NEUTROPHILS NFR BLD AUTO: 40 % — LOW (ref 43–77)
PLATELET # BLD AUTO: 70 K/UL — LOW (ref 150–400)
POTASSIUM SERPL-MCNC: 4.4 MMOL/L — SIGNIFICANT CHANGE UP (ref 3.5–5.3)
POTASSIUM SERPL-SCNC: 4.4 MMOL/L — SIGNIFICANT CHANGE UP (ref 3.5–5.3)
PROT SERPL-MCNC: 8.5 G/DL — HIGH (ref 6–8.3)
PROTHROM AB SERPL-ACNC: 14.4 SEC — HIGH (ref 10–12.9)
RBC # BLD: 2.58 M/UL — LOW (ref 3.8–5.2)
RBC # FLD: 16.4 % — HIGH (ref 10.3–14.5)
SODIUM SERPL-SCNC: 138 MMOL/L — SIGNIFICANT CHANGE UP (ref 135–145)
TIBC SERPL-MCNC: 243 UG/DL — LOW (ref 250–450)
TROPONIN I SERPL-MCNC: <0.015 NG/ML — SIGNIFICANT CHANGE UP (ref 0–0.04)
UIBC SERPL-MCNC: 44 UG/DL — LOW (ref 110–370)
WBC # BLD: 5.29 K/UL — SIGNIFICANT CHANGE UP (ref 3.8–10.5)
WBC # FLD AUTO: 5.29 K/UL — SIGNIFICANT CHANGE UP (ref 3.8–10.5)

## 2019-04-26 PROCEDURE — 99285 EMERGENCY DEPT VISIT HI MDM: CPT

## 2019-04-26 NOTE — ED PROVIDER NOTE - OBJECTIVE STATEMENT
74 yr old female with hx of HTN, anemia, Rheumatic heart disease, external hemorrhoids, fibroids (s/p hysterectomy) and chronic back pain presents to ed c/o lighthead and fatigue found to have h/h ~7.  pt similar low h/h likely from MDS.  no ac use, no hematuria, no hemoptysis 74 yr old female with hx of HTN, anemia, Rheumatic heart disease, external hemorrhoids, fibroids (s/p hysterectomy) and chronic back pain presents to ed c/o lighthead and fatigue found to have h/h ~7.  pt similar low h/h likely from MDS.  no ac use, no hematuria, no hemoptysis.

## 2019-04-26 NOTE — ED PROVIDER NOTE - PROGRESS NOTE DETAILS
Shafer: h/h 7.2, plt 70 unchanged.  pt asx.  1 unit prbc and then will dc- after speaking with Dr Moreno.  pending 1 unit  dx sx anemia due to MDS.  f/u with Dr Moreno.

## 2019-04-26 NOTE — ED PROVIDER NOTE - CLINICAL SUMMARY MEDICAL DECISION MAKING FREE TEXT BOX
74 yr old female with hx of HTN, anemia, Rheumatic heart disease, external hemorrhoids, fibroids (s/p hysterectomy) and chronic back pain presents to ed c/o lighthead and fatigue found to have h/h ~7.  pt similar low h/h likely from MDS.  no ac use, no hematuria, no hemoptysis.    anemia from MDS- Dr Moreno sent in for transfusion- labs. prbc

## 2019-04-27 VITALS
RESPIRATION RATE: 19 BRPM | HEART RATE: 81 BPM | TEMPERATURE: 99 F | SYSTOLIC BLOOD PRESSURE: 135 MMHG | OXYGEN SATURATION: 99 % | DIASTOLIC BLOOD PRESSURE: 77 MMHG

## 2019-04-27 LAB
APPEARANCE UR: CLEAR — SIGNIFICANT CHANGE UP
BILIRUB UR-MCNC: NEGATIVE — SIGNIFICANT CHANGE UP
COLOR SPEC: YELLOW — SIGNIFICANT CHANGE UP
DIFF PNL FLD: ABNORMAL
FOLATE SERPL-MCNC: >20 NG/ML — SIGNIFICANT CHANGE UP
GLUCOSE UR QL: NEGATIVE — SIGNIFICANT CHANGE UP
HAPTOGLOB SERPL-MCNC: <20 MG/DL — LOW (ref 34–200)
KETONES UR-MCNC: NEGATIVE — SIGNIFICANT CHANGE UP
LEUKOCYTE ESTERASE UR-ACNC: ABNORMAL
NITRITE UR-MCNC: NEGATIVE — SIGNIFICANT CHANGE UP
PH UR: 6 — SIGNIFICANT CHANGE UP (ref 5–8)
PROT UR-MCNC: 15
SP GR SPEC: 1.02 — SIGNIFICANT CHANGE UP (ref 1.01–1.02)
UROBILINOGEN FLD QL: 1
VIT B12 SERPL-MCNC: 1059 PG/ML — SIGNIFICANT CHANGE UP (ref 232–1245)

## 2019-04-27 PROCEDURE — 86901 BLOOD TYPING SEROLOGIC RH(D): CPT

## 2019-04-27 PROCEDURE — 82746 ASSAY OF FOLIC ACID SERUM: CPT

## 2019-04-27 PROCEDURE — 81001 URINALYSIS AUTO W/SCOPE: CPT

## 2019-04-27 PROCEDURE — 80053 COMPREHEN METABOLIC PANEL: CPT

## 2019-04-27 PROCEDURE — 86923 COMPATIBILITY TEST ELECTRIC: CPT

## 2019-04-27 PROCEDURE — 85027 COMPLETE CBC AUTOMATED: CPT

## 2019-04-27 PROCEDURE — 83540 ASSAY OF IRON: CPT

## 2019-04-27 PROCEDURE — 86900 BLOOD TYPING SEROLOGIC ABO: CPT

## 2019-04-27 PROCEDURE — 85610 PROTHROMBIN TIME: CPT

## 2019-04-27 PROCEDURE — 83615 LACTATE (LD) (LDH) ENZYME: CPT

## 2019-04-27 PROCEDURE — 83010 ASSAY OF HAPTOGLOBIN QUANT: CPT

## 2019-04-27 PROCEDURE — 82607 VITAMIN B-12: CPT

## 2019-04-27 PROCEDURE — 86850 RBC ANTIBODY SCREEN: CPT

## 2019-04-27 PROCEDURE — 84484 ASSAY OF TROPONIN QUANT: CPT

## 2019-04-27 PROCEDURE — 83550 IRON BINDING TEST: CPT

## 2019-04-27 PROCEDURE — 99285 EMERGENCY DEPT VISIT HI MDM: CPT | Mod: 25

## 2019-04-27 PROCEDURE — 87086 URINE CULTURE/COLONY COUNT: CPT

## 2019-04-27 PROCEDURE — 36415 COLL VENOUS BLD VENIPUNCTURE: CPT

## 2019-04-27 PROCEDURE — 36430 TRANSFUSION BLD/BLD COMPNT: CPT

## 2019-04-27 PROCEDURE — P9040: CPT

## 2019-04-27 PROCEDURE — 82248 BILIRUBIN DIRECT: CPT

## 2019-04-27 NOTE — ED ADULT NURSE REASSESSMENT NOTE - NS ED NURSE REASSESS COMMENT FT1
Blood transfusion completed at 2:58am, no transfusion reaction observed. Vitals done and entered into flowsheets. Pt discharged home accompanied by son. Pt ambulatory.

## 2019-04-28 LAB
CULTURE RESULTS: SIGNIFICANT CHANGE UP
SPECIMEN SOURCE: SIGNIFICANT CHANGE UP

## 2019-05-14 ENCOUNTER — EMERGENCY (EMERGENCY)
Facility: HOSPITAL | Age: 74
LOS: 1 days | Discharge: HOME | End: 2019-05-14
Attending: EMERGENCY MEDICINE
Payer: COMMERCIAL

## 2019-05-14 VITALS
SYSTOLIC BLOOD PRESSURE: 152 MMHG | DIASTOLIC BLOOD PRESSURE: 66 MMHG | TEMPERATURE: 98 F | OXYGEN SATURATION: 100 % | RESPIRATION RATE: 18 BRPM | HEART RATE: 95 BPM

## 2019-05-14 VITALS
HEART RATE: 100 BPM | DIASTOLIC BLOOD PRESSURE: 74 MMHG | RESPIRATION RATE: 16 BRPM | OXYGEN SATURATION: 98 % | TEMPERATURE: 98 F | SYSTOLIC BLOOD PRESSURE: 150 MMHG | WEIGHT: 195.11 LBS | HEIGHT: 62 IN

## 2019-05-14 DIAGNOSIS — Z90.710 ACQUIRED ABSENCE OF BOTH CERVIX AND UTERUS: Chronic | ICD-10-CM

## 2019-05-14 LAB
ALBUMIN SERPL ELPH-MCNC: 3.2 G/DL — LOW (ref 3.5–5)
ALP SERPL-CCNC: 50 U/L — SIGNIFICANT CHANGE UP (ref 40–120)
ALT FLD-CCNC: 20 U/L DA — SIGNIFICANT CHANGE UP (ref 10–60)
ANION GAP SERPL CALC-SCNC: 5 MMOL/L — SIGNIFICANT CHANGE UP (ref 5–17)
APTT BLD: 30.4 SEC — SIGNIFICANT CHANGE UP (ref 27.5–36.3)
AST SERPL-CCNC: 38 U/L — SIGNIFICANT CHANGE UP (ref 10–40)
BASOPHILS # BLD AUTO: 0 K/UL — SIGNIFICANT CHANGE UP (ref 0–0.2)
BASOPHILS NFR BLD AUTO: 0 % — SIGNIFICANT CHANGE UP (ref 0–2)
BILIRUB SERPL-MCNC: 1.1 MG/DL — SIGNIFICANT CHANGE UP (ref 0.2–1.2)
BUN SERPL-MCNC: 9 MG/DL — SIGNIFICANT CHANGE UP (ref 7–18)
CALCIUM SERPL-MCNC: 8.4 MG/DL — SIGNIFICANT CHANGE UP (ref 8.4–10.5)
CHLORIDE SERPL-SCNC: 105 MMOL/L — SIGNIFICANT CHANGE UP (ref 96–108)
CO2 SERPL-SCNC: 27 MMOL/L — SIGNIFICANT CHANGE UP (ref 22–31)
CREAT SERPL-MCNC: 0.75 MG/DL — SIGNIFICANT CHANGE UP (ref 0.5–1.3)
EOSINOPHIL # BLD AUTO: 0 K/UL — SIGNIFICANT CHANGE UP (ref 0–0.5)
EOSINOPHIL NFR BLD AUTO: 0 % — SIGNIFICANT CHANGE UP (ref 0–6)
GLUCOSE SERPL-MCNC: 110 MG/DL — HIGH (ref 70–99)
HCT VFR BLD CALC: 23.6 % — LOW (ref 34.5–45)
HGB BLD-MCNC: 7.5 G/DL — LOW (ref 11.5–15.5)
INR BLD: 1.24 RATIO — HIGH (ref 0.88–1.16)
LYMPHOCYTES # BLD AUTO: 1.02 K/UL — SIGNIFICANT CHANGE UP (ref 1–3.3)
LYMPHOCYTES # BLD AUTO: 21 % — SIGNIFICANT CHANGE UP (ref 13–44)
MCHC RBC-ENTMCNC: 27.8 PG — SIGNIFICANT CHANGE UP (ref 27–34)
MCHC RBC-ENTMCNC: 31.8 GM/DL — LOW (ref 32–36)
MCV RBC AUTO: 87.4 FL — SIGNIFICANT CHANGE UP (ref 80–100)
MONOCYTES # BLD AUTO: 1.12 K/UL — HIGH (ref 0–0.9)
MONOCYTES NFR BLD AUTO: 23 % — HIGH (ref 2–14)
NEUTROPHILS # BLD AUTO: 2.72 K/UL — SIGNIFICANT CHANGE UP (ref 1.8–7.4)
NEUTROPHILS NFR BLD AUTO: 55 % — SIGNIFICANT CHANGE UP (ref 43–77)
PLATELET # BLD AUTO: 37 K/UL — LOW (ref 150–400)
POTASSIUM SERPL-MCNC: 4.6 MMOL/L — SIGNIFICANT CHANGE UP (ref 3.5–5.3)
POTASSIUM SERPL-SCNC: 4.6 MMOL/L — SIGNIFICANT CHANGE UP (ref 3.5–5.3)
PROT SERPL-MCNC: 8.1 G/DL — SIGNIFICANT CHANGE UP (ref 6–8.3)
PROTHROM AB SERPL-ACNC: 13.8 SEC — HIGH (ref 10–12.9)
RBC # BLD: 2.7 M/UL — LOW (ref 3.8–5.2)
RBC # FLD: 15.9 % — HIGH (ref 10.3–14.5)
SODIUM SERPL-SCNC: 137 MMOL/L — SIGNIFICANT CHANGE UP (ref 135–145)
WBC # BLD: 4.86 K/UL — SIGNIFICANT CHANGE UP (ref 3.8–10.5)
WBC # FLD AUTO: 4.86 K/UL — SIGNIFICANT CHANGE UP (ref 3.8–10.5)

## 2019-05-14 PROCEDURE — P9040: CPT

## 2019-05-14 PROCEDURE — 82962 GLUCOSE BLOOD TEST: CPT

## 2019-05-14 PROCEDURE — 36430 TRANSFUSION BLD/BLD COMPNT: CPT

## 2019-05-14 PROCEDURE — 85730 THROMBOPLASTIN TIME PARTIAL: CPT

## 2019-05-14 PROCEDURE — 86901 BLOOD TYPING SEROLOGIC RH(D): CPT

## 2019-05-14 PROCEDURE — 86900 BLOOD TYPING SEROLOGIC ABO: CPT

## 2019-05-14 PROCEDURE — 93005 ELECTROCARDIOGRAM TRACING: CPT

## 2019-05-14 PROCEDURE — 36415 COLL VENOUS BLD VENIPUNCTURE: CPT

## 2019-05-14 PROCEDURE — 86850 RBC ANTIBODY SCREEN: CPT

## 2019-05-14 PROCEDURE — 80053 COMPREHEN METABOLIC PANEL: CPT

## 2019-05-14 PROCEDURE — 85610 PROTHROMBIN TIME: CPT

## 2019-05-14 PROCEDURE — 99285 EMERGENCY DEPT VISIT HI MDM: CPT

## 2019-05-14 PROCEDURE — 85027 COMPLETE CBC AUTOMATED: CPT

## 2019-05-14 PROCEDURE — 99285 EMERGENCY DEPT VISIT HI MDM: CPT | Mod: 25

## 2019-05-14 PROCEDURE — 86923 COMPATIBILITY TEST ELECTRIC: CPT

## 2019-05-14 NOTE — ED PROVIDER NOTE - CLINICAL SUMMARY MEDICAL DECISION MAKING FREE TEXT BOX
Pt with SOB and dizziness. Order labs and reassess. Left a message for pt's Hematologist as per pt request. Pt with SOB and dizziness. Order labs and reassess. Left a message for pt's Hematologist as per pt request. will transfuse 1 unit prbc given symptomatic anemia

## 2019-05-14 NOTE — ED PROVIDER NOTE - OBJECTIVE STATEMENT
75 y/o F with PMHx including but limited to MDS [diagnosed 2 month ago and been receiving frequent blood transfusions] and PSHx of Abdominal Hysterectomy c/o dizziness and SOB x today. Pt is a poor historian, but relates receiving a blood transfusion a few weeks ago and the most recent one last week when her Hemoglobin level was a 6. Pt denies vaginal bleeding or any other acute complaints. NKDA.

## 2019-05-14 NOTE — ED PROVIDER NOTE - PMH
Essential hypertension    Fibroids    H/O rheumatic heart disease Essential hypertension    Fibroids    H/O rheumatic heart disease    MDS (myelodysplastic syndrome)

## 2019-05-14 NOTE — ED ADULT NURSE NOTE - NSIMPLEMENTINTERV_GEN_ALL_ED
Implemented All Fall with Harm Risk Interventions:  [place] to call system. Call bell, personal items and telephone within reach. Instruct patient to call for assistance. Room bathroom lighting operational. Non-slip footwear when patient is off stretcher. Physically safe environment: no spills, clutter or unnecessary equipment. Stretcher in lowest position, wheels locked, appropriate side rails in place. Provide visual cue, wrist band, yellow gown, etc. Monitor gait and stability. Monitor for mental status changes and reorient to person, place, and time. Review medications for side effects contributing to fall risk. Reinforce activity limits and safety measures with patient and family. Provide visual clues: red socks.

## 2019-06-03 ENCOUNTER — EMERGENCY (EMERGENCY)
Facility: HOSPITAL | Age: 74
LOS: 1 days | Discharge: HOME | End: 2019-06-03
Attending: EMERGENCY MEDICINE
Payer: COMMERCIAL

## 2019-06-03 VITALS
SYSTOLIC BLOOD PRESSURE: 154 MMHG | HEART RATE: 65 BPM | OXYGEN SATURATION: 98 % | DIASTOLIC BLOOD PRESSURE: 77 MMHG | TEMPERATURE: 99 F | RESPIRATION RATE: 18 BRPM

## 2019-06-03 VITALS
DIASTOLIC BLOOD PRESSURE: 80 MMHG | TEMPERATURE: 99 F | HEIGHT: 62 IN | SYSTOLIC BLOOD PRESSURE: 169 MMHG | HEART RATE: 58 BPM | WEIGHT: 195.99 LBS | OXYGEN SATURATION: 99 % | RESPIRATION RATE: 17 BRPM

## 2019-06-03 DIAGNOSIS — Z90.710 ACQUIRED ABSENCE OF BOTH CERVIX AND UTERUS: Chronic | ICD-10-CM

## 2019-06-03 LAB
ALBUMIN SERPL ELPH-MCNC: 3.6 G/DL — SIGNIFICANT CHANGE UP (ref 3.5–5)
ALP SERPL-CCNC: 50 U/L — SIGNIFICANT CHANGE UP (ref 40–120)
ALT FLD-CCNC: 28 U/L DA — SIGNIFICANT CHANGE UP (ref 10–60)
ANION GAP SERPL CALC-SCNC: 9 MMOL/L — SIGNIFICANT CHANGE UP (ref 5–17)
APTT BLD: 25.9 SEC — LOW (ref 27.5–36.3)
AST SERPL-CCNC: 21 U/L — SIGNIFICANT CHANGE UP (ref 10–40)
BASOPHILS # BLD AUTO: 0 K/UL — SIGNIFICANT CHANGE UP (ref 0–0.2)
BASOPHILS NFR BLD AUTO: 0 % — SIGNIFICANT CHANGE UP (ref 0–2)
BILIRUB SERPL-MCNC: 1.6 MG/DL — HIGH (ref 0.2–1.2)
BUN SERPL-MCNC: 12 MG/DL — SIGNIFICANT CHANGE UP (ref 7–18)
CALCIUM SERPL-MCNC: 8.8 MG/DL — SIGNIFICANT CHANGE UP (ref 8.4–10.5)
CHLORIDE SERPL-SCNC: 103 MMOL/L — SIGNIFICANT CHANGE UP (ref 96–108)
CK SERPL-CCNC: 31 U/L — SIGNIFICANT CHANGE UP (ref 21–215)
CK SERPL-CCNC: 38 U/L — SIGNIFICANT CHANGE UP (ref 21–215)
CO2 SERPL-SCNC: 27 MMOL/L — SIGNIFICANT CHANGE UP (ref 22–31)
CREAT SERPL-MCNC: 0.8 MG/DL — SIGNIFICANT CHANGE UP (ref 0.5–1.3)
EOSINOPHIL # BLD AUTO: 0 K/UL — SIGNIFICANT CHANGE UP (ref 0–0.5)
EOSINOPHIL NFR BLD AUTO: 0 % — SIGNIFICANT CHANGE UP (ref 0–6)
GLUCOSE SERPL-MCNC: 139 MG/DL — HIGH (ref 70–99)
HCT VFR BLD CALC: 30.9 % — LOW (ref 34.5–45)
HGB BLD-MCNC: 9.6 G/DL — LOW (ref 11.5–15.5)
INR BLD: 1.14 RATIO — SIGNIFICANT CHANGE UP (ref 0.88–1.16)
LYMPHOCYTES # BLD AUTO: 1.26 K/UL — SIGNIFICANT CHANGE UP (ref 1–3.3)
LYMPHOCYTES # BLD AUTO: 12 % — LOW (ref 13–44)
MCHC RBC-ENTMCNC: 28.8 PG — SIGNIFICANT CHANGE UP (ref 27–34)
MCHC RBC-ENTMCNC: 31.1 GM/DL — LOW (ref 32–36)
MCV RBC AUTO: 92.8 FL — SIGNIFICANT CHANGE UP (ref 80–100)
MONOCYTES # BLD AUTO: 1.67 K/UL — HIGH (ref 0–0.9)
MONOCYTES NFR BLD AUTO: 16 % — HIGH (ref 2–14)
NEUTROPHILS # BLD AUTO: 7.32 K/UL — SIGNIFICANT CHANGE UP (ref 1.8–7.4)
NEUTROPHILS NFR BLD AUTO: 70 % — SIGNIFICANT CHANGE UP (ref 43–77)
PLATELET # BLD AUTO: 90 K/UL — LOW (ref 150–400)
POTASSIUM SERPL-MCNC: 3.9 MMOL/L — SIGNIFICANT CHANGE UP (ref 3.5–5.3)
POTASSIUM SERPL-SCNC: 3.9 MMOL/L — SIGNIFICANT CHANGE UP (ref 3.5–5.3)
PROT SERPL-MCNC: 7.8 G/DL — SIGNIFICANT CHANGE UP (ref 6–8.3)
PROTHROM AB SERPL-ACNC: 12.7 SEC — SIGNIFICANT CHANGE UP (ref 10–12.9)
RBC # BLD: 3.33 M/UL — LOW (ref 3.8–5.2)
RBC # FLD: 19.5 % — HIGH (ref 10.3–14.5)
SODIUM SERPL-SCNC: 139 MMOL/L — SIGNIFICANT CHANGE UP (ref 135–145)
T3 SERPL-MCNC: 74 NG/DL — LOW (ref 80–200)
T4 AB SER-ACNC: 7.7 UG/DL — SIGNIFICANT CHANGE UP (ref 4.6–12)
TROPONIN I SERPL-MCNC: 0.02 NG/ML — SIGNIFICANT CHANGE UP (ref 0–0.04)
TROPONIN I SERPL-MCNC: 0.02 NG/ML — SIGNIFICANT CHANGE UP (ref 0–0.04)
TSH SERPL-MCNC: 0.95 UU/ML — SIGNIFICANT CHANGE UP (ref 0.34–4.82)
WBC # BLD: 10.46 K/UL — SIGNIFICANT CHANGE UP (ref 3.8–10.5)
WBC # FLD AUTO: 10.46 K/UL — SIGNIFICANT CHANGE UP (ref 3.8–10.5)

## 2019-06-03 PROCEDURE — 71046 X-RAY EXAM CHEST 2 VIEWS: CPT

## 2019-06-03 PROCEDURE — 85610 PROTHROMBIN TIME: CPT

## 2019-06-03 PROCEDURE — 93005 ELECTROCARDIOGRAM TRACING: CPT

## 2019-06-03 PROCEDURE — 84484 ASSAY OF TROPONIN QUANT: CPT

## 2019-06-03 PROCEDURE — 71046 X-RAY EXAM CHEST 2 VIEWS: CPT | Mod: 26

## 2019-06-03 PROCEDURE — 85730 THROMBOPLASTIN TIME PARTIAL: CPT

## 2019-06-03 PROCEDURE — 84443 ASSAY THYROID STIM HORMONE: CPT

## 2019-06-03 PROCEDURE — 74176 CT ABD & PELVIS W/O CONTRAST: CPT | Mod: 26

## 2019-06-03 PROCEDURE — 36415 COLL VENOUS BLD VENIPUNCTURE: CPT

## 2019-06-03 PROCEDURE — 84436 ASSAY OF TOTAL THYROXINE: CPT

## 2019-06-03 PROCEDURE — 80053 COMPREHEN METABOLIC PANEL: CPT

## 2019-06-03 PROCEDURE — 74176 CT ABD & PELVIS W/O CONTRAST: CPT

## 2019-06-03 PROCEDURE — 82550 ASSAY OF CK (CPK): CPT

## 2019-06-03 PROCEDURE — 99284 EMERGENCY DEPT VISIT MOD MDM: CPT | Mod: 25

## 2019-06-03 PROCEDURE — 82962 GLUCOSE BLOOD TEST: CPT

## 2019-06-03 PROCEDURE — 84480 ASSAY TRIIODOTHYRONINE (T3): CPT

## 2019-06-03 PROCEDURE — 85027 COMPLETE CBC AUTOMATED: CPT

## 2019-06-03 PROCEDURE — 99285 EMERGENCY DEPT VISIT HI MDM: CPT

## 2019-06-03 RX ORDER — DOCUSATE SODIUM 100 MG
1 CAPSULE ORAL
Qty: 30 | Refills: 0
Start: 2019-06-03

## 2019-06-03 NOTE — ED PROVIDER NOTE - MUSCULOSKELETAL, MLM
Spine appears normal, range of motion is not limited, no muscle or joint tenderness. No calf swelling, no edema.

## 2019-06-03 NOTE — ED PROVIDER NOTE - PROGRESS NOTE DETAILS
feeling well. ambulatory with steady gait. vitals O2 sat and HR normal after ambulation. plan for 2nd troponin and xray. if normal and feeling well, plan to discharge with primary care physician followup 2nd trop negative.  pt reassessed, and feels well.  Abd CT shows stool, otherwise unremarkable.  pt given copy of all results.  Will d/c

## 2019-06-03 NOTE — ED PROVIDER NOTE - OBJECTIVE STATEMENT
73 y/o female with a PMHx of recent leukemia diagnosis, HTN presents to the ED c/o rapid heart beat and blurry vision today. Episode lasted for about 30 minutes. Denies chest pain now or with episode earlier today. Symptoms have now resolved. 75 y/o female with a PMHx of recent leukemia diagnosis, HTN presents to the ED c/o rapid heart beat and slightly blurry vision today. Episode lasted for about 30 minutes. Denies chest pain now or with episode earlier today. Symptoms have now resolved. no SOB

## 2019-06-26 ENCOUNTER — EMERGENCY (EMERGENCY)
Facility: HOSPITAL | Age: 74
LOS: 1 days | Discharge: HOME | End: 2019-06-26
Attending: EMERGENCY MEDICINE
Payer: COMMERCIAL

## 2019-06-26 VITALS
HEART RATE: 72 BPM | RESPIRATION RATE: 16 BRPM | WEIGHT: 199.08 LBS | TEMPERATURE: 98 F | OXYGEN SATURATION: 98 % | SYSTOLIC BLOOD PRESSURE: 163 MMHG | HEIGHT: 62 IN | DIASTOLIC BLOOD PRESSURE: 90 MMHG

## 2019-06-26 VITALS
TEMPERATURE: 98 F | SYSTOLIC BLOOD PRESSURE: 154 MMHG | OXYGEN SATURATION: 99 % | HEART RATE: 80 BPM | RESPIRATION RATE: 16 BRPM | DIASTOLIC BLOOD PRESSURE: 75 MMHG

## 2019-06-26 DIAGNOSIS — Z90.710 ACQUIRED ABSENCE OF BOTH CERVIX AND UTERUS: Chronic | ICD-10-CM

## 2019-06-26 LAB
ALBUMIN SERPL ELPH-MCNC: 3.9 G/DL — SIGNIFICANT CHANGE UP (ref 3.5–5)
ALP SERPL-CCNC: 40 U/L — SIGNIFICANT CHANGE UP (ref 40–120)
ALT FLD-CCNC: 20 U/L DA — SIGNIFICANT CHANGE UP (ref 10–60)
ANION GAP SERPL CALC-SCNC: 9 MMOL/L — SIGNIFICANT CHANGE UP (ref 5–17)
AST SERPL-CCNC: 23 U/L — SIGNIFICANT CHANGE UP (ref 10–40)
BASOPHILS # BLD AUTO: 0 K/UL — SIGNIFICANT CHANGE UP (ref 0–0.2)
BASOPHILS NFR BLD AUTO: 0 % — SIGNIFICANT CHANGE UP (ref 0–2)
BILIRUB SERPL-MCNC: 1.6 MG/DL — HIGH (ref 0.2–1.2)
BUN SERPL-MCNC: 16 MG/DL — SIGNIFICANT CHANGE UP (ref 7–18)
CALCIUM SERPL-MCNC: 9 MG/DL — SIGNIFICANT CHANGE UP (ref 8.4–10.5)
CHLORIDE SERPL-SCNC: 107 MMOL/L — SIGNIFICANT CHANGE UP (ref 96–108)
CO2 SERPL-SCNC: 27 MMOL/L — SIGNIFICANT CHANGE UP (ref 22–31)
CREAT SERPL-MCNC: 0.9 MG/DL — SIGNIFICANT CHANGE UP (ref 0.5–1.3)
EOSINOPHIL # BLD AUTO: 0 K/UL — SIGNIFICANT CHANGE UP (ref 0–0.5)
EOSINOPHIL NFR BLD AUTO: 0 % — SIGNIFICANT CHANGE UP (ref 0–6)
GLUCOSE SERPL-MCNC: 120 MG/DL — HIGH (ref 70–99)
HCT VFR BLD CALC: 34.3 % — LOW (ref 34.5–45)
HGB BLD-MCNC: 11 G/DL — LOW (ref 11.5–15.5)
LIDOCAIN IGE QN: 115 U/L — SIGNIFICANT CHANGE UP (ref 73–393)
LYMPHOCYTES # BLD AUTO: 0.13 K/UL — LOW (ref 1–3.3)
LYMPHOCYTES # BLD AUTO: 1 % — LOW (ref 13–44)
MCHC RBC-ENTMCNC: 30.5 PG — SIGNIFICANT CHANGE UP (ref 27–34)
MCHC RBC-ENTMCNC: 32.1 GM/DL — SIGNIFICANT CHANGE UP (ref 32–36)
MCV RBC AUTO: 95 FL — SIGNIFICANT CHANGE UP (ref 80–100)
MONOCYTES # BLD AUTO: 1.51 K/UL — HIGH (ref 0–0.9)
MONOCYTES NFR BLD AUTO: 12 % — SIGNIFICANT CHANGE UP (ref 2–14)
NEUTROPHILS # BLD AUTO: 9.55 K/UL — HIGH (ref 1.8–7.4)
NEUTROPHILS NFR BLD AUTO: 75 % — SIGNIFICANT CHANGE UP (ref 43–77)
PLATELET # BLD AUTO: 139 K/UL — LOW (ref 150–400)
POTASSIUM SERPL-MCNC: 4.1 MMOL/L — SIGNIFICANT CHANGE UP (ref 3.5–5.3)
POTASSIUM SERPL-SCNC: 4.1 MMOL/L — SIGNIFICANT CHANGE UP (ref 3.5–5.3)
PROT SERPL-MCNC: 8.2 G/DL — SIGNIFICANT CHANGE UP (ref 6–8.3)
RBC # BLD: 3.61 M/UL — LOW (ref 3.8–5.2)
RBC # FLD: 20.1 % — HIGH (ref 10.3–14.5)
SODIUM SERPL-SCNC: 143 MMOL/L — SIGNIFICANT CHANGE UP (ref 135–145)
WBC # BLD: 12.56 K/UL — HIGH (ref 3.8–10.5)
WBC # FLD AUTO: 12.56 K/UL — HIGH (ref 3.8–10.5)

## 2019-06-26 PROCEDURE — 36415 COLL VENOUS BLD VENIPUNCTURE: CPT

## 2019-06-26 PROCEDURE — 99283 EMERGENCY DEPT VISIT LOW MDM: CPT

## 2019-06-26 PROCEDURE — 85027 COMPLETE CBC AUTOMATED: CPT

## 2019-06-26 PROCEDURE — 83690 ASSAY OF LIPASE: CPT

## 2019-06-26 PROCEDURE — 80053 COMPREHEN METABOLIC PANEL: CPT

## 2019-06-26 RX ORDER — ONDANSETRON 8 MG/1
4 TABLET, FILM COATED ORAL ONCE
Refills: 0 | Status: COMPLETED | OUTPATIENT
Start: 2019-06-26 | End: 2019-06-26

## 2019-06-26 RX ORDER — SODIUM CHLORIDE 9 MG/ML
1000 INJECTION INTRAMUSCULAR; INTRAVENOUS; SUBCUTANEOUS ONCE
Refills: 0 | Status: COMPLETED | OUTPATIENT
Start: 2019-06-26 | End: 2019-06-26

## 2019-06-26 RX ADMIN — ONDANSETRON 4 MILLIGRAM(S): 8 TABLET, FILM COATED ORAL at 19:45

## 2019-06-26 RX ADMIN — SODIUM CHLORIDE 1000 MILLILITER(S): 9 INJECTION INTRAMUSCULAR; INTRAVENOUS; SUBCUTANEOUS at 19:45

## 2019-06-26 NOTE — ED PROVIDER NOTE - PMH
Anemia    Essential hypertension    Fibroids    H/O rheumatic heart disease    MDS (myelodysplastic syndrome)

## 2019-06-26 NOTE — ED PROVIDER NOTE - OBJECTIVE STATEMENT
75 y/o female with PMHx of HTN presents to the ED c/o vomiting/diarrhea x today. Pt notes she received 3 injections by her hematologist x today for anemia. Pt notes upon coming back home, she had 14 episodes of vomiting NBNB and diarrhea (non-bloody) each. Pt notes her hematologist told her she might experience these side effects. Last emesis x 6PM today. Pt denies fever, chills, or any other complaints. NKDA.

## 2019-06-26 NOTE — ED ADULT NURSE NOTE - NSIMPLEMENTINTERV_GEN_ALL_ED
Implemented All Universal Safety Interventions:  [place] to call system. Call bell, personal items and telephone within reach. Instruct patient to call for assistance. Room bathroom lighting operational. Non-slip footwear when patient is off stretcher. Physically safe environment: no spills, clutter or unnecessary equipment. Stretcher in lowest position, wheels locked, appropriate side rails in place.

## 2019-08-08 ENCOUNTER — INPATIENT (INPATIENT)
Facility: HOSPITAL | Age: 74
LOS: 0 days | Discharge: HOME | DRG: 812 | End: 2019-08-09
Attending: INTERNAL MEDICINE | Admitting: INTERNAL MEDICINE
Payer: COMMERCIAL

## 2019-08-08 VITALS
HEART RATE: 83 BPM | DIASTOLIC BLOOD PRESSURE: 84 MMHG | RESPIRATION RATE: 20 BRPM | WEIGHT: 190.04 LBS | SYSTOLIC BLOOD PRESSURE: 139 MMHG | OXYGEN SATURATION: 100 % | HEIGHT: 62 IN | TEMPERATURE: 99 F

## 2019-08-08 DIAGNOSIS — Z90.710 ACQUIRED ABSENCE OF BOTH CERVIX AND UTERUS: Chronic | ICD-10-CM

## 2019-08-08 DIAGNOSIS — D46.9 MYELODYSPLASTIC SYNDROME, UNSPECIFIED: ICD-10-CM

## 2019-08-08 DIAGNOSIS — R06.02 SHORTNESS OF BREATH: ICD-10-CM

## 2019-08-08 DIAGNOSIS — D61.818 OTHER PANCYTOPENIA: ICD-10-CM

## 2019-08-08 DIAGNOSIS — I10 ESSENTIAL (PRIMARY) HYPERTENSION: ICD-10-CM

## 2019-08-08 DIAGNOSIS — Z29.9 ENCOUNTER FOR PROPHYLACTIC MEASURES, UNSPECIFIED: ICD-10-CM

## 2019-08-08 DIAGNOSIS — D64.9 ANEMIA, UNSPECIFIED: ICD-10-CM

## 2019-08-08 LAB
ALBUMIN SERPL ELPH-MCNC: 3.4 G/DL — LOW (ref 3.5–5)
ALP SERPL-CCNC: 44 U/L — SIGNIFICANT CHANGE UP (ref 40–120)
ALT FLD-CCNC: 20 U/L DA — SIGNIFICANT CHANGE UP (ref 10–60)
ANION GAP SERPL CALC-SCNC: 8 MMOL/L — SIGNIFICANT CHANGE UP (ref 5–17)
ANISOCYTOSIS BLD QL: SLIGHT — SIGNIFICANT CHANGE UP
APTT BLD: 25.6 SEC — LOW (ref 27.5–36.3)
AST SERPL-CCNC: 22 U/L — SIGNIFICANT CHANGE UP (ref 10–40)
BASOPHILS # BLD AUTO: 0 K/UL — SIGNIFICANT CHANGE UP (ref 0–0.2)
BASOPHILS NFR BLD AUTO: 0 % — SIGNIFICANT CHANGE UP (ref 0–2)
BILIRUB SERPL-MCNC: 2.2 MG/DL — HIGH (ref 0.2–1.2)
BLD GP AB SCN SERPL QL: SIGNIFICANT CHANGE UP
BUN SERPL-MCNC: 10 MG/DL — SIGNIFICANT CHANGE UP (ref 7–18)
CALCIUM SERPL-MCNC: 9 MG/DL — SIGNIFICANT CHANGE UP (ref 8.4–10.5)
CHLORIDE SERPL-SCNC: 103 MMOL/L — SIGNIFICANT CHANGE UP (ref 96–108)
CK MB BLD-MCNC: 7.9 % — HIGH (ref 0–3.5)
CK MB CFR SERPL CALC: 4.1 NG/ML — HIGH (ref 0–3.6)
CK MB CFR SERPL CALC: 4.7 NG/ML — HIGH (ref 0–3.6)
CK SERPL-CCNC: 52 U/L — SIGNIFICANT CHANGE UP (ref 21–215)
CO2 SERPL-SCNC: 28 MMOL/L — SIGNIFICANT CHANGE UP (ref 22–31)
CREAT SERPL-MCNC: 0.88 MG/DL — SIGNIFICANT CHANGE UP (ref 0.5–1.3)
D DIMER BLD IA.RAPID-MCNC: 430 NG/ML DDU — HIGH
EOSINOPHIL # BLD AUTO: 0.05 K/UL — SIGNIFICANT CHANGE UP (ref 0–0.5)
EOSINOPHIL NFR BLD AUTO: 1 % — SIGNIFICANT CHANGE UP (ref 0–6)
GLUCOSE SERPL-MCNC: 180 MG/DL — HIGH (ref 70–99)
HCT VFR BLD CALC: 19.9 % — CRITICAL LOW (ref 34.5–45)
HCT VFR BLD CALC: 23.8 % — LOW (ref 34.5–45)
HGB BLD-MCNC: 6.3 G/DL — CRITICAL LOW (ref 11.5–15.5)
HGB BLD-MCNC: 7.9 G/DL — LOW (ref 11.5–15.5)
INR BLD: 1.21 RATIO — HIGH (ref 0.88–1.16)
LYMPHOCYTES # BLD AUTO: 1.6 K/UL — SIGNIFICANT CHANGE UP (ref 1–3.3)
LYMPHOCYTES # BLD AUTO: 33 % — SIGNIFICANT CHANGE UP (ref 13–44)
MAGNESIUM SERPL-MCNC: 2.1 MG/DL — SIGNIFICANT CHANGE UP (ref 1.6–2.6)
MANUAL SMEAR VERIFICATION: SIGNIFICANT CHANGE UP
MCHC RBC-ENTMCNC: 31 PG — SIGNIFICANT CHANGE UP (ref 27–34)
MCHC RBC-ENTMCNC: 31.1 PG — SIGNIFICANT CHANGE UP (ref 27–34)
MCHC RBC-ENTMCNC: 31.7 GM/DL — LOW (ref 32–36)
MCHC RBC-ENTMCNC: 33.2 GM/DL — SIGNIFICANT CHANGE UP (ref 32–36)
MCV RBC AUTO: 93.7 FL — SIGNIFICANT CHANGE UP (ref 80–100)
MCV RBC AUTO: 98 FL — SIGNIFICANT CHANGE UP (ref 80–100)
MICROCYTES BLD QL: SIGNIFICANT CHANGE UP
MONOCYTES # BLD AUTO: 0.24 K/UL — SIGNIFICANT CHANGE UP (ref 0–0.9)
MONOCYTES NFR BLD AUTO: 5 % — SIGNIFICANT CHANGE UP (ref 2–14)
NEUTROPHILS # BLD AUTO: 2.9 K/UL — SIGNIFICANT CHANGE UP (ref 1.8–7.4)
NEUTROPHILS NFR BLD AUTO: 56 % — SIGNIFICANT CHANGE UP (ref 43–77)
NEUTS BAND # BLD: 4 % — SIGNIFICANT CHANGE UP (ref 0–8)
NRBC # BLD: 25 /100 WBCS — HIGH (ref 0–0)
NRBC # BLD: 4 /100 — HIGH (ref 0–0)
NT-PROBNP SERPL-SCNC: 1230 PG/ML — HIGH (ref 0–450)
OB PNL STL: NEGATIVE — SIGNIFICANT CHANGE UP
OVALOCYTES BLD QL SMEAR: SLIGHT — SIGNIFICANT CHANGE UP
PLAT MORPH BLD: NORMAL — SIGNIFICANT CHANGE UP
PLATELET # BLD AUTO: 18 K/UL — CRITICAL LOW (ref 150–400)
PLATELET # BLD AUTO: 89 K/UL — LOW (ref 150–400)
PLATELET COUNT - ESTIMATE: ABNORMAL
POIKILOCYTOSIS BLD QL AUTO: SLIGHT — SIGNIFICANT CHANGE UP
POLYCHROMASIA BLD QL SMEAR: SLIGHT — SIGNIFICANT CHANGE UP
POTASSIUM SERPL-MCNC: 4.2 MMOL/L — SIGNIFICANT CHANGE UP (ref 3.5–5.3)
POTASSIUM SERPL-SCNC: 4.2 MMOL/L — SIGNIFICANT CHANGE UP (ref 3.5–5.3)
PROT SERPL-MCNC: 6.9 G/DL — SIGNIFICANT CHANGE UP (ref 6–8.3)
PROTHROM AB SERPL-ACNC: 13.5 SEC — HIGH (ref 10–12.9)
RBC # BLD: 2.03 M/UL — LOW (ref 3.8–5.2)
RBC # BLD: 2.54 M/UL — LOW (ref 3.8–5.2)
RBC # FLD: 24.5 % — HIGH (ref 10.3–14.5)
RBC # FLD: 27.8 % — HIGH (ref 10.3–14.5)
RBC BLD AUTO: ABNORMAL
SODIUM SERPL-SCNC: 139 MMOL/L — SIGNIFICANT CHANGE UP (ref 135–145)
SPHEROCYTES BLD QL SMEAR: SLIGHT — SIGNIFICANT CHANGE UP
TROPONIN I SERPL-MCNC: 0.26 NG/ML — HIGH (ref 0–0.04)
TROPONIN I SERPL-MCNC: 0.33 NG/ML — HIGH (ref 0–0.04)
TSH SERPL-MCNC: 1.09 UU/ML — SIGNIFICANT CHANGE UP (ref 0.34–4.82)
VARIANT LYMPHS # BLD: 1 % — SIGNIFICANT CHANGE UP (ref 0–6)
WBC # BLD: 4.68 K/UL — SIGNIFICANT CHANGE UP (ref 3.8–10.5)
WBC # BLD: 4.84 K/UL — SIGNIFICANT CHANGE UP (ref 3.8–10.5)
WBC # FLD AUTO: 4.68 K/UL — SIGNIFICANT CHANGE UP (ref 3.8–10.5)
WBC # FLD AUTO: 4.84 K/UL — SIGNIFICANT CHANGE UP (ref 3.8–10.5)

## 2019-08-08 PROCEDURE — 93010 ELECTROCARDIOGRAM REPORT: CPT | Mod: 76

## 2019-08-08 PROCEDURE — 71045 X-RAY EXAM CHEST 1 VIEW: CPT | Mod: 26

## 2019-08-08 PROCEDURE — 99285 EMERGENCY DEPT VISIT HI MDM: CPT

## 2019-08-08 RX ORDER — FERROUS SULFATE 325(65) MG
325 TABLET ORAL DAILY
Refills: 0 | Status: DISCONTINUED | OUTPATIENT
Start: 2019-08-08 | End: 2019-08-09

## 2019-08-08 RX ORDER — PANTOPRAZOLE SODIUM 20 MG/1
40 TABLET, DELAYED RELEASE ORAL ONCE
Refills: 0 | Status: COMPLETED | OUTPATIENT
Start: 2019-08-08 | End: 2019-08-08

## 2019-08-08 RX ORDER — BACLOFEN 100 %
0 POWDER (GRAM) MISCELLANEOUS
Qty: 30 | Refills: 0 | DISCHARGE

## 2019-08-08 RX ORDER — GABAPENTIN 400 MG/1
300 CAPSULE ORAL THREE TIMES A DAY
Refills: 0 | Status: DISCONTINUED | OUTPATIENT
Start: 2019-08-08 | End: 2019-08-09

## 2019-08-08 RX ORDER — PANTOPRAZOLE SODIUM 20 MG/1
40 TABLET, DELAYED RELEASE ORAL
Refills: 0 | Status: DISCONTINUED | OUTPATIENT
Start: 2019-08-08 | End: 2019-08-09

## 2019-08-08 RX ORDER — METOPROLOL TARTRATE 50 MG
50 TABLET ORAL
Refills: 0 | Status: DISCONTINUED | OUTPATIENT
Start: 2019-08-08 | End: 2019-08-08

## 2019-08-08 RX ORDER — FOLIC ACID 0.8 MG
1 TABLET ORAL DAILY
Refills: 0 | Status: DISCONTINUED | OUTPATIENT
Start: 2019-08-08 | End: 2019-08-09

## 2019-08-08 RX ORDER — AMLODIPINE BESYLATE 2.5 MG/1
5 TABLET ORAL DAILY
Refills: 0 | Status: DISCONTINUED | OUTPATIENT
Start: 2019-08-08 | End: 2019-08-08

## 2019-08-08 RX ADMIN — GABAPENTIN 300 MILLIGRAM(S): 400 CAPSULE ORAL at 21:41

## 2019-08-08 RX ADMIN — PANTOPRAZOLE SODIUM 40 MILLIGRAM(S): 20 TABLET, DELAYED RELEASE ORAL at 17:21

## 2019-08-08 NOTE — CHART NOTE - NSCHARTNOTEFT_GEN_A_CORE
Patient seen and examined with Dr Villanueva PGY-1. 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 Patient is found to be anemia at 6.3 g/dl and thrombocytopenic to 18K/uL. Follows up out patient with [facility] care physicians but doesn't know her chemotherapy regime. Medication list confirmed from Pharmacy.     Plan:  Anemia:  Transfuse 2 prbc and 1 platelet  -2 dark stools in past 2 three days However, was on iron pills  -Not tachycardic, occult is negative  -f/u post transfusion CBC  -DD: 2/2 to chemotherapy induced, PUD or leukemia.   -monitor CBC daily     Thrombocytopenia:  could be chemotherapy induced,  denies fevers chills,  -will transfuse 1 platelet  -No signs of active bleeding noted at this point  -Hematology consult Dr Moreno

## 2019-08-08 NOTE — ED ADULT NURSE NOTE - OBJECTIVE STATEMENT
Pt present to ED alert oriented x3. breathing unlabored room air, c/o dizziness and palpitation. Denies any pain. non diaphoretic. speak clear and full sentences. EKG, labs, x-ray done.

## 2019-08-08 NOTE — H&P ADULT - PROBLEM SELECTOR PLAN 1
-pt had an episode of SOB and palpitations  -Hb 6.3 on admission, platelet count 18  -Can be multifactorial    2 episodes of black stools (can be due to iron pills she takes at home)    H/o MDS (on chemotherapy)- pt does not know the drugs she takes; Primary  team to confirm her chemotherapy regimen with the [facility]   -take iron 325 mg and folic acid 1 mg at home  -FOBT negative  - Will be giving 2 units PRBC and 1unit platelets  -Follow post transfusion CBC at 10 pm

## 2019-08-08 NOTE — ED PROVIDER NOTE - CLINICAL SUMMARY MEDICAL DECISION MAKING FREE TEXT BOX
sob. rule out pe rule out chf. sob. rule out pe rule out chf. ddimer elevated but cbc shows anemia, which is more likely the etiology of her symptoms. given the elevated trop, she needs to be transfused up to 9 with signs of decreased perfusion. will transfuse 2 units and blood. dr mora from hematology will see patient.

## 2019-08-08 NOTE — H&P ADULT - NEGATIVE NEUROLOGICAL SYMPTOMS
no difficulty walking/no tremors/no paresthesias/no vertigo/no loss of sensation/no confusion/no weakness

## 2019-08-08 NOTE — ED ADULT NURSE NOTE - ED STAT RN HANDOFF DETAILS 2
Pt endorsed to JAMIN RUIZ on [address]. Pt left the unit on cardiac monitor with no signs of distress. 1unit of PRBCs in progress.

## 2019-08-08 NOTE — CONSULT NOTE ADULT - ASSESSMENT
74 year old lady with advanced MDS on chemotherapy presented with dizziness and palpitation.  Lab showed pancytopenia witH Hb 6.3 AND PLATELET 18.  ?gib

## 2019-08-08 NOTE — H&P ADULT - ASSESSMENT
74 y old F from home with PMH of HTN, palpitations and Myelodysplastic syndrome(on chemotherapy) presented to ED with palpitations and SOB that started this morning and lasted for 40 mins.  Labs significant for Hb -6.3, platelet count- 18, D-dimer 430, total bilirubin- 2.2, T3- 0.259, Pro-BNP 1230  CXR clear. EKG    In the ED, pt will be transfused 2 units PRBC and 1 unit of platelets.  Pt will be admitted to tele to r/o ACS    Primary team to confirm her chemotherapy regimen with the [facility] 74 y old F from home with PMH of HTN, palpitations and Myelodysplastic syndrome(on chemotherapy) presented to ED with palpitations and SOB that started this morning and lasted for 40 mins.  Labs significant for Hb -6.3, platelet count- 18, D-dimer 430, total bilirubin- 2.2, T3- 0.259, Pro-BNP 1230  CXR clear. EKG NSR, no STTW changes    In the ED, pt will be transfused 2 units PRBC and 1 unit of platelets.  Pt will be admitted to tele to r/o ACS    Primary team to confirm her chemotherapy regimen with the [facility]

## 2019-08-08 NOTE — ED ADULT NURSE NOTE - NSIMPLEMENTINTERV_GEN_ALL_ED
Implemented All Fall Risk Interventions:  [place] to call system. Call bell, personal items and telephone within reach. Instruct patient to call for assistance. Room bathroom lighting operational. Non-slip footwear when patient is off stretcher. Physically safe environment: no spills, clutter or unnecessary equipment. Stretcher in lowest position, wheels locked, appropriate side rails in place. Provide visual cue, wrist band, yellow gown, etc. Monitor gait and stability. Monitor for mental status changes and reorient to person, place, and time. Review medications for side effects contributing to fall risk. Reinforce activity limits and safety measures with patient and family.

## 2019-08-08 NOTE — H&P ADULT - PROBLEM SELECTOR PLAN 4
-pt has h/o MDS  -on chemotherapy; Primary team to confirm her chemotherapy regimen with the [facility]

## 2019-08-08 NOTE — H&P ADULT - NSICDXPASTMEDICALHX_GEN_ALL_CORE_FT
PAST MEDICAL HISTORY:  Anemia     Essential hypertension     Fibroids     H/O rheumatic heart disease     MDS (myelodysplastic syndrome)

## 2019-08-08 NOTE — H&P ADULT - PROBLEM SELECTOR PLAN 2
- Troponins 0.024---->0.019---->0.259  -pt ahd palpitaions and SOB; no chest pain  -CXR normal  -EKG:   -f/u troponin at 10 pm  -on telemetry - Troponins 0.024---->0.019---->0.259  -pt ahd palpitaions and SOB; no chest pain  -CXR normal  -EKG: NSR, no STTW changes    -f/u troponin at 10 pm  -on telemetry

## 2019-08-08 NOTE — H&P ADULT - PROBLEM SELECTOR PLAN 5
IMPROVE VTE Individual Risk Assessment  RISK                                                                Points  [  ] Previous VTE                                                  3  [  ] Thrombophilia                                               2  [  ] Lower limb paralysis                                      2        (unable to hold up >15 seconds)    [ x] Current Cancer                                              2         (within 6 months)  [  ] Immobilization > 24 hrs                                1  [  ] ICU/CCU stay > 24 hours                              1  [ x ] Age > 60                                                      1  IMPROVE VTE Score _2________  IMPROVE Score 0-1: Low Risk, No VTE prophylaxis required for most patients, encourage ambulation.   IMPROVE Score 2-3: At risk, pharmacologic VTE prophylaxis is indicated for most patients (in the absence of a contraindication)  IMPROVE Score > or = 4: High Risk, pharmacologic VTE prophylaxis is indicated for most patients (in the absence of a contraindication)  -will hold anticoagulants

## 2019-08-08 NOTE — ED ADULT NURSE NOTE - ED STAT RN HANDOFF DETAILS
PLT's started as ordered @625pm no A/R noted Patient remains stable hemodynamically endorsed to Kalli NEVILLE , safety and comfort maintained

## 2019-08-08 NOTE — ED PROVIDER NOTE - CARE PLAN
Principal Discharge DX:	Shortness of breath Principal Discharge DX:	Shortness of breath  Secondary Diagnosis:	Symptomatic anemia  Secondary Diagnosis:	Troponin I above reference range

## 2019-08-08 NOTE — CONSULT NOTE ADULT - PROBLEM SELECTOR RECOMMENDATION 2
?progression of MDS or BM suppression from chemo  she got 2 u of PRBC  do FOBT, if it is grossly positive  she will need platelet transfusion  if FOBT neg, can hold platelet transfusion  check anemia panel, LDH

## 2019-08-08 NOTE — ED PROVIDER NOTE - PHYSICAL EXAMINATION
Gen: Alert, NAD  Head: NC, AT   Eyes: PERRL, EOMI, normal lids/conjunctiva  ENT: normal hearing, patent oropharynx without erythema/exudate, uvula midline  Neck: supple, no tenderness, Trachea midline  Pulm: Bilateral BS, normal resp effort, no wheeze/stridor/retractions  CV: RRR, 1/6 systolic murmur best heard lusb. 2+ radial and dp pulses bl, no edema  Abd: soft, NT/ND, +BS, no hepatosplenomegaly  Mskel: extremities x4 with normal ROM and no joint effusions. no ctl spine ttp.   Skin: no rash, no bruising   Neuro: AAOx3, no sensory/motor deficits, CN 2-12 intact

## 2019-08-08 NOTE — ED ADULT NURSE REASSESSMENT NOTE - NS ED NURSE REASSESS COMMENT FT1
Pt completed Platelets at 1925 with no adverse reaction noted. First unit of PRBCs started at 1950 with no adverse reactions noted. All safety precautions in place.

## 2019-08-08 NOTE — H&P ADULT - HISTORY OF PRESENT ILLNESS
74 y old F from home with PMH of HTN, palpitations and Myelodysplastic syndrome(on chemotherapy) presented to ED with palpitations and SOB that started this morning and lasted for 40 mins. Palpitations got relieved after she raised her arm up and massaged her neck. Pt had similar episodes of palpitations since childhood and saw her cardiologist for it but all her tests were normal. Palpitations were associated with dizziness. Pt complains of constipation that lasted for 4 days and she had 2 episodes of black stools ,one was yesterday and the other was day before yesterday. Pt is non vegetarian and has good appetite. Pt denies abdominal pain, vomiting, nausea, chest pain, sore tongue, extremity numbness, decreased cognition, blood in urine, petechiae, bruises, yellowing of skin, gum bleeding, nose bleeding, recent exposure to hair dyes or insecticides.   In the ED, vitals wnl. Pt not in any acute distress.

## 2019-08-08 NOTE — H&P ADULT - NSHPPHYSICALEXAM_GEN_ALL_CORE
ICU Vital Signs Last 24 Hrs  T(C): 37.1 (08 Aug 2019 20:05), Max: 37.3 (08 Aug 2019 15:14)  T(F): 98.8 (08 Aug 2019 20:05), Max: 99.2 (08 Aug 2019 15:14)  HR: 79 (08 Aug 2019 20:05) (76 - 83)  BP: 134/59 (08 Aug 2019 20:05) (122/54 - 139/84)  BP(mean): --  ABP: --  ABP(mean): --  RR: 20 (08 Aug 2019 20:05) (17 - 20)  SpO2: 98% (08 Aug 2019 20:05) (98% - 100%)

## 2019-08-08 NOTE — CONSULT NOTE ADULT - SUBJECTIVE AND OBJECTIVE BOX
HPI:  74 y old F from home with PMH of HTN, palpitations and Myelodysplastic syndrome(on chemotherapy) presented to ED with palpitations and SOB that started this morning and lasted for 40 mins. Palpitations got relieved after she raised her arm up and massaged her neck. Pt had similar episodes of palpitations since childhood and saw her cardiologist for it but all her tests were normal. Palpitations were associated with dizziness. Pt complains of constipation that lasted for 4 days and she had 2 episodes of black stools ,one was yesterday and the other was day before yesterday. Pt is non vegetarian and has good appetite. Pt denies abdominal pain, vomiting, nausea, chest pain, sore tongue, extremity numbness, decreased cognition, blood in urine, petechiae, bruises, yellowing of skin, gum bleeding, nose bleeding, recent exposure to hair dyes or insecticides.   In the ED, vitals wnl. Pt not in any acute distress. (08 Aug 2019 19:40)     Pt is seen and examined  pt is awake and lying in bed/out of bed to chair  pt seems comfortable and denies any complaints at this time    ROS:  Negative except for:    MEDICATIONS  (STANDING):  ferrous    sulfate 325 milliGRAM(s) Oral daily  folic acid 1 milliGRAM(s) Oral daily  gabapentin 300 milliGRAM(s) Oral three times a day  pantoprazole    Tablet 40 milliGRAM(s) Oral before breakfast  predniSONE   Tablet 80 milliGRAM(s) Oral daily    MEDICATIONS  (PRN):      Allergies    No Known Allergies    Intolerances        Vital Signs Last 24 Hrs  T(C): 37.1 (08 Aug 2019 20:05), Max: 37.3 (08 Aug 2019 15:14)  T(F): 98.8 (08 Aug 2019 20:05), Max: 99.2 (08 Aug 2019 15:14)  HR: 79 (08 Aug 2019 20:05) (76 - 83)  BP: 134/59 (08 Aug 2019 20:05) (122/54 - 139/84)  BP(mean): --  RR: 20 (08 Aug 2019 20:05) (17 - 20)  SpO2: 98% (08 Aug 2019 20:05) (98% - 100%)    PHYSICAL EXAM  General: adult in NAD  HEENT: clear oropharynx, anicteric sclera, pink conjunctiva  Neck: supple  CV: normal S1/S2 with no murmur rubs or gallops  Lungs: positive air movement b/l ant lungs,clear to auscultation, no wheezes, no rales  Abdomen: soft non-tender non-distended, no hepatosplenomegaly  Ext: no clubbing cyanosis or edema  Skin: no rashes and no petechiae  Neuro: alert and oriented X 4, no focal deficits  LABS:                          6.3    4.84  )-----------( 18       ( 08 Aug 2019 16:11 )             19.9         Mean Cell Volume : 98.0 fl  Mean Cell Hemoglobin : 31.0 pg  Mean Cell Hemoglobin Concentration : 31.7 gm/dL  Auto Neutrophil # : 2.90 K/uL  Auto Lymphocyte # : 1.60 K/uL  Auto Monocyte # : 0.24 K/uL  Auto Eosinophil # : 0.05 K/uL  Auto Basophil # : 0.00 K/uL  Auto Neutrophil % : 56.0 %  Auto Lymphocyte % : 33.0 %  Auto Monocyte % : 5.0 %  Auto Eosinophil % : 1.0 %  Auto Basophil % : 0.0 %    Serial CBC  Hematocrit 19.9  Hemoglobin 6.3  Plat 18  RBC 2.03  WBC 4.84    08-08    139  |  103  |  10  ----------------------------<  180<H>  4.2   |  28  |  0.88    Ca    9.0      08 Aug 2019 16:11  Mg     2.1     08-08    TPro  6.9  /  Alb  3.4<L>  /  TBili  2.2<H>  /  DBili  x   /  AST  22  /  ALT  20  /  AlkPhos  44  08-08      PT/INR - ( 08 Aug 2019 16:11 )   PT: 13.5 sec;   INR: 1.21 ratio         PTT - ( 08 Aug 2019 16:11 )  PTT:25.6 sec              BLOOD SMEAR INTERPRETATION:       RADIOLOGY & ADDITIONAL STUDIES:

## 2019-08-09 VITALS
RESPIRATION RATE: 18 BRPM | SYSTOLIC BLOOD PRESSURE: 132 MMHG | DIASTOLIC BLOOD PRESSURE: 75 MMHG | HEART RATE: 74 BPM | OXYGEN SATURATION: 97 % | TEMPERATURE: 98 F

## 2019-08-09 DIAGNOSIS — K92.2 GASTROINTESTINAL HEMORRHAGE, UNSPECIFIED: ICD-10-CM

## 2019-08-09 LAB
ALBUMIN SERPL ELPH-MCNC: 3.3 G/DL — LOW (ref 3.5–5)
ALP SERPL-CCNC: 43 U/L — SIGNIFICANT CHANGE UP (ref 40–120)
ALT FLD-CCNC: 18 U/L DA — SIGNIFICANT CHANGE UP (ref 10–60)
ANION GAP SERPL CALC-SCNC: 9 MMOL/L — SIGNIFICANT CHANGE UP (ref 5–17)
AST SERPL-CCNC: 22 U/L — SIGNIFICANT CHANGE UP (ref 10–40)
BASOPHILS # BLD AUTO: 0 K/UL — SIGNIFICANT CHANGE UP (ref 0–0.2)
BASOPHILS NFR BLD AUTO: 0 % — SIGNIFICANT CHANGE UP (ref 0–2)
BILIRUB SERPL-MCNC: 2.4 MG/DL — HIGH (ref 0.2–1.2)
BUN SERPL-MCNC: 10 MG/DL — SIGNIFICANT CHANGE UP (ref 7–18)
CALCIUM SERPL-MCNC: 9 MG/DL — SIGNIFICANT CHANGE UP (ref 8.4–10.5)
CHLORIDE SERPL-SCNC: 103 MMOL/L — SIGNIFICANT CHANGE UP (ref 96–108)
CK MB BLD-MCNC: 7.3 % — HIGH (ref 0–3.5)
CK MB CFR SERPL CALC: 2.4 NG/ML — SIGNIFICANT CHANGE UP (ref 0–3.6)
CK SERPL-CCNC: 33 U/L — SIGNIFICANT CHANGE UP (ref 21–215)
CO2 SERPL-SCNC: 28 MMOL/L — SIGNIFICANT CHANGE UP (ref 22–31)
CREAT SERPL-MCNC: 0.77 MG/DL — SIGNIFICANT CHANGE UP (ref 0.5–1.3)
EOSINOPHIL # BLD AUTO: 0.04 K/UL — SIGNIFICANT CHANGE UP (ref 0–0.5)
EOSINOPHIL NFR BLD AUTO: 1 % — SIGNIFICANT CHANGE UP (ref 0–6)
FERRITIN SERPL-MCNC: 1444 NG/ML — HIGH (ref 15–150)
FOLATE SERPL-MCNC: >20 NG/ML — SIGNIFICANT CHANGE UP
GLUCOSE SERPL-MCNC: 90 MG/DL — SIGNIFICANT CHANGE UP (ref 70–99)
HCT VFR BLD CALC: 27 % — LOW (ref 34.5–45)
HGB BLD-MCNC: 9.1 G/DL — LOW (ref 11.5–15.5)
INR BLD: 1.17 RATIO — HIGH (ref 0.88–1.16)
LDH SERPL L TO P-CCNC: 425 U/L — HIGH (ref 120–225)
LYMPHOCYTES # BLD AUTO: 2.15 K/UL — SIGNIFICANT CHANGE UP (ref 1–3.3)
LYMPHOCYTES # BLD AUTO: 49 % — HIGH (ref 13–44)
MAGNESIUM SERPL-MCNC: 2.3 MG/DL — SIGNIFICANT CHANGE UP (ref 1.6–2.6)
MCHC RBC-ENTMCNC: 30.8 PG — SIGNIFICANT CHANGE UP (ref 27–34)
MCHC RBC-ENTMCNC: 33.7 GM/DL — SIGNIFICANT CHANGE UP (ref 32–36)
MCV RBC AUTO: 91.5 FL — SIGNIFICANT CHANGE UP (ref 80–100)
MONOCYTES # BLD AUTO: 0.04 K/UL — SIGNIFICANT CHANGE UP (ref 0–0.9)
MONOCYTES NFR BLD AUTO: 1 % — LOW (ref 2–14)
NEUTROPHILS # BLD AUTO: 2.06 K/UL — SIGNIFICANT CHANGE UP (ref 1.8–7.4)
NEUTROPHILS NFR BLD AUTO: 47 % — SIGNIFICANT CHANGE UP (ref 43–77)
PHOSPHATE SERPL-MCNC: 4.3 MG/DL — SIGNIFICANT CHANGE UP (ref 2.5–4.5)
PLATELET # BLD AUTO: 71 K/UL — LOW (ref 150–400)
POTASSIUM SERPL-MCNC: 3.8 MMOL/L — SIGNIFICANT CHANGE UP (ref 3.5–5.3)
POTASSIUM SERPL-SCNC: 3.8 MMOL/L — SIGNIFICANT CHANGE UP (ref 3.5–5.3)
PROT SERPL-MCNC: 7 G/DL — SIGNIFICANT CHANGE UP (ref 6–8.3)
PROTHROM AB SERPL-ACNC: 13.1 SEC — HIGH (ref 10–12.9)
RBC # BLD: 2.95 M/UL — LOW (ref 3.8–5.2)
RBC # BLD: 2.95 M/UL — LOW (ref 3.8–5.2)
RBC # FLD: 22.7 % — HIGH (ref 10.3–14.5)
RETICS #: 121.8 K/UL — SIGNIFICANT CHANGE UP (ref 25–125)
RETICS/RBC NFR: 4.1 % — HIGH (ref 0.5–2.5)
SODIUM SERPL-SCNC: 140 MMOL/L — SIGNIFICANT CHANGE UP (ref 135–145)
TROPONIN I SERPL-MCNC: 0.3 NG/ML — HIGH (ref 0–0.04)
VIT B12 SERPL-MCNC: 852 PG/ML — SIGNIFICANT CHANGE UP (ref 232–1245)
WBC # BLD: 4.38 K/UL — SIGNIFICANT CHANGE UP (ref 3.8–10.5)
WBC # FLD AUTO: 4.38 K/UL — SIGNIFICANT CHANGE UP (ref 3.8–10.5)

## 2019-08-09 PROCEDURE — 71045 X-RAY EXAM CHEST 1 VIEW: CPT

## 2019-08-09 PROCEDURE — 36430 TRANSFUSION BLD/BLD COMPNT: CPT

## 2019-08-09 PROCEDURE — 82550 ASSAY OF CK (CPK): CPT

## 2019-08-09 PROCEDURE — 80053 COMPREHEN METABOLIC PANEL: CPT

## 2019-08-09 PROCEDURE — 85730 THROMBOPLASTIN TIME PARTIAL: CPT

## 2019-08-09 PROCEDURE — 83735 ASSAY OF MAGNESIUM: CPT

## 2019-08-09 PROCEDURE — 85379 FIBRIN DEGRADATION QUANT: CPT

## 2019-08-09 PROCEDURE — 84484 ASSAY OF TROPONIN QUANT: CPT

## 2019-08-09 PROCEDURE — 83615 LACTATE (LD) (LDH) ENZYME: CPT

## 2019-08-09 PROCEDURE — 85610 PROTHROMBIN TIME: CPT

## 2019-08-09 PROCEDURE — 82553 CREATINE MB FRACTION: CPT

## 2019-08-09 PROCEDURE — 82272 OCCULT BLD FECES 1-3 TESTS: CPT

## 2019-08-09 PROCEDURE — 96374 THER/PROPH/DIAG INJ IV PUSH: CPT

## 2019-08-09 PROCEDURE — 93306 TTE W/DOPPLER COMPLETE: CPT

## 2019-08-09 PROCEDURE — P9040: CPT

## 2019-08-09 PROCEDURE — 36415 COLL VENOUS BLD VENIPUNCTURE: CPT

## 2019-08-09 PROCEDURE — 82728 ASSAY OF FERRITIN: CPT

## 2019-08-09 PROCEDURE — 86901 BLOOD TYPING SEROLOGIC RH(D): CPT

## 2019-08-09 PROCEDURE — 82746 ASSAY OF FOLIC ACID SERUM: CPT

## 2019-08-09 PROCEDURE — 84100 ASSAY OF PHOSPHORUS: CPT

## 2019-08-09 PROCEDURE — 85027 COMPLETE CBC AUTOMATED: CPT

## 2019-08-09 PROCEDURE — 99285 EMERGENCY DEPT VISIT HI MDM: CPT | Mod: 25

## 2019-08-09 PROCEDURE — P9037: CPT

## 2019-08-09 PROCEDURE — 85045 AUTOMATED RETICULOCYTE COUNT: CPT

## 2019-08-09 PROCEDURE — 86900 BLOOD TYPING SEROLOGIC ABO: CPT

## 2019-08-09 PROCEDURE — 82607 VITAMIN B-12: CPT

## 2019-08-09 PROCEDURE — 86850 RBC ANTIBODY SCREEN: CPT

## 2019-08-09 PROCEDURE — 86923 COMPATIBILITY TEST ELECTRIC: CPT

## 2019-08-09 PROCEDURE — 93005 ELECTROCARDIOGRAM TRACING: CPT

## 2019-08-09 PROCEDURE — 84443 ASSAY THYROID STIM HORMONE: CPT

## 2019-08-09 PROCEDURE — 83880 ASSAY OF NATRIURETIC PEPTIDE: CPT

## 2019-08-09 RX ORDER — AMLODIPINE BESYLATE 2.5 MG/1
5 TABLET ORAL ONCE
Refills: 0 | Status: COMPLETED | OUTPATIENT
Start: 2019-08-09 | End: 2019-08-09

## 2019-08-09 RX ADMIN — Medication 325 MILLIGRAM(S): at 12:09

## 2019-08-09 RX ADMIN — AMLODIPINE BESYLATE 5 MILLIGRAM(S): 2.5 TABLET ORAL at 14:30

## 2019-08-09 RX ADMIN — GABAPENTIN 300 MILLIGRAM(S): 400 CAPSULE ORAL at 13:06

## 2019-08-09 RX ADMIN — GABAPENTIN 300 MILLIGRAM(S): 400 CAPSULE ORAL at 06:01

## 2019-08-09 RX ADMIN — Medication 1 MILLIGRAM(S): at 12:09

## 2019-08-09 RX ADMIN — Medication 80 MILLIGRAM(S): at 06:02

## 2019-08-09 RX ADMIN — PANTOPRAZOLE SODIUM 40 MILLIGRAM(S): 20 TABLET, DELAYED RELEASE ORAL at 06:00

## 2019-08-09 NOTE — PROGRESS NOTE ADULT - SUBJECTIVE AND OBJECTIVE BOX
feel ok now  no sob , dizzy, or sob  small piece of stool, dark, but not black    MEDICATIONS  (STANDING):  ferrous    sulfate 325 milliGRAM(s) Oral daily  folic acid 1 milliGRAM(s) Oral daily  gabapentin 300 milliGRAM(s) Oral three times a day  pantoprazole    Tablet 40 milliGRAM(s) Oral before breakfast  predniSONE   Tablet 80 milliGRAM(s) Oral daily    MEDICATIONS  (PRN):      Allergies    No Known Allergies    Intolerances        Vital Signs Last 24 Hrs  T(C): 36.8 (09 Aug 2019 07:35), Max: 37.3 (08 Aug 2019 15:14)  T(F): 98.2 (09 Aug 2019 07:35), Max: 99.2 (08 Aug 2019 15:14)  HR: 73 (09 Aug 2019 07:35) (70 - 83)  BP: 130/73 (09 Aug 2019 07:35) (122/54 - 153/79)  BP(mean): --  RR: 18 (09 Aug 2019 07:35) (17 - 20)  SpO2: 98% (09 Aug 2019 07:35) (96% - 100%)    PHYSICAL EXAM  General: adult in NAD  HEENT: clear oropharynx, anicteric sclera, pink conjunctiva  Neck: supple  CV: normal S1/S2 with no murmur rubs or gallops  Lungs: positive air movement b/l ant lungs,clear to auscultation, no wheezes, no rales  Abdomen: soft non-tender non-distended, no hepatosplenomegaly  Ext: no clubbing cyanosis or edema  Skin: no rashes and no petechiae  Neuro: alert and oriented X 4, no focal deficits  LABS:                          9.1    4.38  )-----------( 71       ( 09 Aug 2019 07:48 )             27.0         Mean Cell Volume : 91.5 fl  Mean Cell Hemoglobin : 30.8 pg  Mean Cell Hemoglobin Concentration : 33.7 gm/dL  Auto Neutrophil # : 2.06 K/uL  Auto Lymphocyte # : 2.15 K/uL  Auto Monocyte # : 0.04 K/uL  Auto Eosinophil # : 0.04 K/uL  Auto Basophil # : 0.00 K/uL  Auto Neutrophil % : 47.0 %  Auto Lymphocyte % : 49.0 %  Auto Monocyte % : 1.0 %  Auto Eosinophil % : 1.0 %  Auto Basophil % : 0.0 %    Serial CBC  Hematocrit 27.0  Hemoglobin 9.1  Plat 71  RBC 2.95  WBC 4.38  Serial CBC  Hematocrit 23.8  Hemoglobin 7.9  Plat 89  RBC 2.54  WBC 4.68  Serial CBC  Hematocrit 19.9  Hemoglobin 6.3  Plat 18  RBC 2.03  WBC 4.84    08-09    140  |  103  |  10  ----------------------------<  90  3.8   |  28  |  0.77    Ca    9.0      09 Aug 2019 07:48  Phos  4.3     08-09  Mg     2.3     08-09    TPro  7.0  /  Alb  3.3<L>  /  TBili  2.4<H>  /  DBili  x   /  AST  22  /  ALT  18  /  AlkPhos  43  08-09      PT/INR - ( 09 Aug 2019 07:48 )   PT: 13.1 sec;   INR: 1.17 ratio         PTT - ( 08 Aug 2019 16:11 )  PTT:25.6 sec    Reticulocyte Percent: 4.1 % (08-09 @ 07:48)            BLOOD SMEAR INTERPRETATION:       RADIOLOGY & ADDITIONAL STUDIES:

## 2019-08-09 NOTE — PROGRESS NOTE ADULT - SUBJECTIVE AND OBJECTIVE BOX
PGY1 Note discussed with supervising resident and primary attending.    Patient is a 74y old  Female who presents with a chief complaint of weakness (09 Aug 2019 13:22)      INTERVAL HPI/OVERNIGHT EVENTS: Patient seen and examined at bedside. Pt not feeling any palpitations and difficulty breathing. Had 1 episode of brown stools today.    MEDICATIONS  (STANDING):  amLODIPine   Tablet 5 milliGRAM(s) Oral once  ferrous    sulfate 325 milliGRAM(s) Oral daily  folic acid 1 milliGRAM(s) Oral daily  gabapentin 300 milliGRAM(s) Oral three times a day  pantoprazole    Tablet 40 milliGRAM(s) Oral before breakfast  predniSONE   Tablet 80 milliGRAM(s) Oral daily    MEDICATIONS  (PRN):      Allergies    No Known Allergies    Intolerances        REVIEW OF SYSTEMS:  CONSTITUTIONAL: No fever, weight loss, or fatigue  RESPIRATORY: No cough, wheezing, chills or hemoptysis; No shortness of breath  CARDIOVASCULAR: No chest pain, palpitations, dizziness, or leg swelling  GASTROINTESTINAL: No abdominal or epigastric pain. No nausea, vomiting, or hematemesis; No diarrhea or constipation. No melena or hematochezia.  NEUROLOGICAL: No headaches, memory loss, loss of strength, numbness, or tremors  SKIN: No itching, burning, rashes, or lesions     Vital Signs Last 24 Hrs  T(C): 36.2 (09 Aug 2019 11:21), Max: 37.3 (08 Aug 2019 15:14)  T(F): 97.2 (09 Aug 2019 11:21), Max: 99.2 (08 Aug 2019 15:14)  HR: 75 (09 Aug 2019 11:21) (70 - 83)  BP: 145/72 (09 Aug 2019 11:21) (122/54 - 153/79)  BP(mean): --  RR: 18 (09 Aug 2019 11:21) (17 - 20)  SpO2: 98% (09 Aug 2019 11:21) (96% - 100%)    PHYSICAL EXAM:  GENERAL: NAD, well-groomed, well-developed  HEAD:  Atraumatic, Normocephalic  EYES: EOMI, PERRLA, conjunctiva and sclera clear  NECK: Supple, No JVD, Normal thyroid  CHEST/LUNG: Clear to percussion bilaterally; No rales, rhonchi, wheezing, or rubs  HEART: Regular rate and rhythm; No murmurs, rubs, or gallops  ABDOMEN: Soft, Nontender, Nondistended; Bowel sounds present  NERVOUS SYSTEM:  Alert & Oriented X3, Good concentration; Motor Strength 5/5 B/L   EXTREMITIES:  2+ Peripheral Pulses, No clubbing, cyanosis, or edema  SKIN;    LABS:                        9.1    4.38  )-----------( 71       ( 09 Aug 2019 07:48 )             27.0     08-09    140  |  103  |  10  ----------------------------<  90  3.8   |  28  |  0.77    Ca    9.0      09 Aug 2019 07:48  Phos  4.3     08-09  Mg     2.3     08-09    TPro  7.0  /  Alb  3.3<L>  /  TBili  2.4<H>  /  DBili  x   /  AST  22  /  ALT  18  /  AlkPhos  43  08-09    PT/INR - ( 09 Aug 2019 07:48 )   PT: 13.1 sec;   INR: 1.17 ratio         PTT - ( 08 Aug 2019 16:11 )  PTT:25.6 sec    CAPILLARY BLOOD GLUCOSE          RADIOLOGY & ADDITIONAL TESTS:    Imaging Personally Reviewed:  [x ] YES  [ ] NO    Consultant(s) Notes Reviewed:  [ x] YES  [ ] NO

## 2019-08-09 NOTE — PROGRESS NOTE ADULT - PROBLEM SELECTOR PLAN 2
s/p sq chemo, either vidaza or Debbie-c
- Troponins 0.024---->0.019---->0.259--->0.332---->0.299  -pt had palpitations and SOB; no chest pain  -CXR normal  -EKG: NSR, no STTW changes  -echo pending  -on telemetry

## 2019-08-09 NOTE — PROGRESS NOTE ADULT - ASSESSMENT
74 y old F from home with PMH of HTN, palpitations and Myelodysplastic syndrome(on chemotherapy) presented to ED with palpitations and SOB that started this morning and lasted for 40 mins.  Labs significant for Hb -6.3, platelet count- 18, D-dimer 430, total bilirubin- 2.2, T3- 0.259, Pro-BNP 1230  CXR clear. EKG NSR, no STTW changes       Problem/Plan - 1:  ·  Problem: Symptomatic anemia.  Plan: -S/P PRBC . HH better .   -take iron 325 mg and folic acid 1 mg at home  -FOBT negative  -Outpt follow up      Problem/Plan - 2:  ·  Problem:  Elevated troponin.  Plan: - Troponins 0.024---->0.019---->0.259..  -pt ahd palpitaions and SOB; no chest pain  -CXR normal  -EKG: NSR, no STTW changes-cardiology consulted.      Problem/Plan - 3:  ·  Problem: Essential hypertension.  Plan: -pt on metoprolol tartarate and amlodipine at home  - will hold BP meds  -monitor BP.      Problem/Plan - 4:  ·  Problem: MDS (myelodysplastic syndrome).  Plan: -pt has h/o MDS  -on chemotherapy; Primary team to confirm her chemotherapy regimen with the [facility].

## 2019-08-09 NOTE — DISCHARGE NOTE PROVIDER - NSDCCPCAREPLAN_GEN_ALL_CORE_FT
PRINCIPAL DISCHARGE DIAGNOSIS  Diagnosis: Symptomatic anemia  Assessment and Plan of Treatment: You were admitted to the hospital with palpitations and shortness of breath. Your Hb was 6.3 and we transfused you 2 units of blood and your Hb went up to 7.9. Your platelets were 18 and we transfused you 1 unit of platelets and your platelet count went upto 89. These can be due to Myelodysplastic syndrome. Take iron supplements and folic acid at home. See your hematologist regularly for your blood counts.      SECONDARY DISCHARGE DIAGNOSES  Diagnosis: MDS (myelodysplastic syndrome)  Assessment and Plan of Treatment: Continue with your chemotherapy under Dr. Oseguera    Diagnosis: Essential hypertension  Assessment and Plan of Treatment: Continue with your current BP medications Metoprolol and Amlodipine at home. Eat healthy diet rich in fruits and vegetables. Excercise regularly.  See your primary care doctor regularly. PRINCIPAL DISCHARGE DIAGNOSIS  Diagnosis: Symptomatic anemia  Assessment and Plan of Treatment: You were admitted to the hospital with palpitations and shortness of breath. Your Hb was 6.3 and we transfused you 2 units of blood and your Hb went up to 7.9. Your platelets were 18 and we transfused you 1 unit of platelets and your platelet count went upto 89. These can be due to Myelodysplastic syndrome. Take iron supplements and folic acid at home. We did Echo that is ultrasonography of the heart. Follow the results of the echo. See your hematologist regularly for your blood counts.      SECONDARY DISCHARGE DIAGNOSES  Diagnosis: MDS (myelodysplastic syndrome)  Assessment and Plan of Treatment: Continue with your chemotherapy under Dr. Oseguera    Diagnosis: Essential hypertension  Assessment and Plan of Treatment: Continue with your current BP medications Metoprolol and Amlodipine at home. Eat healthy diet rich in fruits and vegetables. Excercise regularly.  See your primary care doctor regularly.

## 2019-08-09 NOTE — DISCHARGE NOTE NURSING/CASE MANAGEMENT/SOCIAL WORK - NSDCDPATPORTLINK_GEN_ALL_CORE
You can access the [facility] Patient Portal, offered by [facility], by registering with the following website: [email or web address]

## 2019-08-09 NOTE — PROGRESS NOTE ADULT - PROBLEM SELECTOR PLAN 3
/GIB  check FOBT
-pt on metoprolol tartarate and amlodipine at home  - c/w amlodipine 5 mg  -monitor BP

## 2019-08-09 NOTE — CONSULT NOTE ADULT - SUBJECTIVE AND OBJECTIVE BOX
Khris Braun MD  Interventional Cardiology   [place] Office : [phone] [place] [address]. [phone]  Tel:   [place] Office : [address] [facility] [place]. [address]  Tel: [phone]  Cell : [phone]    HISTORY OF PRESENT ILLNESS:    74 y old F from home with PMH of HTN, palpitations and Myelodysplastic syndrome(on chemotherapy) presented to ED with palpitations and SOB that started this morning and lasted for 40 mins. Palpitations got relieved after she raised her arm up and massaged her neck. Pt had similar episodes of palpitations since childhood and saw her cardiologist for it but all her tests were normal. Palpitations were associated with dizziness. Pt complains of constipation that lasted for 4 days and she had 2 episodes of black stools ,one was yesterday and the other was day before yesterday. Pt is non vegetarian and has good appetite. Pt denies abdominal pain, vomiting, nausea, chest pain, sore tongue, extremity numbness, decreased cognition, blood in urine, petechiae, bruises, yellowing of skin, gum bleeding, nose bleeding, recent exposure to hair dyes or insecticides.   In the ED, vitals wnl. Pt not in any acute distress.     PAST MEDICAL & SURGICAL HISTORY:  Anemia  MDS (myelodysplastic syndrome)  Fibroids  H/O rheumatic heart disease  Essential hypertension  H/O abdominal hysterectomy    	    MEDICATIONS:        gabapentin 300 milliGRAM(s) Oral three times a day    pantoprazole    Tablet 40 milliGRAM(s) Oral before breakfast    predniSONE   Tablet 80 milliGRAM(s) Oral daily    ferrous    sulfate 325 milliGRAM(s) Oral daily  folic acid 1 milliGRAM(s) Oral daily      FAMILY HISTORY:  FH: breast cancer: sister        Allergies    No Known Allergies    Intolerances    	      PHYSICAL EXAM:  T(C): 36.7 (08-09-19 @ 15:12), Max: 36.9 (08-09-19 @ 04:15)  HR: 74 (08-09-19 @ 15:12) (70 - 75)  BP: 132/75 (08-09-19 @ 15:12) (130/73 - 153/79)  RR: 18 (08-09-19 @ 15:12) (18 - 20)  SpO2: 97% (08-09-19 @ 15:12) (97% - 100%)  Wt(kg): --  I&O's Summary      Appearance: Normal	  HEENT:   Normal oral mucosa, PERRL, EOMI	  Cardiovascular: Normal S1 S2, No JVD, No murmurs, No edema  Respiratory: Lungs clear to auscultation	  Gastrointestinal:  Soft, Non-tender, + BS	  Extremities: Normal range of motion, No clubbing, cyanosis or edema    LABS:	 	    CARDIAC MARKERS:  Troponin I, Serum: 0.299 ng/mL (08-09 @ 07:48)                                  9.1    4.38  )-----------( 71       ( 09 Aug 2019 07:48 )             27.0     08-09    140  |  103  |  10  ----------------------------<  90  3.8   |  28  |  0.77    Ca    9.0      09 Aug 2019 07:48  Phos  4.3     08-09  Mg     2.3     08-09    TPro  7.0  /  Alb  3.3<L>  /  TBili  2.4<H>  /  DBili  x   /  AST  22  /  ALT  18  /  AlkPhos  43  08-09    proBNP:   Lipid Profile:   HgA1c:   TSH:     ASSESSMENT/PLAN:

## 2019-08-09 NOTE — PROGRESS NOTE ADULT - PROBLEM SELECTOR PLAN 1
had PRBC and platelet transfusion  feel better
-pt had an episode of SOB and palpitations  -Hb 6.3 on admission, platelet count 18  -Can be multifactorial    2 episodes of black stools (can be due to iron pills she takes at home)    H/o MDS (on chemotherapy)- azacitidine injection   -take iron 325 mg and folic acid 1 mg at home  -FOBT negative  - 2 units PRBC and 1unit platelets given in ED  -Hb: 9.1 ;platelet count: 71; LDH: 425; Reticulocyte %: 4.1; total bilirubin: 2.4

## 2019-08-09 NOTE — PROGRESS NOTE ADULT - SUBJECTIVE AND OBJECTIVE BOX
INTERVAL HPI/OVERNIGHT EVENTS: Today had brown stool but on Iron.   Vital Signs Last 24 Hrs  T(C): 36.2 (09 Aug 2019 11:21), Max: 37.3 (08 Aug 2019 15:14)  T(F): 97.2 (09 Aug 2019 11:21), Max: 99.2 (08 Aug 2019 15:14)  HR: 75 (09 Aug 2019 11:21) (70 - 83)  BP: 145/72 (09 Aug 2019 11:21) (122/54 - 153/79)  BP(mean): --  RR: 18 (09 Aug 2019 11:21) (17 - 20)  SpO2: 98% (09 Aug 2019 11:21) (96% - 100%)  I&O's Summary    MEDICATIONS  (STANDING):  ferrous    sulfate 325 milliGRAM(s) Oral daily  folic acid 1 milliGRAM(s) Oral daily  gabapentin 300 milliGRAM(s) Oral three times a day  pantoprazole    Tablet 40 milliGRAM(s) Oral before breakfast  predniSONE   Tablet 80 milliGRAM(s) Oral daily    MEDICATIONS  (PRN):    LABS:                        9.1    4.38  )-----------( 71       ( 09 Aug 2019 07:48 )             27.0     08-09    140  |  103  |  10  ----------------------------<  90  3.8   |  28  |  0.77    Ca    9.0      09 Aug 2019 07:48  Phos  4.3     08-09  Mg     2.3     08-09    TPro  7.0  /  Alb  3.3<L>  /  TBili  2.4<H>  /  DBili  x   /  AST  22  /  ALT  18  /  AlkPhos  43  08-09    PT/INR - ( 09 Aug 2019 07:48 )   PT: 13.1 sec;   INR: 1.17 ratio         PTT - ( 08 Aug 2019 16:11 )  PTT:25.6 sec    CAPILLARY BLOOD GLUCOSE              REVIEW OF SYSTEMS:  CONSTITUTIONAL: No fever, weight loss, or fatigue  EYES: No eye pain, visual disturbances, or discharge  RESPIRATORY: No cough, wheezing, chills or hemoptysis; No shortness of breath  CARDIOVASCULAR: No chest pain, palpitations, dizziness, or leg swelling  GASTROINTESTINAL: No abdominal or epigastric pain. No nausea, vomiting, or hematemesis; No diarrhea or constipation. No melena or hematochezia.  GENITOURINARY: No dysuria, frequency, hematuria, or incontinence  NEUROLOGICAL: No headaches, memory loss, loss of strength, numbness, or tremors    Consultant(s) Notes Reviewed:  [x ] YES  [ ] NO    PHYSICAL EXAM:  GENERAL: NAD, Obese   HEAD:  Atraumatic, Normocephalic  EYES: EOMI, PERRLA, conjunctiva and sclera clear  ENMT: No tonsillar erythema, exudates, or enlargement; Moist mucous membranes, Good dentition, No lesions  NECK: Supple, No JVD, Normal thyroid  NERVOUS SYSTEM:  Alert & Oriented X3, No focal deficit   CHEST/LUNG: Good air entry bilateral with no  rales, rhonchi, wheezing, or rubs  HEART: Regular rate and rhythm; No murmurs, rubs, or gallops  ABDOMEN: Soft, Nontender, Nondistended; Bowel sounds present  EXTREMITIES:  2+ Peripheral Pulses, No clubbing, cyanosis, or edema    Care Discussed with Consultants/Other Providers [ x] YES  [ ] NO

## 2019-08-09 NOTE — PROGRESS NOTE ADULT - ASSESSMENT
74 y old F from home with PMH of HTN, palpitations and Myelodysplastic syndrome(on chemotherapy) presented to ED with palpitations and SOB that started this morning and lasted for 40 mins.

## 2019-08-09 NOTE — CONSULT NOTE ADULT - ASSESSMENT
EKG NSR no ischemic changes     Echo < from: Transthoracic Echocardiogram (04.01.19 @ 07:16) >  1. Mitral annular calcification. Mild mitral regurgitation.    2. Calcified trileaflet aortic valve withnormal opening.  3. Aortic Root: 3.0 cm.  4. Severely dilated left atrium.  LA volume index = 51  cc/m2.  5. Mild concentric left ventricular hypertrophy.  6. Normal Left Ventricular Systolic Function,  (EF = 55 to  60%)  7. Grade II diastolic dysfunction.  8. Normal right atrium.  9. Normal right ventricular size and systolic function  (TAPSE 2.5 cm).  10. RA Pressure is 8 mm Hg.  11. RV systolic pressure is mildly increased at  49 mm Hg.  12. There is mild tricuspid regurgitation.  13. There ismild pulmonic regurgitation.  14. Normal pericardium with no pericardial effusion.    < end of copied text >      Assessment and Plan:    1) NSTEMI: Pt CP free , likely type 2 MI , CK negative , can get echo as op , office information given, pt wants to go home     2) Anemia s/p transfusion t/t per primary team     3) DVT PPX SCD

## 2019-08-09 NOTE — DISCHARGE NOTE PROVIDER - HOSPITAL COURSE
74 y old F from home with PMH of HTN, palpitations and Myelodysplastic syndrome(on chemotherapy) presented to ED with palpitations and SOB that started in the morning and lasted for 40 mins. Palpitations got relieved after she raised her arm up and massaged her neck. Pt had similar episodes of palpitations since childhood and saw her cardiologist for it but all her tests were normal. Palpitations were associated with dizziness. Pt complains of constipation that lasted for 4 days and she had 2 episodes of black stools ,one was yesterday and the other was day before yesterday. Pt is non vegetarian and has good appetite. Pt denies abdominal pain, vomiting, nausea, chest pain, sore tongue, extremity numbness, decreased cognition, blood in urine, petechiae, bruises, yellowing of skin, gum bleeding, nose bleeding, recent exposure to hair dyes or insecticides.     FOBT is negative. EKG was normal.    Hb was 6.3 on admission and platelet count was 18. Pt was transfused 2 units PRBC and 1 unit platelets and her HB went to 7.9 and Platelets went upto 89.     Overall condition improved. Pt ready for discharge. Discussed with the attending. 74 y old F from home with PMH of HTN, palpitations and Myelodysplastic syndrome(on chemotherapy) presented to ED with palpitations and SOB that started in the morning and lasted for 40 mins. Palpitations got relieved after she raised her arm up and massaged her neck. Pt had similar episodes of palpitations since childhood and saw her cardiologist for it but all her tests were normal. Palpitations were associated with dizziness. Pt complains of constipation that lasted for 4 days and she had 2 episodes of black stools ,one was yesterday and the other was day before yesterday. Pt is non vegetarian and has good appetite. Pt denies abdominal pain, vomiting, nausea, chest pain, sore tongue, extremity numbness, decreased cognition, blood in urine, petechiae, bruises, yellowing of skin, gum bleeding, nose bleeding, recent exposure to hair dyes or insecticides.     FOBT is negative. EKG was normal. Echo performed.    Hb was 6.3 on admission and platelet count was 18. Pt was transfused 2 units PRBC and 1 unit platelets and her HB went to 7.9 and Platelets went upto 89.     Overall condition improved. Pt ready for discharge. Discussed with the attending.

## 2019-11-09 ENCOUNTER — EMERGENCY (EMERGENCY)
Facility: HOSPITAL | Age: 74
LOS: 1 days | Discharge: HOME | End: 2019-11-09
Attending: EMERGENCY MEDICINE
Payer: COMMERCIAL

## 2019-11-09 VITALS
DIASTOLIC BLOOD PRESSURE: 95 MMHG | HEART RATE: 84 BPM | OXYGEN SATURATION: 100 % | HEIGHT: 62 IN | WEIGHT: 190.04 LBS | TEMPERATURE: 98 F | RESPIRATION RATE: 20 BRPM | SYSTOLIC BLOOD PRESSURE: 169 MMHG

## 2019-11-09 VITALS
SYSTOLIC BLOOD PRESSURE: 167 MMHG | RESPIRATION RATE: 18 BRPM | HEART RATE: 75 BPM | OXYGEN SATURATION: 100 % | TEMPERATURE: 98 F | DIASTOLIC BLOOD PRESSURE: 78 MMHG

## 2019-11-09 DIAGNOSIS — Z90.710 ACQUIRED ABSENCE OF BOTH CERVIX AND UTERUS: Chronic | ICD-10-CM

## 2019-11-09 LAB
ALBUMIN SERPL ELPH-MCNC: 3.7 G/DL — SIGNIFICANT CHANGE UP (ref 3.5–5)
ALP SERPL-CCNC: 54 U/L — SIGNIFICANT CHANGE UP (ref 40–120)
ALT FLD-CCNC: 22 U/L DA — SIGNIFICANT CHANGE UP (ref 10–60)
ANION GAP SERPL CALC-SCNC: 11 MMOL/L — SIGNIFICANT CHANGE UP (ref 5–17)
APPEARANCE UR: CLEAR — SIGNIFICANT CHANGE UP
AST SERPL-CCNC: 18 U/L — SIGNIFICANT CHANGE UP (ref 10–40)
BILIRUB DIRECT SERPL-MCNC: 0.5 MG/DL — HIGH (ref 0–0.2)
BILIRUB INDIRECT FLD-MCNC: 1.9 MG/DL — HIGH (ref 0.2–1)
BILIRUB SERPL-MCNC: 2.4 MG/DL — HIGH (ref 0.2–1.2)
BILIRUB SERPL-MCNC: 2.4 MG/DL — HIGH (ref 0.2–1.2)
BILIRUB UR-MCNC: NEGATIVE — SIGNIFICANT CHANGE UP
BUN SERPL-MCNC: 20 MG/DL — HIGH (ref 7–18)
CALCIUM SERPL-MCNC: 9.2 MG/DL — SIGNIFICANT CHANGE UP (ref 8.4–10.5)
CHLORIDE SERPL-SCNC: 104 MMOL/L — SIGNIFICANT CHANGE UP (ref 96–108)
CO2 SERPL-SCNC: 25 MMOL/L — SIGNIFICANT CHANGE UP (ref 22–31)
COLOR SPEC: YELLOW — SIGNIFICANT CHANGE UP
CREAT SERPL-MCNC: 1.09 MG/DL — SIGNIFICANT CHANGE UP (ref 0.5–1.3)
DIFF PNL FLD: ABNORMAL
GLUCOSE SERPL-MCNC: 146 MG/DL — HIGH (ref 70–99)
GLUCOSE UR QL: NEGATIVE — SIGNIFICANT CHANGE UP
HCT VFR BLD CALC: 25.2 % — LOW (ref 34.5–45)
HGB BLD-MCNC: 8.4 G/DL — LOW (ref 11.5–15.5)
KETONES UR-MCNC: NEGATIVE — SIGNIFICANT CHANGE UP
LEUKOCYTE ESTERASE UR-ACNC: NEGATIVE — SIGNIFICANT CHANGE UP
LIDOCAIN IGE QN: 192 U/L — SIGNIFICANT CHANGE UP (ref 73–393)
MCHC RBC-ENTMCNC: 32.2 PG — SIGNIFICANT CHANGE UP (ref 27–34)
MCHC RBC-ENTMCNC: 33.3 GM/DL — SIGNIFICANT CHANGE UP (ref 32–36)
MCV RBC AUTO: 96.6 FL — SIGNIFICANT CHANGE UP (ref 80–100)
NITRITE UR-MCNC: NEGATIVE — SIGNIFICANT CHANGE UP
NRBC # BLD: 6 /100 WBCS — HIGH (ref 0–0)
PH UR: 6 — SIGNIFICANT CHANGE UP (ref 5–8)
PLATELET # BLD AUTO: 32 K/UL — LOW (ref 150–400)
POTASSIUM SERPL-MCNC: 4.1 MMOL/L — SIGNIFICANT CHANGE UP (ref 3.5–5.3)
POTASSIUM SERPL-SCNC: 4.1 MMOL/L — SIGNIFICANT CHANGE UP (ref 3.5–5.3)
PROT SERPL-MCNC: 7.2 G/DL — SIGNIFICANT CHANGE UP (ref 6–8.3)
PROT UR-MCNC: NEGATIVE — SIGNIFICANT CHANGE UP
RBC # BLD: 2.61 M/UL — LOW (ref 3.8–5.2)
RBC # FLD: 24.5 % — HIGH (ref 10.3–14.5)
RBC CASTS # UR COMP ASSIST: ABNORMAL /HPF (ref 0–2)
SODIUM SERPL-SCNC: 140 MMOL/L — SIGNIFICANT CHANGE UP (ref 135–145)
SP GR SPEC: 1.02 — SIGNIFICANT CHANGE UP (ref 1.01–1.02)
UROBILINOGEN FLD QL: 1
WBC # BLD: 4.96 K/UL — SIGNIFICANT CHANGE UP (ref 3.8–10.5)
WBC # FLD AUTO: 4.96 K/UL — SIGNIFICANT CHANGE UP (ref 3.8–10.5)
WBC UR QL: SIGNIFICANT CHANGE UP /HPF (ref 0–5)

## 2019-11-09 PROCEDURE — 99284 EMERGENCY DEPT VISIT MOD MDM: CPT | Mod: 25

## 2019-11-09 PROCEDURE — 36415 COLL VENOUS BLD VENIPUNCTURE: CPT

## 2019-11-09 PROCEDURE — 87086 URINE CULTURE/COLONY COUNT: CPT

## 2019-11-09 PROCEDURE — 86850 RBC ANTIBODY SCREEN: CPT

## 2019-11-09 PROCEDURE — 93005 ELECTROCARDIOGRAM TRACING: CPT

## 2019-11-09 PROCEDURE — 85027 COMPLETE CBC AUTOMATED: CPT

## 2019-11-09 PROCEDURE — 82248 BILIRUBIN DIRECT: CPT

## 2019-11-09 PROCEDURE — 74177 CT ABD & PELVIS W/CONTRAST: CPT

## 2019-11-09 PROCEDURE — 80053 COMPREHEN METABOLIC PANEL: CPT

## 2019-11-09 PROCEDURE — 93010 ELECTROCARDIOGRAM REPORT: CPT

## 2019-11-09 PROCEDURE — 86901 BLOOD TYPING SEROLOGIC RH(D): CPT

## 2019-11-09 PROCEDURE — 99284 EMERGENCY DEPT VISIT MOD MDM: CPT

## 2019-11-09 PROCEDURE — 74177 CT ABD & PELVIS W/CONTRAST: CPT | Mod: 26

## 2019-11-09 PROCEDURE — 86900 BLOOD TYPING SEROLOGIC ABO: CPT

## 2019-11-09 PROCEDURE — 83690 ASSAY OF LIPASE: CPT

## 2019-11-09 PROCEDURE — 96374 THER/PROPH/DIAG INJ IV PUSH: CPT | Mod: XU

## 2019-11-09 PROCEDURE — 81001 URINALYSIS AUTO W/SCOPE: CPT

## 2019-11-09 RX ORDER — ONDANSETRON 8 MG/1
4 TABLET, FILM COATED ORAL ONCE
Refills: 0 | Status: COMPLETED | OUTPATIENT
Start: 2019-11-09 | End: 2019-11-09

## 2019-11-09 RX ORDER — SODIUM CHLORIDE 9 MG/ML
1000 INJECTION INTRAMUSCULAR; INTRAVENOUS; SUBCUTANEOUS ONCE
Refills: 0 | Status: COMPLETED | OUTPATIENT
Start: 2019-11-09 | End: 2019-11-09

## 2019-11-09 RX ORDER — ONDANSETRON 8 MG/1
1 TABLET, FILM COATED ORAL
Qty: 21 | Refills: 0
Start: 2019-11-09 | End: 2019-11-15

## 2019-11-09 RX ORDER — METOPROLOL TARTRATE 50 MG
1 TABLET ORAL
Qty: 30 | Refills: 0
Start: 2019-11-09 | End: 2019-12-08

## 2019-11-09 RX ORDER — AMLODIPINE BESYLATE 2.5 MG/1
1 TABLET ORAL
Qty: 30 | Refills: 0
Start: 2019-11-09 | End: 2019-12-08

## 2019-11-09 RX ADMIN — ONDANSETRON 4 MILLIGRAM(S): 8 TABLET, FILM COATED ORAL at 02:16

## 2019-11-09 RX ADMIN — SODIUM CHLORIDE 1000 MILLILITER(S): 9 INJECTION INTRAMUSCULAR; INTRAVENOUS; SUBCUTANEOUS at 02:16

## 2019-11-09 NOTE — ED ADULT NURSE NOTE - NSIMPLEMENTINTERV_GEN_ALL_ED
Implemented All Universal Safety Interventions:  [place] to call system. Call bell, personal items and telephone within reach. Instruct patient to call for assistance. Room bathroom lighting operational. Non-slip footwear when patient is off stretcher. Physically safe environment: no spills, clutter or unnecessary equipment. Stretcher in lowest position, wheels locked, appropriate side rails in place.

## 2019-11-09 NOTE — ED PROVIDER NOTE - PHYSICAL EXAMINATION
GENERAL: wells appearing, no acute distress   HEAD: atraumatic   EYES: EOMI, pink conjunctiva   ENT: moist oral mucosa; facial edema   CARDIAC: RRR, no edema, distal pulses present   RESPIRATORY: lungs CTAB, no increased work of breathing   GASTROINTESTINAL: no abdominal tenderness, no rebound or guarding, bowel sounds presents  GENITOURINARY: no CVA tenderness   MUSCULOSKELETAL: no deformity   NEUROLOGICAL: AAOx3, CN's II-XII intact, strength 5/5 bilateral UE and LE, sensation intact to light touch, finger to nose intact, steady gait  SKIN: intact   PSYCHIATRIC: cooperative  HEME LYMPH: no lymphadenopathy

## 2019-11-09 NOTE — ED PROVIDER NOTE - PROGRESS NOTE DETAILS
labs - Hgb 8.4, plt 32, elevated bili (at baseline from previous labs)   UA - pending   CT - pending labs - Hgb 8.4, plt 32, elevated bili (at baseline from previous labs)   UA - blood, no UTI   CT - no obstruction   Symptoms completely resolved, tolerating PO. Spoke with pt's privated hem/onc Dr France who does not recommend transfusion with plt 32. Will dc with zofran and pt asking for HTN meds as well. Discussed indications for patient return to ED. Patient understood. labs - Hgb 8.4, plt 32, elevated bili (at baseline from previous labs)   UA - blood, no UTI   CT - no obstruction   EKG - nsr, rate 72, PVC, inferior Q waves   Symptoms completely resolved, tolerating PO. Spoke with pt's privated hem/onc Dr France who does not recommend transfusion with plt 32. Will dc with zofran and pt asking for HTN meds as well. Discussed indications for patient return to ED. Patient understood.

## 2019-11-09 NOTE — ED PROVIDER NOTE - OBJECTIVE STATEMENT
75 yo F pmh of MDS and HTN presents with nausea and NBNB vomiting x a few hours. Associated LLQ abd pain. Ran out of zofran at home. Denies fever, diarrhea, constipation, urinary complaints, syncope, other acute complaints.

## 2019-11-09 NOTE — ED ADULT TRIAGE NOTE - CHIEF COMPLAINT QUOTE
Pt stated "I can't stop vomiting" started today.  Pt stated she was taking chemotherapy therapy last therapy was two weeks ago.

## 2019-11-09 NOTE — ED PROVIDER NOTE - PATIENT PORTAL LINK FT
You can access the FollowMyHealth Patient Portal offered by [facility] by registering at the following website: [email or web address]. By joining [employer]’s FollowMyHealth portal, you will also be able to view your health information using other applications (apps) compatible with our system.

## 2019-11-09 NOTE — ED ADULT NURSE NOTE - OBJECTIVE STATEMENT
Pt c/o vomiting" started today and can't stop..  Pt stated she was on chemotherapy therapy last therapy was two weeks ago. was taken zofran, but has run out of it.

## 2019-11-10 LAB
CULTURE RESULTS: SIGNIFICANT CHANGE UP
SPECIMEN SOURCE: SIGNIFICANT CHANGE UP

## 2019-11-15 ENCOUNTER — EMERGENCY (EMERGENCY)
Facility: HOSPITAL | Age: 74
LOS: 1 days | Discharge: HOME | End: 2019-11-15
Attending: EMERGENCY MEDICINE
Payer: COMMERCIAL

## 2019-11-15 VITALS
TEMPERATURE: 98 F | RESPIRATION RATE: 18 BRPM | HEART RATE: 77 BPM | DIASTOLIC BLOOD PRESSURE: 89 MMHG | SYSTOLIC BLOOD PRESSURE: 142 MMHG | OXYGEN SATURATION: 99 %

## 2019-11-15 VITALS
SYSTOLIC BLOOD PRESSURE: 179 MMHG | RESPIRATION RATE: 17 BRPM | DIASTOLIC BLOOD PRESSURE: 100 MMHG | TEMPERATURE: 97 F | OXYGEN SATURATION: 99 % | HEART RATE: 72 BPM | WEIGHT: 190.04 LBS

## 2019-11-15 DIAGNOSIS — Z90.710 ACQUIRED ABSENCE OF BOTH CERVIX AND UTERUS: Chronic | ICD-10-CM

## 2019-11-15 LAB
ALBUMIN SERPL ELPH-MCNC: 4 G/DL — SIGNIFICANT CHANGE UP (ref 3.5–5)
ALP SERPL-CCNC: 53 U/L — SIGNIFICANT CHANGE UP (ref 40–120)
ALT FLD-CCNC: 21 U/L DA — SIGNIFICANT CHANGE UP (ref 10–60)
ANION GAP SERPL CALC-SCNC: 10 MMOL/L — SIGNIFICANT CHANGE UP (ref 5–17)
APPEARANCE UR: CLEAR — SIGNIFICANT CHANGE UP
APTT BLD: 27.1 SEC — LOW (ref 27.5–36.3)
AST SERPL-CCNC: 22 U/L — SIGNIFICANT CHANGE UP (ref 10–40)
BACTERIA # UR AUTO: ABNORMAL /HPF
BASOPHILS # BLD AUTO: 0.01 K/UL — SIGNIFICANT CHANGE UP (ref 0–0.2)
BASOPHILS NFR BLD AUTO: 0.2 % — SIGNIFICANT CHANGE UP (ref 0–2)
BILIRUB SERPL-MCNC: 2.3 MG/DL — HIGH (ref 0.2–1.2)
BILIRUB UR-MCNC: NEGATIVE — SIGNIFICANT CHANGE UP
BUN SERPL-MCNC: 15 MG/DL — SIGNIFICANT CHANGE UP (ref 7–18)
CALCIUM SERPL-MCNC: 8.9 MG/DL — SIGNIFICANT CHANGE UP (ref 8.4–10.5)
CHLORIDE SERPL-SCNC: 104 MMOL/L — SIGNIFICANT CHANGE UP (ref 96–108)
CO2 SERPL-SCNC: 25 MMOL/L — SIGNIFICANT CHANGE UP (ref 22–31)
COLOR SPEC: YELLOW — SIGNIFICANT CHANGE UP
CREAT SERPL-MCNC: 1.11 MG/DL — SIGNIFICANT CHANGE UP (ref 0.5–1.3)
DIFF PNL FLD: ABNORMAL
EOSINOPHIL # BLD AUTO: 0 K/UL — SIGNIFICANT CHANGE UP (ref 0–0.5)
EOSINOPHIL NFR BLD AUTO: 0 % — SIGNIFICANT CHANGE UP (ref 0–6)
EPI CELLS # UR: SIGNIFICANT CHANGE UP /HPF
GLUCOSE SERPL-MCNC: 157 MG/DL — HIGH (ref 70–99)
GLUCOSE UR QL: NEGATIVE — SIGNIFICANT CHANGE UP
HCT VFR BLD CALC: 28.7 % — LOW (ref 34.5–45)
HGB BLD-MCNC: 9.7 G/DL — LOW (ref 11.5–15.5)
IMM GRANULOCYTES NFR BLD AUTO: 1.6 % — HIGH (ref 0–1.5)
INR BLD: 1.1 RATIO — SIGNIFICANT CHANGE UP (ref 0.88–1.16)
KETONES UR-MCNC: NEGATIVE — SIGNIFICANT CHANGE UP
LACTATE SERPL-SCNC: 3.6 MMOL/L — HIGH (ref 0.7–2)
LEUKOCYTE ESTERASE UR-ACNC: ABNORMAL
LIDOCAIN IGE QN: 242 U/L — SIGNIFICANT CHANGE UP (ref 73–393)
LYMPHOCYTES # BLD AUTO: 0.3 K/UL — LOW (ref 1–3.3)
LYMPHOCYTES # BLD AUTO: 5.2 % — LOW (ref 13–44)
MCHC RBC-ENTMCNC: 33.4 PG — SIGNIFICANT CHANGE UP (ref 27–34)
MCHC RBC-ENTMCNC: 33.8 GM/DL — SIGNIFICANT CHANGE UP (ref 32–36)
MCV RBC AUTO: 99 FL — SIGNIFICANT CHANGE UP (ref 80–100)
MONOCYTES # BLD AUTO: 0.17 K/UL — SIGNIFICANT CHANGE UP (ref 0–0.9)
MONOCYTES NFR BLD AUTO: 2.9 % — SIGNIFICANT CHANGE UP (ref 2–14)
NEUTROPHILS # BLD AUTO: 5.23 K/UL — SIGNIFICANT CHANGE UP (ref 1.8–7.4)
NEUTROPHILS NFR BLD AUTO: 90.1 % — HIGH (ref 43–77)
NITRITE UR-MCNC: NEGATIVE — SIGNIFICANT CHANGE UP
NRBC # BLD: 6 /100 WBCS — HIGH (ref 0–0)
PH UR: 7 — SIGNIFICANT CHANGE UP (ref 5–8)
PLATELET # BLD AUTO: 40 K/UL — LOW (ref 150–400)
POTASSIUM SERPL-MCNC: 4.2 MMOL/L — SIGNIFICANT CHANGE UP (ref 3.5–5.3)
POTASSIUM SERPL-SCNC: 4.2 MMOL/L — SIGNIFICANT CHANGE UP (ref 3.5–5.3)
PROT SERPL-MCNC: 7.4 G/DL — SIGNIFICANT CHANGE UP (ref 6–8.3)
PROT UR-MCNC: 15
PROTHROM AB SERPL-ACNC: 12.3 SEC — SIGNIFICANT CHANGE UP (ref 10–12.9)
RBC # BLD: 2.9 M/UL — LOW (ref 3.8–5.2)
RBC # FLD: 25.7 % — HIGH (ref 10.3–14.5)
RBC CASTS # UR COMP ASSIST: ABNORMAL /HPF (ref 0–2)
SODIUM SERPL-SCNC: 139 MMOL/L — SIGNIFICANT CHANGE UP (ref 135–145)
SP GR SPEC: 1.01 — SIGNIFICANT CHANGE UP (ref 1.01–1.02)
UROBILINOGEN FLD QL: 1
WBC # BLD: 5.8 K/UL — SIGNIFICANT CHANGE UP (ref 3.8–10.5)
WBC # FLD AUTO: 5.8 K/UL — SIGNIFICANT CHANGE UP (ref 3.8–10.5)
WBC UR QL: SIGNIFICANT CHANGE UP /HPF (ref 0–5)

## 2019-11-15 PROCEDURE — 85027 COMPLETE CBC AUTOMATED: CPT

## 2019-11-15 PROCEDURE — 74177 CT ABD & PELVIS W/CONTRAST: CPT | Mod: 26

## 2019-11-15 PROCEDURE — 80053 COMPREHEN METABOLIC PANEL: CPT

## 2019-11-15 PROCEDURE — 81001 URINALYSIS AUTO W/SCOPE: CPT

## 2019-11-15 PROCEDURE — 85730 THROMBOPLASTIN TIME PARTIAL: CPT

## 2019-11-15 PROCEDURE — 74177 CT ABD & PELVIS W/CONTRAST: CPT

## 2019-11-15 PROCEDURE — 83605 ASSAY OF LACTIC ACID: CPT

## 2019-11-15 PROCEDURE — 71046 X-RAY EXAM CHEST 2 VIEWS: CPT | Mod: 26

## 2019-11-15 PROCEDURE — 71046 X-RAY EXAM CHEST 2 VIEWS: CPT

## 2019-11-15 PROCEDURE — 93005 ELECTROCARDIOGRAM TRACING: CPT

## 2019-11-15 PROCEDURE — 36415 COLL VENOUS BLD VENIPUNCTURE: CPT

## 2019-11-15 PROCEDURE — 99284 EMERGENCY DEPT VISIT MOD MDM: CPT

## 2019-11-15 PROCEDURE — 83690 ASSAY OF LIPASE: CPT

## 2019-11-15 PROCEDURE — 85610 PROTHROMBIN TIME: CPT

## 2019-11-15 PROCEDURE — 99284 EMERGENCY DEPT VISIT MOD MDM: CPT | Mod: 25

## 2019-11-15 PROCEDURE — 96374 THER/PROPH/DIAG INJ IV PUSH: CPT

## 2019-11-15 RX ORDER — TAMSULOSIN HYDROCHLORIDE 0.4 MG/1
0.4 CAPSULE ORAL AT BEDTIME
Refills: 0 | Status: DISCONTINUED | OUTPATIENT
Start: 2019-11-15 | End: 2019-11-22

## 2019-11-15 RX ORDER — TAMSULOSIN HYDROCHLORIDE 0.4 MG/1
1 CAPSULE ORAL
Qty: 15 | Refills: 0
Start: 2019-11-15 | End: 2019-11-29

## 2019-11-15 RX ORDER — KETOROLAC TROMETHAMINE 30 MG/ML
30 SYRINGE (ML) INJECTION ONCE
Refills: 0 | Status: DISCONTINUED | OUTPATIENT
Start: 2019-11-15 | End: 2019-11-15

## 2019-11-15 RX ORDER — TRAMADOL HYDROCHLORIDE 50 MG/1
1 TABLET ORAL
Qty: 20 | Refills: 0
Start: 2019-11-15 | End: 2019-11-19

## 2019-11-15 RX ORDER — SODIUM CHLORIDE 9 MG/ML
1000 INJECTION INTRAMUSCULAR; INTRAVENOUS; SUBCUTANEOUS ONCE
Refills: 0 | Status: COMPLETED | OUTPATIENT
Start: 2019-11-15 | End: 2019-11-15

## 2019-11-15 RX ADMIN — Medication 30 MILLIGRAM(S): at 20:15

## 2019-11-15 RX ADMIN — Medication 30 MILLIGRAM(S): at 20:40

## 2019-11-15 RX ADMIN — TAMSULOSIN HYDROCHLORIDE 0.4 MILLIGRAM(S): 0.4 CAPSULE ORAL at 20:15

## 2019-11-15 NOTE — ED ADULT NURSE NOTE - CHIEF COMPLAINT QUOTE
nose bleed  stared this PM. no active bleeding in triage. patient also complains o left sided abd pain

## 2019-11-15 NOTE — ED ADULT NURSE NOTE - CHPI ED NUR SYMPTOMS NEG
no fever/no dysuria/no abdominal distension/no chills/no blood in stool/no vomiting/no diarrhea/no hematuria/no nausea

## 2019-11-15 NOTE — ED ADULT NURSE NOTE - OBJECTIVE STATEMENT
Patient present to ED with c/o nose bleed with LUQ abdominal pain starting this afternoon about 1 pm. Patient notes she was told due to her illness that if she has nose bleed to come to ER. Patient also started having LUQ pain at same time on pain scale 6/10 and pressure in sensation no tenderness noted.

## 2019-11-15 NOTE — ED PROVIDER NOTE - OBJECTIVE STATEMENT
Patient is a 75 y/o female with PMHx leukemia, anemia, HTN, fibroids, rheumatic heart disease, MDS, PSHx hysterectomy, c/o L flank pain, nosebleeds, sudden onset LUQ and LLQ pain. Patient notes she is not a candidate for chemo because she has multiple medical problems. She is not actively nosebleeding. There are no other acute complaints.

## 2019-11-15 NOTE — ED ADULT NURSE NOTE - NSSUHOSCREENINGYN_ED_ALL_ED
Benefits, risks, and possible complications of procedure explained to patient/caregiver who verbalized understanding and gave written consent.
Yes - the patient is able to be screened

## 2019-11-15 NOTE — ED ADULT NURSE NOTE - NSIMPLEMENTINTERV_GEN_ALL_ED
Implemented All Fall with Harm Risk Interventions:  [place] to call system. Call bell, personal items and telephone within reach. Instruct patient to call for assistance. Room bathroom lighting operational. Non-slip footwear when patient is off stretcher. Physically safe environment: no spills, clutter or unnecessary equipment. Stretcher in lowest position, wheels locked, appropriate side rails in place. Provide visual cue, wrist band, yellow gown, etc. Monitor gait and stability. Monitor for mental status changes and reorient to person, place, and time. Review medications for side effects contributing to fall risk. Reinforce activity limits and safety measures with patient and family. Provide visual clues: red socks.

## 2019-11-15 NOTE — ED PROVIDER NOTE - PATIENT PORTAL LINK FT
You can access the FollowMyHealth Patient Portal offered by [facility] by registering at the following website: [email or web address]. By joining [employer]’s FollowMyHealth portal, you will also be able to view your health information using other applications (apps) compatible with our system.

## 2019-11-15 NOTE — ED ADULT TRIAGE NOTE - CHIEF COMPLAINT QUOTE
nose bleed  stared this PM. no active bleeding in triage nose bleed  stared this PM. no active bleeding in triage. patient also complains o left sided abd pain

## 2019-12-29 ENCOUNTER — INPATIENT (INPATIENT)
Facility: HOSPITAL | Age: 74
LOS: 1 days | Discharge: HOME | DRG: 871 | End: 2019-12-31
Attending: INTERNAL MEDICINE | Admitting: INTERNAL MEDICINE
Payer: COMMERCIAL

## 2019-12-29 VITALS
SYSTOLIC BLOOD PRESSURE: 153 MMHG | OXYGEN SATURATION: 100 % | TEMPERATURE: 98 F | RESPIRATION RATE: 22 BRPM | HEART RATE: 98 BPM | HEIGHT: 63 IN | DIASTOLIC BLOOD PRESSURE: 83 MMHG | WEIGHT: 179.9 LBS

## 2019-12-29 DIAGNOSIS — C95.90 LEUKEMIA, UNSPECIFIED NOT HAVING ACHIEVED REMISSION: ICD-10-CM

## 2019-12-29 DIAGNOSIS — A41.9 SEPSIS, UNSPECIFIED ORGANISM: ICD-10-CM

## 2019-12-29 DIAGNOSIS — Z90.710 ACQUIRED ABSENCE OF BOTH CERVIX AND UTERUS: Chronic | ICD-10-CM

## 2019-12-29 DIAGNOSIS — I10 ESSENTIAL (PRIMARY) HYPERTENSION: ICD-10-CM

## 2019-12-29 DIAGNOSIS — J18.9 PNEUMONIA, UNSPECIFIED ORGANISM: ICD-10-CM

## 2019-12-29 DIAGNOSIS — D64.9 ANEMIA, UNSPECIFIED: ICD-10-CM

## 2019-12-29 DIAGNOSIS — Z29.9 ENCOUNTER FOR PROPHYLACTIC MEASURES, UNSPECIFIED: ICD-10-CM

## 2019-12-29 LAB
ALBUMIN SERPL ELPH-MCNC: 4.1 G/DL — SIGNIFICANT CHANGE UP (ref 3.5–5)
ALP SERPL-CCNC: 51 U/L — SIGNIFICANT CHANGE UP (ref 40–120)
ALT FLD-CCNC: 23 U/L DA — SIGNIFICANT CHANGE UP (ref 10–60)
ANION GAP SERPL CALC-SCNC: 10 MMOL/L — SIGNIFICANT CHANGE UP (ref 5–17)
APPEARANCE UR: CLEAR — SIGNIFICANT CHANGE UP
APTT BLD: 25.5 SEC — LOW (ref 27.5–36.3)
AST SERPL-CCNC: 18 U/L — SIGNIFICANT CHANGE UP (ref 10–40)
BASOPHILS # BLD AUTO: 0 K/UL — SIGNIFICANT CHANGE UP (ref 0–0.2)
BASOPHILS NFR BLD AUTO: 0 % — SIGNIFICANT CHANGE UP (ref 0–2)
BILIRUB SERPL-MCNC: 2.6 MG/DL — HIGH (ref 0.2–1.2)
BILIRUB UR-MCNC: NEGATIVE — SIGNIFICANT CHANGE UP
BUN SERPL-MCNC: 18 MG/DL — SIGNIFICANT CHANGE UP (ref 7–18)
CALCIUM SERPL-MCNC: 9.1 MG/DL — SIGNIFICANT CHANGE UP (ref 8.4–10.5)
CHLORIDE SERPL-SCNC: 103 MMOL/L — SIGNIFICANT CHANGE UP (ref 96–108)
CO2 SERPL-SCNC: 27 MMOL/L — SIGNIFICANT CHANGE UP (ref 22–31)
COLOR SPEC: YELLOW — SIGNIFICANT CHANGE UP
CREAT SERPL-MCNC: 1.16 MG/DL — SIGNIFICANT CHANGE UP (ref 0.5–1.3)
DIFF PNL FLD: NEGATIVE — SIGNIFICANT CHANGE UP
EOSINOPHIL # BLD AUTO: 0 K/UL — SIGNIFICANT CHANGE UP (ref 0–0.5)
EOSINOPHIL NFR BLD AUTO: 0 % — SIGNIFICANT CHANGE UP (ref 0–6)
FLU A RESULT: SIGNIFICANT CHANGE UP
FLU A RESULT: SIGNIFICANT CHANGE UP
FLUAV AG NPH QL: SIGNIFICANT CHANGE UP
FLUBV AG NPH QL: SIGNIFICANT CHANGE UP
GLUCOSE BLDC GLUCOMTR-MCNC: 190 MG/DL — HIGH (ref 70–99)
GLUCOSE SERPL-MCNC: 134 MG/DL — HIGH (ref 70–99)
GLUCOSE UR QL: NEGATIVE — SIGNIFICANT CHANGE UP
HCT VFR BLD CALC: 27 % — LOW (ref 34.5–45)
HGB BLD-MCNC: 8.8 G/DL — LOW (ref 11.5–15.5)
INR BLD: 1.15 RATIO — SIGNIFICANT CHANGE UP (ref 0.88–1.16)
KETONES UR-MCNC: NEGATIVE — SIGNIFICANT CHANGE UP
LACTATE SERPL-SCNC: 3.4 MMOL/L — HIGH (ref 0.7–2)
LACTATE SERPL-SCNC: 4.3 MMOL/L — CRITICAL HIGH (ref 0.7–2)
LACTATE SERPL-SCNC: 6.9 MMOL/L — CRITICAL HIGH (ref 0.7–2)
LEUKOCYTE ESTERASE UR-ACNC: NEGATIVE — SIGNIFICANT CHANGE UP
LYMPHOCYTES # BLD AUTO: 1.16 K/UL — SIGNIFICANT CHANGE UP (ref 1–3.3)
LYMPHOCYTES # BLD AUTO: 15 % — SIGNIFICANT CHANGE UP (ref 13–44)
MCHC RBC-ENTMCNC: 32.6 GM/DL — SIGNIFICANT CHANGE UP (ref 32–36)
MCHC RBC-ENTMCNC: 32.6 PG — SIGNIFICANT CHANGE UP (ref 27–34)
MCV RBC AUTO: 100 FL — SIGNIFICANT CHANGE UP (ref 80–100)
MONOCYTES # BLD AUTO: 0.23 K/UL — SIGNIFICANT CHANGE UP (ref 0–0.9)
MONOCYTES NFR BLD AUTO: 3 % — SIGNIFICANT CHANGE UP (ref 2–14)
NEUTROPHILS # BLD AUTO: 6.24 K/UL — SIGNIFICANT CHANGE UP (ref 1.8–7.4)
NEUTROPHILS NFR BLD AUTO: 81 % — HIGH (ref 43–77)
NITRITE UR-MCNC: NEGATIVE — SIGNIFICANT CHANGE UP
PH UR: 6.5 — SIGNIFICANT CHANGE UP (ref 5–8)
PLATELET # BLD AUTO: 62 K/UL — LOW (ref 150–400)
POTASSIUM SERPL-MCNC: 3.5 MMOL/L — SIGNIFICANT CHANGE UP (ref 3.5–5.3)
POTASSIUM SERPL-SCNC: 3.5 MMOL/L — SIGNIFICANT CHANGE UP (ref 3.5–5.3)
PROT SERPL-MCNC: 7.5 G/DL — SIGNIFICANT CHANGE UP (ref 6–8.3)
PROT UR-MCNC: NEGATIVE — SIGNIFICANT CHANGE UP
PROTHROM AB SERPL-ACNC: 12.8 SEC — SIGNIFICANT CHANGE UP (ref 10–12.9)
RBC # BLD: 2.7 M/UL — LOW (ref 3.8–5.2)
RBC # FLD: 24 % — HIGH (ref 10.3–14.5)
RSV RESULT: SIGNIFICANT CHANGE UP
RSV RNA RESP QL NAA+PROBE: SIGNIFICANT CHANGE UP
SODIUM SERPL-SCNC: 140 MMOL/L — SIGNIFICANT CHANGE UP (ref 135–145)
SP GR SPEC: 1.01 — SIGNIFICANT CHANGE UP (ref 1.01–1.02)
UROBILINOGEN FLD QL: NEGATIVE — SIGNIFICANT CHANGE UP
WBC # BLD: 7.7 K/UL — SIGNIFICANT CHANGE UP (ref 3.8–10.5)
WBC # FLD AUTO: 7.7 K/UL — SIGNIFICANT CHANGE UP (ref 3.8–10.5)

## 2019-12-29 PROCEDURE — 99291 CRITICAL CARE FIRST HOUR: CPT

## 2019-12-29 PROCEDURE — 99223 1ST HOSP IP/OBS HIGH 75: CPT | Mod: GC

## 2019-12-29 PROCEDURE — 71045 X-RAY EXAM CHEST 1 VIEW: CPT | Mod: 26

## 2019-12-29 RX ORDER — PIPERACILLIN AND TAZOBACTAM 4; .5 G/20ML; G/20ML
3.38 INJECTION, POWDER, LYOPHILIZED, FOR SOLUTION INTRAVENOUS EVERY 12 HOURS
Refills: 0 | Status: DISCONTINUED | OUTPATIENT
Start: 2019-12-29 | End: 2019-12-31

## 2019-12-29 RX ORDER — CEFTRIAXONE 500 MG/1
1000 INJECTION, POWDER, FOR SOLUTION INTRAMUSCULAR; INTRAVENOUS ONCE
Refills: 0 | Status: COMPLETED | OUTPATIENT
Start: 2019-12-29 | End: 2019-12-29

## 2019-12-29 RX ORDER — CEFEPIME 1 G/1
2000 INJECTION, POWDER, FOR SOLUTION INTRAMUSCULAR; INTRAVENOUS ONCE
Refills: 0 | Status: DISCONTINUED | OUTPATIENT
Start: 2019-12-29 | End: 2019-12-29

## 2019-12-29 RX ORDER — CEFTRIAXONE 500 MG/1
1000 INJECTION, POWDER, FOR SOLUTION INTRAMUSCULAR; INTRAVENOUS EVERY 24 HOURS
Refills: 0 | Status: DISCONTINUED | OUTPATIENT
Start: 2019-12-29 | End: 2019-12-29

## 2019-12-29 RX ORDER — ACETAMINOPHEN 500 MG
650 TABLET ORAL ONCE
Refills: 0 | Status: COMPLETED | OUTPATIENT
Start: 2019-12-29 | End: 2019-12-29

## 2019-12-29 RX ORDER — FOLIC ACID 0.8 MG
1 TABLET ORAL DAILY
Refills: 0 | Status: DISCONTINUED | OUTPATIENT
Start: 2019-12-29 | End: 2019-12-31

## 2019-12-29 RX ORDER — AZITHROMYCIN 500 MG/1
500 TABLET, FILM COATED ORAL ONCE
Refills: 0 | Status: COMPLETED | OUTPATIENT
Start: 2019-12-29 | End: 2019-12-29

## 2019-12-29 RX ORDER — INSULIN LISPRO 100/ML
VIAL (ML) SUBCUTANEOUS
Refills: 0 | Status: DISCONTINUED | OUTPATIENT
Start: 2019-12-29 | End: 2019-12-30

## 2019-12-29 RX ORDER — AMLODIPINE BESYLATE 2.5 MG/1
5 TABLET ORAL DAILY
Refills: 0 | Status: DISCONTINUED | OUTPATIENT
Start: 2019-12-29 | End: 2019-12-29

## 2019-12-29 RX ORDER — METOPROLOL TARTRATE 50 MG
25 TABLET ORAL
Refills: 0 | Status: DISCONTINUED | OUTPATIENT
Start: 2019-12-29 | End: 2019-12-31

## 2019-12-29 RX ORDER — ENOXAPARIN SODIUM 100 MG/ML
40 INJECTION SUBCUTANEOUS DAILY
Refills: 0 | Status: DISCONTINUED | OUTPATIENT
Start: 2019-12-29 | End: 2019-12-30

## 2019-12-29 RX ORDER — TAMSULOSIN HYDROCHLORIDE 0.4 MG/1
0.4 CAPSULE ORAL AT BEDTIME
Refills: 0 | Status: DISCONTINUED | OUTPATIENT
Start: 2019-12-29 | End: 2019-12-31

## 2019-12-29 RX ORDER — METOPROLOL TARTRATE 50 MG
50 TABLET ORAL
Refills: 0 | Status: DISCONTINUED | OUTPATIENT
Start: 2019-12-29 | End: 2019-12-29

## 2019-12-29 RX ORDER — AZITHROMYCIN 500 MG/1
500 TABLET, FILM COATED ORAL EVERY 24 HOURS
Refills: 0 | Status: DISCONTINUED | OUTPATIENT
Start: 2019-12-29 | End: 2019-12-29

## 2019-12-29 RX ORDER — FERROUS SULFATE 325(65) MG
325 TABLET ORAL DAILY
Refills: 0 | Status: DISCONTINUED | OUTPATIENT
Start: 2019-12-29 | End: 2019-12-31

## 2019-12-29 RX ORDER — GABAPENTIN 400 MG/1
300 CAPSULE ORAL THREE TIMES A DAY
Refills: 0 | Status: DISCONTINUED | OUTPATIENT
Start: 2019-12-29 | End: 2019-12-31

## 2019-12-29 RX ORDER — ONDANSETRON 8 MG/1
8 TABLET, FILM COATED ORAL THREE TIMES A DAY
Refills: 0 | Status: DISCONTINUED | OUTPATIENT
Start: 2019-12-29 | End: 2019-12-31

## 2019-12-29 RX ORDER — PIPERACILLIN AND TAZOBACTAM 4; .5 G/20ML; G/20ML
3.38 INJECTION, POWDER, LYOPHILIZED, FOR SOLUTION INTRAVENOUS ONCE
Refills: 0 | Status: COMPLETED | OUTPATIENT
Start: 2019-12-29 | End: 2019-12-29

## 2019-12-29 RX ORDER — AZITHROMYCIN 500 MG/1
500 TABLET, FILM COATED ORAL EVERY 24 HOURS
Refills: 0 | Status: DISCONTINUED | OUTPATIENT
Start: 2019-12-29 | End: 2019-12-30

## 2019-12-29 RX ORDER — TRAMADOL HYDROCHLORIDE 50 MG/1
50 TABLET ORAL EVERY 6 HOURS
Refills: 0 | Status: DISCONTINUED | OUTPATIENT
Start: 2019-12-29 | End: 2019-12-31

## 2019-12-29 RX ORDER — SODIUM CHLORIDE 9 MG/ML
1000 INJECTION INTRAMUSCULAR; INTRAVENOUS; SUBCUTANEOUS
Refills: 0 | Status: DISCONTINUED | OUTPATIENT
Start: 2019-12-29 | End: 2019-12-29

## 2019-12-29 RX ORDER — SODIUM CHLORIDE 9 MG/ML
2500 INJECTION INTRAMUSCULAR; INTRAVENOUS; SUBCUTANEOUS ONCE
Refills: 0 | Status: COMPLETED | OUTPATIENT
Start: 2019-12-29 | End: 2019-12-29

## 2019-12-29 RX ORDER — SODIUM CHLORIDE 9 MG/ML
1000 INJECTION INTRAMUSCULAR; INTRAVENOUS; SUBCUTANEOUS
Refills: 0 | Status: DISCONTINUED | OUTPATIENT
Start: 2019-12-29 | End: 2019-12-30

## 2019-12-29 RX ADMIN — SODIUM CHLORIDE 100 MILLILITER(S): 9 INJECTION INTRAMUSCULAR; INTRAVENOUS; SUBCUTANEOUS at 20:37

## 2019-12-29 RX ADMIN — SODIUM CHLORIDE 2500 MILLILITER(S): 9 INJECTION INTRAMUSCULAR; INTRAVENOUS; SUBCUTANEOUS at 15:02

## 2019-12-29 RX ADMIN — CEFTRIAXONE 100 MILLIGRAM(S): 500 INJECTION, POWDER, FOR SOLUTION INTRAMUSCULAR; INTRAVENOUS at 12:30

## 2019-12-29 RX ADMIN — SODIUM CHLORIDE 65 MILLILITER(S): 9 INJECTION INTRAMUSCULAR; INTRAVENOUS; SUBCUTANEOUS at 18:11

## 2019-12-29 RX ADMIN — Medication 650 MILLIGRAM(S): at 13:21

## 2019-12-29 RX ADMIN — PIPERACILLIN AND TAZOBACTAM 200 GRAM(S): 4; .5 INJECTION, POWDER, LYOPHILIZED, FOR SOLUTION INTRAVENOUS at 18:11

## 2019-12-29 RX ADMIN — SODIUM CHLORIDE 2500 MILLILITER(S): 9 INJECTION INTRAMUSCULAR; INTRAVENOUS; SUBCUTANEOUS at 12:30

## 2019-12-29 RX ADMIN — Medication 650 MILLIGRAM(S): at 13:51

## 2019-12-29 RX ADMIN — TAMSULOSIN HYDROCHLORIDE 0.4 MILLIGRAM(S): 0.4 CAPSULE ORAL at 21:37

## 2019-12-29 RX ADMIN — GABAPENTIN 300 MILLIGRAM(S): 400 CAPSULE ORAL at 21:37

## 2019-12-29 RX ADMIN — AZITHROMYCIN 255 MILLIGRAM(S): 500 TABLET, FILM COATED ORAL at 12:43

## 2019-12-29 RX ADMIN — Medication 50 MILLIGRAM(S): at 17:10

## 2019-12-29 NOTE — ED ADULT NURSE NOTE - CHPI ED NUR SYMPTOMS NEG
no chills/no vomiting/no tingling/no fever/no decreased eating/drinking/no nausea/no pain/no weakness

## 2019-12-29 NOTE — ED PROVIDER NOTE - OBJECTIVE STATEMENT
73 y/o F with a PMHx of Leukemia, Anemia, HTN, Fibroids, MDS, Heart disease and a PSHx of Hysterectomy presents to ED c/o chills x last night with sob x this morning. Denies fever, cough, N/V/D. Reports last chemo session was 2 months ago. NKDA

## 2019-12-29 NOTE — H&P ADULT - PROBLEM SELECTOR PLAN 2
as above as above  Unclear if actual pneumonia as xray is unchanged since november and no other symptoms to indicate pna

## 2019-12-29 NOTE — ED PROVIDER NOTE - CARE PLAN
Principal Discharge DX:	Sepsis, due to unspecified organism, unspecified whether acute organ dysfunction present  Secondary Diagnosis:	Pneumonia of both lower lobes due to infectious organism

## 2019-12-29 NOTE — ED ADULT NURSE NOTE - OBJECTIVE STATEMENT
Pt came in for c/o mild difficulty breathing, dizziness & tremors since last night. Pt denies cp, fever, chills, cough, pain, leg swelling. Breathing unlabored, code sepsis initiated by MD Pina. RAPHAEL.

## 2019-12-29 NOTE — H&P ADULT - PROBLEM SELECTOR PLAN 6
IMPROVE VTE score: 4  Will manage with: Lovenox S/C    [ ] Previous VTE                                    3  [ ] Thrombophilia                                  2  [ ] Lower limb paralysis                        2  (unable to hold up >15 seconds)    [X ] Current Cancer (within 6 months)        2   [x] Immobilization > 24 hrs                    1  [ ] ICU/CCU stay > 24 hrs                      1  [x] Age > 60                                         1

## 2019-12-29 NOTE — H&P ADULT - ATTENDING COMMENTS
Patient seen and examined in ED. Case discussed with admitting resident  and PGY 2 Dr. Patel.     75 y/o female with PMHx MDS,  HTN, fibroids, rheumatic heart disease presented to ED for evaluation of subjective fevers an chills for 2 weeks.  Had tem 100.8 in ED. She is on active chemotherapy last session was 2 weeks ago, Follows up with  Dr. France (oncologist), Dr. Hendricks( bone marrow specialist) Denies any  cough, no diarrhea or abdominal pain, No dysuria, frequency, or hematuria.     Vital Signs Last 24 Hrs  T(C): 37.5 (29 Dec 2019 18:47), Max: 38.2 (29 Dec 2019 11:40)  T(F): 99.5 (29 Dec 2019 18:47), Max: 100.8 (29 Dec 2019 11:40)  HR: 72 (29 Dec 2019 18:47) (72 - 98)  BP: 127/64 (29 Dec 2019 18:47) (112/55 - 153/83)  BP(mean): --  RR: 18 (29 Dec 2019 18:47) (18 - 22)  SpO2: 100% (29 Dec 2019 18:47) (100% - 100%)    OE  pleasant AA female  Cushioned face  Clear to auscultation, no rales or rhonchi  Soft, NT, +BS  No pedal edema  Awake, alert, oriented 3       Labs                          8.8    7.70  )-----------( 62       ( 29 Dec 2019 12:43 )             27.0     12-29    140  |  103  |  18  ----------------------------<  134<H>  3.5   |  27  |  1.16    Ca    9.1      29 Dec 2019 12:43    TPro  7.5  /  Alb  4.1  /  TBili  2.6<H>  /  DBili  x   /  AST  18  /  ALT  23  /  AlkPhos  51  12-29    CXR- patchy areas infiltrates vs atelectasis, unchanged from prior  UA -Negative  Flu negative    A/P  Sepsis etiology is unclear, possible community acquired PNA in a immune compromised patient - will start Zosyn for broader coverage and azithromycin for atypical coverage, check urine legionella, and Strep PNA, check viral panel, Procalcitonin, F/u urine and blood cultures, ID consult Dr. Nicholson  Repeat lactic acid now, adjust  IV hydration accordingly , had received 2.5 L of NS in ED  MDS/ Leukemia- need to verify with exact diagnosis and chemotherapy regimen from her Oncologist   Anemia secondary to MDS  HTN  Cushingoid appearance; on Prednisone 80 mg QD; continue for now, Check with Oncologist, Check FS AC &HS, add protonix for PPX    DVT ppx Patient seen and examined in ED. Case discussed with admitting resident  and PGY 2 Dr. Patel.     73 y/o female with PMHx MDS,  HTN, fibroids, rheumatic heart disease presented to ED for evaluation of subjective fevers an chills for 2 weeks.  Had tem 100.8 in ED. She is on active chemotherapy last session was 2 weeks ago, Follows up with  Dr. France (oncologist), Dr. Hendricks( bone marrow specialist) Denies any  cough, no diarrhea or abdominal pain, No dysuria, frequency, or hematuria.     Vital Signs Last 24 Hrs  T(C): 37.5 (29 Dec 2019 18:47), Max: 38.2 (29 Dec 2019 11:40)  T(F): 99.5 (29 Dec 2019 18:47), Max: 100.8 (29 Dec 2019 11:40)  HR: 72 (29 Dec 2019 18:47) (72 - 98)  BP: 127/64 (29 Dec 2019 18:47) (112/55 - 153/83)  BP(mean): --  RR: 18 (29 Dec 2019 18:47) (18 - 22)  SpO2: 100% (29 Dec 2019 18:47) (100% - 100%)    OE  pleasant AA female  Cushioned face  Clear to auscultation, no rales or rhonchi  Soft, NT, +BS  No pedal edema  Awake, alert, oriented 3       Labs                          8.8    7.70  )-----------( 62       ( 29 Dec 2019 12:43 )             27.0     12-29    140  |  103  |  18  ----------------------------<  134<H>  3.5   |  27  |  1.16    Ca    9.1      29 Dec 2019 12:43    TPro  7.5  /  Alb  4.1  /  TBili  2.6<H>  /  DBili  x   /  AST  18  /  ALT  23  /  AlkPhos  51  12-29    CXR- patchy areas infiltrates vs atelectasis, unchanged from prior  UA -Negative  Flu negative    A/P  Sepsis etiology is unclear, possible community acquired PNA in a immune compromised patient - will start Zosyn for broader coverage and azithromycin for atypical coverage, check urine legionella, and Strep PNA, check viral panel, Procalcitonin, F/u urine and blood cultures, ID consult Dr. 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

## 2019-12-29 NOTE — H&P ADULT - HISTORY OF PRESENT ILLNESS
75 y/o female with PMHx leukemia, anemia, HTN, fibroids, rheumatic heart disease, MDS, PSHx hysterectomy presenting due to chills and subjective fevers for the past 2 weeks. She denies any associated symptoms. She is currently on chemotherapy for leukemia with outpatient oncologist, Dr. France. Last treatment was 2 weeks ago. She does not currently have a date for her next treatment but was planned to follow up with bone marrow specialist, Dr. Spence, next week. She denies any shortness of breath, chest pain, cough, sputum production, nausea, vomiting. Patent

## 2019-12-29 NOTE — H&P ADULT - PROBLEM SELECTOR PLAN 4
Last chemotherapy 2 weeks ago  Dr. France - outpatient oncologist  Dr. Hendricks - bone marrow specialist Last chemotherapy 2 weeks ago  Dr. France - outpatient oncologist  Dr. Hendricks - bone marrow specialist  PRIMARY TEAM TO CONTACT ONCOLOGIST TO CONFIRM CHEMOTHERAPY PLAN

## 2019-12-29 NOTE — ED PROVIDER NOTE - CLINICAL SUMMARY MEDICAL DECISION MAKING FREE TEXT BOX
Patient with sepsis due to pneumonia. Requires inpatient admission for IV fluids and IV antibiotics.

## 2019-12-29 NOTE — H&P ADULT - ASSESSMENT
Pt ia 75 yo F with history of leukemia on chemotherapy, anemia, hypertension, fibroids, rheumatic heart disease, hysterectomy presenting due to chills/subjective fevers for 2 weeks    CXR shows B/l lower lobe infiltrates concerning for pneumonia though mostly unchanged from xray in 11/19.     Febrile to 100.8 in ED  Lactate 4.3    Admitted for management of sepsis 2/2 community acquired pneumonia

## 2019-12-29 NOTE — H&P ADULT - PROBLEM SELECTOR PLAN 1
Presented with fever 100.8, lactate 4.3  CXR shows b/l patchy infiltrates unchanged from november  UA negative  No associated symptoms such as cough, sob, cp, n/v, c/d.   Patient lives at home and has not been hospitalized recently.   Will treat as community acquired pna  c/w rocephin / zithromax  f/u am labs, legionella, strep, procalc  trend lactate until normalized  monitor vitals  Pt hemodynamically stable at present Presented with fever 100.8, lactate 4.3  CXR shows b/l patchy infiltrates unchanged from november  UA negative  No associated symptoms such as cough, sob, cp, n/v, c/d.   Patient lives at home and has not been hospitalized recently, but considering chronic prednisone and active malignancy, will treat as immunocompromised  Start zosyn empirically. No recent hospitalizations, so will withhold vanco  f/u am labs, legionella, strep, procalc  f/u echo  trend lactate until normalized - gentle IVF for now  monitor vitals  Pt hemodynamically stable at present  ID - Dr. Murphy

## 2019-12-29 NOTE — H&P ADULT - NSHPPHYSICALEXAM_GEN_ALL_CORE
Vital Signs Last 24 Hrs  T(C): 37.1 (29 Dec 2019 15:03), Max: 38.2 (29 Dec 2019 11:40)  T(F): 98.7 (29 Dec 2019 15:03), Max: 100.8 (29 Dec 2019 11:40)  HR: 75 (29 Dec 2019 15:03) (75 - 98)  BP: 112/55 (29 Dec 2019 15:03) (112/55 - 153/83)  BP(mean): --  RR: 20 (29 Dec 2019 15:03) (20 - 22)  SpO2: 100% (29 Dec 2019 15:03) (100% - 100%)    PHYSICAL EXAM:  GENERAL: obese NAD, lying in bed comfortably  HEAD:  Atraumatic, Normocephalic  EYES: EOMI, PERRLA, conjunctiva and sclera clear  ENT: Moist mucous membranes  CHEST/LUNG: mild crackles in b/l lower lung fields, Unlabored respirations  HEART: Regular rate and rhythm; No murmurs, rubs, or gallops  ABDOMEN: Bowel sounds present; Soft, Nontender, Nondistended. No hepatosplenomegally  EXTREMITIES:  2+ Peripheral Pulses, brisk capillary refill. No clubbing, cyanosis, or edema  NERVOUS SYSTEM:  Alert & Oriented X3, speech clear. No deficits   MSK: FROM all 4 extremities, full and equal strength  SKIN: No rashes or lesions

## 2019-12-30 DIAGNOSIS — E87.6 HYPOKALEMIA: ICD-10-CM

## 2019-12-30 LAB
24R-OH-CALCIDIOL SERPL-MCNC: 17.9 NG/ML — LOW (ref 30–80)
ANION GAP SERPL CALC-SCNC: 9 MMOL/L — SIGNIFICANT CHANGE UP (ref 5–17)
BLD GP AB SCN SERPL QL: SIGNIFICANT CHANGE UP
BUN SERPL-MCNC: 14 MG/DL — SIGNIFICANT CHANGE UP (ref 7–18)
CALCIUM SERPL-MCNC: 8.2 MG/DL — LOW (ref 8.4–10.5)
CHLORIDE SERPL-SCNC: 111 MMOL/L — HIGH (ref 96–108)
CHOLEST SERPL-MCNC: 149 MG/DL — SIGNIFICANT CHANGE UP (ref 10–199)
CO2 SERPL-SCNC: 25 MMOL/L — SIGNIFICANT CHANGE UP (ref 22–31)
CREAT SERPL-MCNC: 0.99 MG/DL — SIGNIFICANT CHANGE UP (ref 0.5–1.3)
CULTURE RESULTS: SIGNIFICANT CHANGE UP
FERRITIN SERPL-MCNC: 1258 NG/ML — HIGH (ref 15–150)
FOLATE SERPL-MCNC: >20 NG/ML — SIGNIFICANT CHANGE UP
GLUCOSE BLDC GLUCOMTR-MCNC: 136 MG/DL — HIGH (ref 70–99)
GLUCOSE BLDC GLUCOMTR-MCNC: 196 MG/DL — HIGH (ref 70–99)
GLUCOSE SERPL-MCNC: 100 MG/DL — HIGH (ref 70–99)
HBA1C BLD-MCNC: 4.7 % — SIGNIFICANT CHANGE UP (ref 4–5.6)
HCT VFR BLD CALC: 22.4 % — LOW (ref 34.5–45)
HCT VFR BLD CALC: 25.6 % — LOW (ref 34.5–45)
HDLC SERPL-MCNC: 44 MG/DL — LOW
HGB BLD-MCNC: 7.2 G/DL — LOW (ref 11.5–15.5)
HGB BLD-MCNC: 8.1 G/DL — LOW (ref 11.5–15.5)
IRON SATN MFR SERPL: 42 % — SIGNIFICANT CHANGE UP (ref 15–50)
IRON SATN MFR SERPL: 78 UG/DL — SIGNIFICANT CHANGE UP (ref 40–150)
LACTATE SERPL-SCNC: 2 MMOL/L — SIGNIFICANT CHANGE UP (ref 0.7–2)
LACTATE SERPL-SCNC: 3.2 MMOL/L — HIGH (ref 0.7–2)
LEGIONELLA AG UR QL: NEGATIVE — SIGNIFICANT CHANGE UP
LIPID PNL WITH DIRECT LDL SERPL: 55 MG/DL — SIGNIFICANT CHANGE UP
MAGNESIUM SERPL-MCNC: 2.3 MG/DL — SIGNIFICANT CHANGE UP (ref 1.6–2.6)
MCHC RBC-ENTMCNC: 31.6 GM/DL — LOW (ref 32–36)
MCHC RBC-ENTMCNC: 32 PG — SIGNIFICANT CHANGE UP (ref 27–34)
MCHC RBC-ENTMCNC: 32.1 GM/DL — SIGNIFICANT CHANGE UP (ref 32–36)
MCHC RBC-ENTMCNC: 32.6 PG — SIGNIFICANT CHANGE UP (ref 27–34)
MCV RBC AUTO: 101.2 FL — HIGH (ref 80–100)
MCV RBC AUTO: 101.4 FL — HIGH (ref 80–100)
NRBC # BLD: 42 /100 WBCS — HIGH (ref 0–0)
NRBC # BLD: 55 /100 WBCS — HIGH (ref 0–0)
PHOSPHATE SERPL-MCNC: 3.9 MG/DL — SIGNIFICANT CHANGE UP (ref 2.5–4.5)
PLATELET # BLD AUTO: 55 K/UL — LOW (ref 150–400)
PLATELET # BLD AUTO: 75 K/UL — LOW (ref 150–400)
POTASSIUM SERPL-MCNC: 3.4 MMOL/L — LOW (ref 3.5–5.3)
POTASSIUM SERPL-SCNC: 3.4 MMOL/L — LOW (ref 3.5–5.3)
PROCALCITONIN SERPL-MCNC: 0.4 NG/ML — HIGH (ref 0.02–0.1)
RAPID RVP RESULT: SIGNIFICANT CHANGE UP
RBC # BLD: 2.21 M/UL — LOW (ref 3.8–5.2)
RBC # BLD: 2.53 M/UL — LOW (ref 3.8–5.2)
RBC # FLD: 23.4 % — HIGH (ref 10.3–14.5)
RBC # FLD: 23.4 % — HIGH (ref 10.3–14.5)
SODIUM SERPL-SCNC: 145 MMOL/L — SIGNIFICANT CHANGE UP (ref 135–145)
SPECIMEN SOURCE: SIGNIFICANT CHANGE UP
TIBC SERPL-MCNC: 185 UG/DL — LOW (ref 250–450)
TOTAL CHOLESTEROL/HDL RATIO MEASUREMENT: 3.4 RATIO — SIGNIFICANT CHANGE UP (ref 3.3–7.1)
TRANSFERRIN SERPL-MCNC: 134 MG/DL — LOW (ref 200–360)
TRIGL SERPL-MCNC: 251 MG/DL — HIGH (ref 10–149)
TSH SERPL-MCNC: 1.35 UU/ML — SIGNIFICANT CHANGE UP (ref 0.34–4.82)
UIBC SERPL-MCNC: 107 UG/DL — LOW (ref 110–370)
VIT B12 SERPL-MCNC: 521 PG/ML — SIGNIFICANT CHANGE UP (ref 232–1245)
WBC # BLD: 4.2 K/UL — SIGNIFICANT CHANGE UP (ref 3.8–10.5)
WBC # BLD: 5.72 K/UL — SIGNIFICANT CHANGE UP (ref 3.8–10.5)
WBC # FLD AUTO: 4.2 K/UL — SIGNIFICANT CHANGE UP (ref 3.8–10.5)
WBC # FLD AUTO: 5.72 K/UL — SIGNIFICANT CHANGE UP (ref 3.8–10.5)

## 2019-12-30 PROCEDURE — 99233 SBSQ HOSP IP/OBS HIGH 50: CPT | Mod: GC

## 2019-12-30 RX ORDER — PANTOPRAZOLE SODIUM 20 MG/1
40 TABLET, DELAYED RELEASE ORAL
Refills: 0 | Status: DISCONTINUED | OUTPATIENT
Start: 2019-12-30 | End: 2019-12-31

## 2019-12-30 RX ORDER — AMLODIPINE BESYLATE 2.5 MG/1
0 TABLET ORAL
Qty: 90 | Refills: 0 | DISCHARGE

## 2019-12-30 RX ORDER — METOPROLOL TARTRATE 50 MG
1 TABLET ORAL
Qty: 0 | Refills: 0 | DISCHARGE

## 2019-12-30 RX ORDER — ALENDRONATE SODIUM 70 MG/1
0 TABLET ORAL
Qty: 4 | Refills: 0 | DISCHARGE

## 2019-12-30 RX ORDER — POTASSIUM CHLORIDE 20 MEQ
40 PACKET (EA) ORAL ONCE
Refills: 0 | Status: COMPLETED | OUTPATIENT
Start: 2019-12-30 | End: 2019-12-30

## 2019-12-30 RX ORDER — ERGOCALCIFEROL 1.25 MG/1
50000 CAPSULE ORAL
Refills: 0 | Status: DISCONTINUED | OUTPATIENT
Start: 2019-12-30 | End: 2019-12-31

## 2019-12-30 RX ORDER — POLYETHYLENE GLYCOL 3350 17 G/17G
17 POWDER, FOR SOLUTION ORAL ONCE
Refills: 0 | Status: COMPLETED | OUTPATIENT
Start: 2019-12-30 | End: 2019-12-30

## 2019-12-30 RX ADMIN — Medication 1 MILLIGRAM(S): at 11:49

## 2019-12-30 RX ADMIN — Medication 25 MILLIGRAM(S): at 18:04

## 2019-12-30 RX ADMIN — AZITHROMYCIN 255 MILLIGRAM(S): 500 TABLET, FILM COATED ORAL at 11:50

## 2019-12-30 RX ADMIN — POLYETHYLENE GLYCOL 3350 17 GRAM(S): 17 POWDER, FOR SOLUTION ORAL at 11:50

## 2019-12-30 RX ADMIN — Medication 325 MILLIGRAM(S): at 11:49

## 2019-12-30 RX ADMIN — Medication 25 MILLIGRAM(S): at 05:42

## 2019-12-30 RX ADMIN — PIPERACILLIN AND TAZOBACTAM 25 GRAM(S): 4; .5 INJECTION, POWDER, LYOPHILIZED, FOR SOLUTION INTRAVENOUS at 05:42

## 2019-12-30 RX ADMIN — PIPERACILLIN AND TAZOBACTAM 25 GRAM(S): 4; .5 INJECTION, POWDER, LYOPHILIZED, FOR SOLUTION INTRAVENOUS at 18:04

## 2019-12-30 RX ADMIN — ERGOCALCIFEROL 50000 UNIT(S): 1.25 CAPSULE ORAL at 14:58

## 2019-12-30 RX ADMIN — GABAPENTIN 300 MILLIGRAM(S): 400 CAPSULE ORAL at 14:21

## 2019-12-30 RX ADMIN — TAMSULOSIN HYDROCHLORIDE 0.4 MILLIGRAM(S): 0.4 CAPSULE ORAL at 22:01

## 2019-12-30 RX ADMIN — Medication 80 MILLIGRAM(S): at 05:42

## 2019-12-30 RX ADMIN — GABAPENTIN 300 MILLIGRAM(S): 400 CAPSULE ORAL at 05:41

## 2019-12-30 RX ADMIN — GABAPENTIN 300 MILLIGRAM(S): 400 CAPSULE ORAL at 22:01

## 2019-12-30 RX ADMIN — Medication 40 MILLIEQUIVALENT(S): at 11:50

## 2019-12-30 NOTE — PROGRESS NOTE ADULT - SUBJECTIVE AND OBJECTIVE BOX
PGY1 Note discussed with supervising resident and primary attending.    Patient is a 74y old  Female who presents with a chief complaint of Chills (30 Dec 2019 10:01)      INTERVAL HPI/OVERNIGHT EVENTS:    MEDICATIONS  (STANDING):  azithromycin  IVPB 500 milliGRAM(s) IV Intermittent every 24 hours  enoxaparin Injectable 40 milliGRAM(s) SubCutaneous daily  ferrous    sulfate 325 milliGRAM(s) Oral daily  folic acid 1 milliGRAM(s) Oral daily  gabapentin 300 milliGRAM(s) Oral three times a day  insulin lispro (HumaLOG) corrective regimen sliding scale   SubCutaneous three times a day before meals  metoprolol tartrate 25 milliGRAM(s) Oral two times a day  pantoprazole    Tablet 40 milliGRAM(s) Oral before breakfast  piperacillin/tazobactam IVPB.. 3.375 Gram(s) IV Intermittent every 12 hours  potassium chloride    Tablet ER 40 milliEquivalent(s) Oral once  predniSONE   Tablet 80 milliGRAM(s) Oral daily  sodium chloride 0.9%. 1000 milliLiter(s) (100 mL/Hr) IV Continuous <Continuous>  tamsulosin 0.4 milliGRAM(s) Oral at bedtime    MEDICATIONS  (PRN):  ondansetron   Disintegrating Tablet 8 milliGRAM(s) Oral three times a day PRN Nausea and/or Vomiting  polyethylene glycol 3350 17 Gram(s) Oral once PRN Constipation  traMADol 50 milliGRAM(s) Oral every 6 hours PRN Severe Pain (7 - 10)      Allergies    No Known Allergies    Intolerances        REVIEW OF SYSTEMS:  CONSTITUTIONAL: No fever, weight loss, or fatigue  RESPIRATORY: No cough, wheezing, chills or hemoptysis; No shortness of breath  CARDIOVASCULAR: No chest pain, palpitations, dizziness, or leg swelling  GASTROINTESTINAL: No abdominal or epigastric pain. No nausea, vomiting, or hematemesis; No diarrhea or constipation. No melena or hematochezia.  NEUROLOGICAL: No headaches, memory loss, loss of strength, numbness, or tremors  SKIN: No itching, burning, rashes, or lesions     Vital Signs Last 24 Hrs  T(C): 36.8 (30 Dec 2019 05:10), Max: 38.2 (29 Dec 2019 11:40)  T(F): 98.3 (30 Dec 2019 05:10), Max: 100.8 (29 Dec 2019 11:40)  HR: 68 (30 Dec 2019 05:10) (68 - 98)  BP: 131/61 (30 Dec 2019 05:10) (106/50 - 153/83)  BP(mean): --  RR: 17 (30 Dec 2019 05:10) (17 - 22)  SpO2: 99% (30 Dec 2019 05:10) (99% - 100%)    PHYSICAL EXAM:  GENERAL: NAD, well-groomed, well-developed  HEAD:  Atraumatic, Normocephalic  EYES: EOMI, PERRLA, conjunctiva and sclera clear  NECK: Supple, No JVD, Normal thyroid  CHEST/LUNG: Clear to percussion bilaterally; No wheezing, or rubs,  sound on b/l LL  HEART: Regular rate and rhythm; No murmurs, rubs, or gallops  ABDOMEN: Soft, Nontender, Nondistended; Bowel sounds present  NERVOUS SYSTEM:  Alert & Oriented X3, Good concentration; Motor Strength 5/5 B/L   EXTREMITIES:  2+ Peripheral Pulses, No clubbing, cyanosis, (+) R ankle swelling (chronic as per Pt)  SKIN;    LABS:                        7.2    5.72  )-----------( 55       ( 30 Dec 2019 06:47 )             22.4     12-30    145  |  111<H>  |  14  ----------------------------<  100<H>  3.4<L>   |  25  |  0.99    Ca    8.2<L>      30 Dec 2019 06:47  Phos  3.9     12-30  Mg     2.3     12-30    TPro  7.5  /  Alb  4.1  /  TBili  2.6<H>  /  DBili  x   /  AST  18  /  ALT  23  /  AlkPhos  51  12-29    PT/INR - ( 29 Dec 2019 12:43 )   PT: 12.8 sec;   INR: 1.15 ratio         PTT - ( 29 Dec 2019 12:43 )  PTT:25.5 sec  Urinalysis Basic - ( 29 Dec 2019 18:35 )    Color: Yellow / Appearance: Clear / S.010 / pH: x  Gluc: x / Ketone: Negative  / Bili: Negative / Urobili: Negative   Blood: x / Protein: Negative / Nitrite: Negative   Leuk Esterase: Negative / RBC: x / WBC x   Sq Epi: x / Non Sq Epi: x / Bacteria: x      CAPILLARY BLOOD GLUCOSE      POCT Blood Glucose.: 136 mg/dL (30 Dec 2019 08:09)  POCT Blood Glucose.: 190 mg/dL (29 Dec 2019 21:32)  POCT Blood Glucose.: 113 mg/dL (29 Dec 2019 11:24)      RADIOLOGY & ADDITIONAL TESTS:    Imaging Personally Reviewed:  [ ] YES  [ ] NO    Consultant(s) Notes Reviewed:  [ ] YES  [ ] NO

## 2019-12-30 NOTE — PROGRESS NOTE ADULT - PROBLEM SELECTOR PLAN 4
Likely anemia secondary to MDS  Hgb 8.8 on admission, at baseline  Hb 7.2 today  f/u anemia panel  f/u repeat Hb

## 2019-12-30 NOTE — PROGRESS NOTE ADULT - PROBLEM SELECTOR PLAN 5
Last chemotherapy 2 weeks ago  Dr. France - outpatient oncologist  Dr. Hendricks - bone marrow specialist  will  CONTACT ONCOLOGIST TO CONFIRM CHEMOTHERAPY PLAN

## 2019-12-30 NOTE — PROGRESS NOTE ADULT - ASSESSMENT
Pt ia 75 yo F with history of leukemia on chemotherapy, anemia, hypertension, fibroids, rheumatic heart disease, hysterectomy presenting due to chills/subjective fevers for 2 weeks    CXR shows B/l lower lobe infiltrates concerning for pneumonia though mostly unchanged from xray in 11/19.     Febrile to 100.8 in ED  Lactate 4.3    Admitted for management of sepsis 2/2 community acquired pneumonia   will  CONTACT ONCOLOGIST TO CONFIRM CHEMOTHERAPY PLAN Pt ia 75 yo F with history of leukemia on chemotherapy, anemia, hypertension, fibroids, rheumatic heart disease, hysterectomy presenting due to chills/subjective fevers for 2 weeks    CXR shows B/l lower lobe infiltrates concerning for pneumonia though mostly unchanged from xray in 11/19.     Febrile to 100.8 in ED  Lactate 4.3    Admitted for management of sepsis 2/2 community acquired pneumonia

## 2019-12-30 NOTE — CHART NOTE - NSCHARTNOTEFT_GEN_A_CORE
Called pt's oncologist Nae Watts at [facility] ([phone]) and obtained pt's medical history and treatment.  Pt was diagnosed as Myelodysplastic syndromes (MDS) on 4/2019 and Autoimmune hemolytic anemia (AIHA), treated w/ azacitidine (Vidaza) once a month and predonisone 60mg daily.  Pt was initially put on predonisone 80mg and recently reduced to 60mg by Dr. France.  Pt's baseline Hb is 7-9. (Currently at her baseline.)

## 2019-12-30 NOTE — CONSULT NOTE ADULT - SUBJECTIVE AND OBJECTIVE BOX
HPI:  ID consult was called to evaluate this patient from home for pneumonia and fever.     As per H&P:  73 y/o female with PMHx leukemia, anemia, HTN, fibroids, rheumatic heart disease, MDS, PSHx hysterectomy presenting due to chills and subjective fevers for the past 2 weeks. She denies any associated symptoms. She is currently on chemotherapy for leukemia with outpatient oncologist, Dr. France. Last treatment was 2 weeks ago. She does not currently have a date for her next treatment but was planned to follow up with bone marrow specialist, Dr. Spence, next week. She denies any shortness of breath, chest pain, cough, sputum production, nausea, vomiting. (29 Dec 2019 16:15)    REVIEW OF SYSTEMS:  [  ] Not able to illicit  General:	  Chest:	  GI:	  :  Skin:	  Musculoskeletal:	  Neuro:    PAST MEDICAL & SURGICAL HISTORY:  Anemia  MDS (myelodysplastic syndrome)  Fibroids  H/O rheumatic heart disease  Essential hypertension  H/O abdominal hysterectomy    ALLERGIES: No Known Allergies    MEDS:  azithromycin  IVPB 500 milliGRAM(s) IV Intermittent every 24 hours  enoxaparin Injectable 40 milliGRAM(s) SubCutaneous daily  ferrous    sulfate 325 milliGRAM(s) Oral daily  folic acid 1 milliGRAM(s) Oral daily  gabapentin 300 milliGRAM(s) Oral three times a day  insulin lispro (HumaLOG) corrective regimen sliding scale   SubCutaneous three times a day before meals  metoprolol tartrate 25 milliGRAM(s) Oral two times a day  ondansetron   Disintegrating Tablet 8 milliGRAM(s) Oral three times a day PRN  pantoprazole    Tablet 40 milliGRAM(s) Oral before breakfast  piperacillin/tazobactam IVPB.. 3.375 Gram(s) IV Intermittent every 12 hours  potassium chloride    Tablet ER 40 milliEquivalent(s) Oral once  predniSONE   Tablet 80 milliGRAM(s) Oral daily  sodium chloride 0.9%. 1000 milliLiter(s) IV Continuous <Continuous>  tamsulosin 0.4 milliGRAM(s) Oral at bedtime  traMADol 50 milliGRAM(s) Oral every 6 hours PRN    SOCIAL HISTORY:  Smoker:  none     FAMILY HISTORY:  FH: breast cancer: sister    VITALS:  Vital Signs Last 24 Hrs  T(C): 36.8 (30 Dec 2019 05:10), Max: 38.2 (29 Dec 2019 11:40)  T(F): 98.3 (30 Dec 2019 05:10), Max: 100.8 (29 Dec 2019 11:40)  HR: 68 (30 Dec 2019 05:10) (68 - 98)  BP: 131/61 (30 Dec 2019 05:10) (106/50 - 153/83)  BP(mean): --  RR: 17 (30 Dec 2019 05:10) (17 - 22)  SpO2: 99% (30 Dec 2019 05:10) (99% - 100%)      PHYSICAL EXAM:  Constitutional:  HEENT:  Neck:  Respiratory:  Cardiovascular:  Gastrointestinal:  Extremities:  Skin:  Ortho:  Neuro:      LABS/DIAGNOSTIC TESTS:                        7.2    5.72  )-----------( 55       ( 30 Dec 2019 06:47 )             22.4     WBC Count: 5.72 K/uL ( @ 06:47)  WBC Count: 7.70 K/uL ( @ 12:43)        145  |  111<H>  |  14  ----------------------------<  100<H>  3.4<L>   |  25  |  0.99    Ca    8.2<L>      30 Dec 2019 06:47  Phos  3.9       Mg     2.3         TPro  7.5  /  Alb  4.1  /  TBili  2.6<H>  /  DBili  x   /  AST  18  /  ALT  23  /  AlkPhos  51      Urinalysis Basic - ( 29 Dec 2019 18:35 )    Color: Yellow / Appearance: Clear / S.010 / pH: x  Gluc: x / Ketone: Negative  / Bili: Negative / Urobili: Negative   Blood: x / Protein: Negative / Nitrite: Negative   Leuk Esterase: Negative / RBC: x / WBC x   Sq Epi: x / Non Sq Epi: x / Bacteria: x      LIVER FUNCTIONS - ( 29 Dec 2019 12:43 )  Alb: 4.1 g/dL / Pro: 7.5 g/dL / ALK PHOS: 51 U/L / ALT: 23 U/L DA / AST: 18 U/L / GGT: x           PT/INR - ( 29 Dec 2019 12:43 )   PT: 12.8 sec;   INR: 1.15 ratio    PTT - ( 29 Dec 2019 12:43 )  PTT:25.5 sec    Lactate, Blood: 2.0 mmol/L ( @ 06:39)  Lactate, Blood: 3.2 mmol/L ( @ 00:02)  Lactate, Blood: 6.9 mmol/L ( @ 19:57)  Lactate, Blood: 3.4 mmol/L ( @ 14:56)  Lactate, Blood: 4.3 mmol/L ( @ 12:43)    FLU A B RSV Detection by PCR (19 @ 13:31)    Flu A Result: [facility]: The Flu A B RSV assay is a Real-Time PCR test for the qualitative  detection and differentiation of Influenza A, Influenza B, and  Respiratory Syncytial Virus on nasopharyngeal swabs. The results should  be interpreted in the context of all clinical and laboratory findings.    Flu B Result: [facility]    RSV Result: [facility]        CULTURES:    Sputum culture - pending    BC - pending    UC - pending     .Urine   @ 10:13   <10,000 CFU/mL Normal Urogenital Marcelle  --  --        RADIOLOGY:  EXAM:  XR CHEST PORTABLE URGENT 1V                            PROCEDURE DATE:  2019          INTERPRETATION:  Clinical history: 74-year-old female, fever, dyspnea.    Single expiratory/kyphotic view is compared to radiograph/abdominal CT 11/15/2019 and demonstrate a normal cardiac silhouette and normal pulmonary vasculature with no gross consolidation, effusion, pneumothorax or osseous finding.    Curvilinear opacities in the lower lobes, left greater than right suspicious for patchy infiltrate/atelectasis, grossly unchanged.    Impression    Patchy areas of atelectasis/possible infiltrate in the lower lobes, grossly unchanged. Recommend correlation with physical findings however HPI:  ID consult was called to evaluate this patient from home for pneumonia and fever. At present, patient is doing well and only c/o tremors at times. Admits that she never had chills and that it was always tremors, but patient is also not a good historian. No recurrent fevers for the past 24 hours. Suspicion of pneumonia arose based on CXR results. BC are negative. Dc home on po antibiotics tomorrow if no other complications.     As per H&P:  75 y/o female with PMHx leukemia, anemia, HTN, fibroids, rheumatic heart disease, MDS, PSHx hysterectomy presenting due to chills and subjective fevers for the past 2 weeks. She denies any associated symptoms. She is currently on chemotherapy for leukemia with outpatient oncologist, Dr. France. Last treatment was 2 weeks ago. She does not currently have a date for her next treatment but was planned to follow up with bone marrow specialist, Dr. Spence, next week. She denies any shortness of breath, chest pain, cough, sputum production, nausea, vomiting. (29 Dec 2019 16:15)    REVIEW OF SYSTEMS:  [  ] Not able to illicit  General: no fevers no malaise   Chest: no cough no sob no CP  GI: no nvd no abdominal pain  : no urinary symptoms   Skin: no rashes no cyanosis  Musculoskeletal: no trauma no LBP  Neuro: no ha's no dizziness +tremors    PAST MEDICAL & SURGICAL HISTORY:  Anemia  MDS (myelodysplastic syndrome)  Fibroids  H/O rheumatic heart disease  Essential hypertension  H/O abdominal hysterectomy    ALLERGIES: No Known Allergies    MEDS:  azithromycin  IVPB 500 milliGRAM(s) IV Intermittent every 24 hours  enoxaparin Injectable 40 milliGRAM(s) SubCutaneous daily  ferrous    sulfate 325 milliGRAM(s) Oral daily  folic acid 1 milliGRAM(s) Oral daily  gabapentin 300 milliGRAM(s) Oral three times a day  insulin lispro (HumaLOG) corrective regimen sliding scale   SubCutaneous three times a day before meals  metoprolol tartrate 25 milliGRAM(s) Oral two times a day  ondansetron   Disintegrating Tablet 8 milliGRAM(s) Oral three times a day PRN  pantoprazole    Tablet 40 milliGRAM(s) Oral before breakfast  piperacillin/tazobactam IVPB.. 3.375 Gram(s) IV Intermittent every 12 hours  potassium chloride    Tablet ER 40 milliEquivalent(s) Oral once  predniSONE   Tablet 80 milliGRAM(s) Oral daily  sodium chloride 0.9%. 1000 milliLiter(s) IV Continuous <Continuous>  tamsulosin 0.4 milliGRAM(s) Oral at bedtime  traMADol 50 milliGRAM(s) Oral every 6 hours PRN    SOCIAL HISTORY:  Smoker:  none     FAMILY HISTORY:  FH: breast cancer: sister    VITALS:  Vital Signs Last 24 Hrs  T(C): 36.8 (30 Dec 2019 05:10), Max: 38.2 (29 Dec 2019 11:40)  T(F): 98.3 (30 Dec 2019 05:10), Max: 100.8 (29 Dec 2019 11:40)  HR: 68 (30 Dec 2019 05:10) (68 - 98)  BP: 131/61 (30 Dec 2019 05:10) (106/50 - 153/83)  BP(mean): --  RR: 17 (30 Dec 2019 05:10) (17 - 22)  SpO2: 99% (30 Dec 2019 05:10) (99% - 100%)      PHYSICAL EXAM:  Constitutional: elderly female in NAD  HEENT: normocephalic with moist oral mucosa  Neck: supple no LN's no JVD  Respiratory: lungs clear no rales no rhonchi  Cardiovascular: S1 S2 reg no murmurs  Gastrointestinal: +BS with soft, large abdominal pannus; nontender  Extremities: mild left leg edema no cyanosis  Skin: no rashes  Ortho: no jt swelling  Neuro: AAO x 2-3      LABS/DIAGNOSTIC TESTS:                        7.2    5.72  )-----------( 55       ( 30 Dec 2019 06:47 )             22.4     WBC Count: 5.72 K/uL ( @ 06:47)  WBC Count: 7.70 K/uL ( @ 12:43)        145  |  111<H>  |  14  ----------------------------<  100<H>  3.4<L>   |  25  |  0.99    Ca    8.2<L>      30 Dec 2019 06:47  Phos  3.9       Mg     2.3         TPro  7.5  /  Alb  4.1  /  TBili  2.6<H>  /  DBili  x   /  AST  18  /  ALT  23  /  AlkPhos  51      Urinalysis Basic - ( 29 Dec 2019 18:35 )    Color: Yellow / Appearance: Clear / S.010 / pH: x  Gluc: x / Ketone: Negative  / Bili: Negative / Urobili: Negative   Blood: x / Protein: Negative / Nitrite: Negative   Leuk Esterase: Negative / RBC: x / WBC x   Sq Epi: x / Non Sq Epi: x / Bacteria: x      LIVER FUNCTIONS - ( 29 Dec 2019 12:43 )  Alb: 4.1 g/dL / Pro: 7.5 g/dL / ALK PHOS: 51 U/L / ALT: 23 U/L DA / AST: 18 U/L / GGT: x           PT/INR - ( 29 Dec 2019 12:43 )   PT: 12.8 sec;   INR: 1.15 ratio    PTT - ( 29 Dec 2019 12:43 )  PTT:25.5 sec    Lactate, Blood: 2.0 mmol/L ( @ 06:39)  Lactate, Blood: 3.2 mmol/L ( @ 00:02)  Lactate, Blood: 6.9 mmol/L ( @ 19:57)  Lactate, Blood: 3.4 mmol/L ( @ 14:56)  Lactate, Blood: 4.3 mmol/L ( @ 12:43)    FLU A B RSV Detection by PCR (19 @ 13:31)    Flu A Result: [facility]: The Flu A B RSV assay is a Real-Time PCR test for the qualitative  detection and differentiation of Influenza A, Influenza B, and  Respiratory Syncytial Virus on nasopharyngeal swabs. The results should  be interpreted in the context of all clinical and laboratory findings.    Flu B Result: [facility]    RSV Result: [facility]        CULTURES:    Sputum culture - pending    BC - pending    UC - pending     .Urine   @ 10:13   <10,000 CFU/mL Normal Urogenital Marcelle  --  --        RADIOLOGY:  EXAM:  XR CHEST PORTABLE URGENT 1V                            PROCEDURE DATE:  2019          INTERPRETATION:  Clinical history: 74-year-old female, fever, dyspnea.    Single expiratory/kyphotic view is compared to radiograph/abdominal CT 11/15/2019 and demonstrate a normal cardiac silhouette and normal pulmonary vasculature with no gross consolidation, effusion, pneumothorax or osseous finding.    Curvilinear opacities in the lower lobes, left greater than right suspicious for patchy infiltrate/atelectasis, grossly unchanged.    Impression    Patchy areas of atelectasis/possible infiltrate in the lower lobes, grossly unchanged. Recommend correlation with physical findings however

## 2019-12-30 NOTE — PROGRESS NOTE ADULT - ATTENDING COMMENTS
75 y/o female with PMHx MDS,  HTN, fibroids, rheumatic heart disease presented to ED for evaluation of subjective fevers an chills for 2 weeks.    Vital Signs Last 24 Hrs  T(C): 36.8 (30 Dec 2019 05:10), Max: 37.5 (29 Dec 2019 18:47)  T(F): 98.3 (30 Dec 2019 05:10), Max: 99.5 (29 Dec 2019 18:47)  HR: 68 (30 Dec 2019 05:10) (68 - 72)  BP: 131/61 (30 Dec 2019 05:10) (106/50 - 131/61)  BP(mean): --  RR: 17 (30 Dec 2019 05:10) (17 - 18)  SpO2: 99% (30 Dec 2019 05:10) (99% - 100%)    A/P  Sepsis possibly secondary community acquired PNA in a immune compromised patient - will start Zosyn for broader coverage and azithromycin for atypical coverage,  F/u urine and blood cultures, F/u ID consult  MDS  Hemolytic Anemia on Prednisone 60 QD  HTN  Cushingoid appearance; Prednisone dose was decreased to 80 mg QD; continue for now, , Check FS AC &HS,  protonix for PPX  CKD stage 3

## 2019-12-30 NOTE — PROGRESS NOTE ADULT - PROBLEM SELECTOR PLAN 2
as above  Unclear if actual pneumonia as xray is unchanged since november and no other symptoms to indicate pna

## 2019-12-30 NOTE — CONSULT NOTE ADULT - ASSESSMENT
Pneumonia   Fever  Immunocompromised 1.	Possible Pneumonia   2.	Low grade fever - no reoccurrence  3.	Immunocompromised patient   ·	dc zithro  ·	cont zosyn 3.375gm IV q8h  ·	dc tomorrow on levaquin 750mg PO daily x 5 days  ·	reconsult prn

## 2019-12-30 NOTE — PROGRESS NOTE ADULT - PROBLEM SELECTOR PLAN 1
Presented with fever 100.8, lactate 4.3  CXR shows b/l patchy infiltrates unchanged from november  UA negative  No associated symptoms such as cough, sob, cp, n/v, c/d.   Patient lives at home and has not been hospitalized recently, but considering chronic prednisone and active malignancy, will treat as immunocompromised  c/w zosyn + Azithromycin empirically (12/29-). No recent hospitalizations, so holding off vanco  f/u legionella, strep, procalc  f/u echo  trend lactate until normalized - gentle IVF for now  monitor vitals  Pt hemodynamically stable at present  ID - Dr. Murphy

## 2019-12-31 VITALS
RESPIRATION RATE: 18 BRPM | TEMPERATURE: 98 F | HEART RATE: 75 BPM | DIASTOLIC BLOOD PRESSURE: 62 MMHG | OXYGEN SATURATION: 100 % | SYSTOLIC BLOOD PRESSURE: 151 MMHG

## 2019-12-31 LAB
ANION GAP SERPL CALC-SCNC: 8 MMOL/L — SIGNIFICANT CHANGE UP (ref 5–17)
ANISOCYTOSIS BLD QL: SLIGHT — SIGNIFICANT CHANGE UP
BASOPHILS # BLD AUTO: 0 K/UL — SIGNIFICANT CHANGE UP (ref 0–0.2)
BASOPHILS NFR BLD AUTO: 0 % — SIGNIFICANT CHANGE UP (ref 0–2)
BUN SERPL-MCNC: 13 MG/DL — SIGNIFICANT CHANGE UP (ref 7–18)
CALCIUM SERPL-MCNC: 8.4 MG/DL — SIGNIFICANT CHANGE UP (ref 8.4–10.5)
CHLORIDE SERPL-SCNC: 110 MMOL/L — HIGH (ref 96–108)
CO2 SERPL-SCNC: 25 MMOL/L — SIGNIFICANT CHANGE UP (ref 22–31)
CREAT SERPL-MCNC: 1.08 MG/DL — SIGNIFICANT CHANGE UP (ref 0.5–1.3)
EOSINOPHIL # BLD AUTO: 0 K/UL — SIGNIFICANT CHANGE UP (ref 0–0.5)
EOSINOPHIL NFR BLD AUTO: 0 % — SIGNIFICANT CHANGE UP (ref 0–6)
GLUCOSE SERPL-MCNC: 106 MG/DL — HIGH (ref 70–99)
HCT VFR BLD CALC: 24.5 % — LOW (ref 34.5–45)
HGB BLD-MCNC: 7.7 G/DL — LOW (ref 11.5–15.5)
LYMPHOCYTES # BLD AUTO: 1.7 K/UL — SIGNIFICANT CHANGE UP (ref 1–3.3)
LYMPHOCYTES # BLD AUTO: 33 % — SIGNIFICANT CHANGE UP (ref 13–44)
MACROCYTES BLD QL: SLIGHT — SIGNIFICANT CHANGE UP
MANUAL SMEAR VERIFICATION: SIGNIFICANT CHANGE UP
MCHC RBC-ENTMCNC: 31.4 GM/DL — LOW (ref 32–36)
MCHC RBC-ENTMCNC: 32 PG — SIGNIFICANT CHANGE UP (ref 27–34)
MCV RBC AUTO: 101.7 FL — HIGH (ref 80–100)
MICROCYTES BLD QL: SLIGHT — SIGNIFICANT CHANGE UP
MONOCYTES # BLD AUTO: 0.36 K/UL — SIGNIFICANT CHANGE UP (ref 0–0.9)
MONOCYTES NFR BLD AUTO: 7 % — SIGNIFICANT CHANGE UP (ref 2–14)
NEUTROPHILS # BLD AUTO: 3.09 K/UL — SIGNIFICANT CHANGE UP (ref 1.8–7.4)
NEUTROPHILS NFR BLD AUTO: 60 % — SIGNIFICANT CHANGE UP (ref 43–77)
NRBC # BLD: 48 /100 — HIGH (ref 0–0)
OVALOCYTES BLD QL SMEAR: SLIGHT — SIGNIFICANT CHANGE UP
PLAT MORPH BLD: NORMAL — SIGNIFICANT CHANGE UP
PLATELET # BLD AUTO: 79 K/UL — LOW (ref 150–400)
POIKILOCYTOSIS BLD QL AUTO: SLIGHT — SIGNIFICANT CHANGE UP
POLYCHROMASIA BLD QL SMEAR: SIGNIFICANT CHANGE UP
POTASSIUM SERPL-MCNC: 3.9 MMOL/L — SIGNIFICANT CHANGE UP (ref 3.5–5.3)
POTASSIUM SERPL-SCNC: 3.9 MMOL/L — SIGNIFICANT CHANGE UP (ref 3.5–5.3)
RBC # BLD: 2.41 M/UL — LOW (ref 3.8–5.2)
RBC # FLD: 23.4 % — HIGH (ref 10.3–14.5)
RBC BLD AUTO: ABNORMAL
SCHISTOCYTES BLD QL AUTO: SLIGHT — SIGNIFICANT CHANGE UP
SODIUM SERPL-SCNC: 143 MMOL/L — SIGNIFICANT CHANGE UP (ref 135–145)
SPHEROCYTES BLD QL SMEAR: SLIGHT — SIGNIFICANT CHANGE UP
WBC # BLD: 5.15 K/UL — SIGNIFICANT CHANGE UP (ref 3.8–10.5)
WBC # FLD AUTO: 5.15 K/UL — SIGNIFICANT CHANGE UP (ref 3.8–10.5)

## 2019-12-31 PROCEDURE — 84145 PROCALCITONIN (PCT): CPT

## 2019-12-31 PROCEDURE — 96361 HYDRATE IV INFUSION ADD-ON: CPT

## 2019-12-31 PROCEDURE — 87798 DETECT AGENT NOS DNA AMP: CPT

## 2019-12-31 PROCEDURE — 83036 HEMOGLOBIN GLYCOSYLATED A1C: CPT

## 2019-12-31 PROCEDURE — 36415 COLL VENOUS BLD VENIPUNCTURE: CPT

## 2019-12-31 PROCEDURE — 84100 ASSAY OF PHOSPHORUS: CPT

## 2019-12-31 PROCEDURE — 87899 AGENT NOS ASSAY W/OPTIC: CPT

## 2019-12-31 PROCEDURE — 82607 VITAMIN B-12: CPT

## 2019-12-31 PROCEDURE — 82306 VITAMIN D 25 HYDROXY: CPT

## 2019-12-31 PROCEDURE — 85027 COMPLETE CBC AUTOMATED: CPT

## 2019-12-31 PROCEDURE — 87070 CULTURE OTHR SPECIMN AEROBIC: CPT

## 2019-12-31 PROCEDURE — 82728 ASSAY OF FERRITIN: CPT

## 2019-12-31 PROCEDURE — 85610 PROTHROMBIN TIME: CPT

## 2019-12-31 PROCEDURE — 96375 TX/PRO/DX INJ NEW DRUG ADDON: CPT

## 2019-12-31 PROCEDURE — 84466 ASSAY OF TRANSFERRIN: CPT

## 2019-12-31 PROCEDURE — 87449 NOS EACH ORGANISM AG IA: CPT

## 2019-12-31 PROCEDURE — 86900 BLOOD TYPING SEROLOGIC ABO: CPT

## 2019-12-31 PROCEDURE — 83550 IRON BINDING TEST: CPT

## 2019-12-31 PROCEDURE — 87633 RESP VIRUS 12-25 TARGETS: CPT

## 2019-12-31 PROCEDURE — 83735 ASSAY OF MAGNESIUM: CPT

## 2019-12-31 PROCEDURE — 87581 M.PNEUMON DNA AMP PROBE: CPT

## 2019-12-31 PROCEDURE — 71045 X-RAY EXAM CHEST 1 VIEW: CPT

## 2019-12-31 PROCEDURE — 82962 GLUCOSE BLOOD TEST: CPT

## 2019-12-31 PROCEDURE — 84443 ASSAY THYROID STIM HORMONE: CPT

## 2019-12-31 PROCEDURE — 80048 BASIC METABOLIC PNL TOTAL CA: CPT

## 2019-12-31 PROCEDURE — 87040 BLOOD CULTURE FOR BACTERIA: CPT

## 2019-12-31 PROCEDURE — 81003 URINALYSIS AUTO W/O SCOPE: CPT

## 2019-12-31 PROCEDURE — 87486 CHLMYD PNEUM DNA AMP PROBE: CPT

## 2019-12-31 PROCEDURE — 99291 CRITICAL CARE FIRST HOUR: CPT | Mod: 25

## 2019-12-31 PROCEDURE — 83605 ASSAY OF LACTIC ACID: CPT

## 2019-12-31 PROCEDURE — 80061 LIPID PANEL: CPT

## 2019-12-31 PROCEDURE — 85730 THROMBOPLASTIN TIME PARTIAL: CPT

## 2019-12-31 PROCEDURE — 99239 HOSP IP/OBS DSCHRG MGMT >30: CPT | Mod: GC

## 2019-12-31 PROCEDURE — 86850 RBC ANTIBODY SCREEN: CPT

## 2019-12-31 PROCEDURE — 83540 ASSAY OF IRON: CPT

## 2019-12-31 PROCEDURE — 80053 COMPREHEN METABOLIC PANEL: CPT

## 2019-12-31 PROCEDURE — 87086 URINE CULTURE/COLONY COUNT: CPT

## 2019-12-31 PROCEDURE — 96374 THER/PROPH/DIAG INJ IV PUSH: CPT

## 2019-12-31 PROCEDURE — 86901 BLOOD TYPING SEROLOGIC RH(D): CPT

## 2019-12-31 PROCEDURE — 87631 RESP VIRUS 3-5 TARGETS: CPT

## 2019-12-31 PROCEDURE — 82746 ASSAY OF FOLIC ACID SERUM: CPT

## 2019-12-31 RX ORDER — ERGOCALCIFEROL 1.25 MG/1
1 CAPSULE ORAL
Qty: 7 | Refills: 0
Start: 2019-12-31 | End: 2020-02-17

## 2019-12-31 RX ORDER — FUROSEMIDE 40 MG
1 TABLET ORAL
Qty: 0 | Refills: 0 | DISCHARGE

## 2019-12-31 RX ORDER — FERROUS SULFATE 325(65) MG
1 TABLET ORAL
Qty: 0 | Refills: 0 | DISCHARGE

## 2019-12-31 RX ORDER — FOLIC ACID 0.8 MG
1 TABLET ORAL
Qty: 0 | Refills: 0 | DISCHARGE

## 2019-12-31 RX ADMIN — PANTOPRAZOLE SODIUM 40 MILLIGRAM(S): 20 TABLET, DELAYED RELEASE ORAL at 06:29

## 2019-12-31 RX ADMIN — Medication 25 MILLIGRAM(S): at 05:07

## 2019-12-31 RX ADMIN — Medication 60 MILLIGRAM(S): at 05:07

## 2019-12-31 RX ADMIN — Medication 1 MILLIGRAM(S): at 12:06

## 2019-12-31 RX ADMIN — PIPERACILLIN AND TAZOBACTAM 25 GRAM(S): 4; .5 INJECTION, POWDER, LYOPHILIZED, FOR SOLUTION INTRAVENOUS at 05:08

## 2019-12-31 RX ADMIN — Medication 325 MILLIGRAM(S): at 12:06

## 2019-12-31 RX ADMIN — GABAPENTIN 300 MILLIGRAM(S): 400 CAPSULE ORAL at 05:08

## 2019-12-31 NOTE — DISCHARGE NOTE PROVIDER - HOSPITAL COURSE
73 y/o female with PMHx leukemia, anemia, HTN, fibroids, rheumatic heart disease, MDS, PSHx hysterectomy presenting due to chills and subjective fevers for the past 2 weeks. She denies any associated symptoms. She is currently on chemotherapy for leukemia with outpatient oncologist, Dr. France. Last treatment was 2 weeks ago. She does not currently have a date for her next treatment but was planned to follow up with bone marrow specialist, Dr. Spence, next week. She denies any shortness of breath, chest pain, cough, sputum production, nausea, vomiting.     Fever was 100.8, lactate  was 4.3, patient was admitted for sepsis likely secondary to PNA.    UA negative. CXR shows b/l patchy infiltrates unchanged from november. No associated symptoms such as cough, sob, cp, n/v, c/d.     As patient is on chronic prednisone and active malignancy, we treated as immunocompromised with IV zosyn + Azithromycin empirically (12/29-31).    Urine legionella antigen was negative, procalcalcitonin was elevated at 0.40. Lactate normalized.    Hb was around 7-8, which is her baseline as per her oncologist Nae Watts at [facility] ([phone]).    The patient is medically stable and ready for discharge. Pt needs to take PO Levaquine 750mg daily x 5days, and to be followed up by oncologist Dr. France. 73 y/o female with PMHx MDS, autoimmune hemolytic anemia, HTN, fibroids, rheumatic heart disease,  PSHx hysterectomy presenting due to chills and subjective fevers for the past 2 weeks. She denies any associated symptoms. She is currently on chemotherapy for leukemia with outpatient oncologist, Dr. France. Last treatment was 2 weeks ago. She does not currently have a date for her next treatment but was planned to follow up with bone marrow specialist, Dr. Spence, next week. She denies any shortness of breath, chest pain, cough, sputum production, nausea, vomiting.     Fever was 100.8, lactate  was 4.3, patient was admitted for sepsis likely secondary to PNA.    UA negative. CXR shows b/l patchy infiltrates unchanged from november. No associated symptoms such as cough, sob, cp, n/v, c/d.     As patient is on chronic prednisone and active malignancy, we treated as immunocompromised with IV zosyn + Azithromycin empirically (12/29-31).    Urine legionella antigen was negative, procalcalcitonin was elevated at 0.40. Lactate normalized.    Hb was around 7-8, which is her baseline as per her oncologist Dr. France, [place] at [facility] ([phone]).    CT Abdomen and Pelvis w/ IV Cont showed 2 mm left distal ureteral calculus without hydronephrosis, however mild perinephric stranding and delayed left nephrogram.    The patient is medically stable and ready for discharge. Pt needs to take PO Levaquine 750mg daily x 5days, and to be followed up by oncologist Dr. France. 75 y/o female with PMHx MDS, autoimmune hemolytic anemia, HTN, fibroids, rheumatic heart disease,  PSHx hysterectomy presenting due to chills and subjective fevers for the past 2 weeks. She denies any associated symptoms. She is currently on chemotherapy for leukemia with outpatient oncologist, Dr. France. Last treatment was 2 weeks ago. She does not currently have a date for her next treatment but was planned to follow up with bone marrow specialist, Dr. Spence, next week. She denies any shortness of breath, chest pain, cough, sputum production, nausea, vomiting.     Fever was 100.8, lactate  was 4.3, patient was admitted for sepsis likely secondary to PNA.    UA negative. CXR shows b/l patchy infiltrates unchanged from november. No associated symptoms such as cough, sob, cp, n/v, c/d.     As patient is on chronic prednisone and active malignancy, we treated as immunocompromised with IV zosyn + Azithromycin empirically (12/29-31).    Urine legionella antigen was negative, procalcalcitonin was elevated at 0.40. Lactate normalized.    Hb was around 7-8, which is her baseline as per her oncologist Dr. France, Lorieat at [facility] ([phone]).    CT Abdomen and Pelvis w/ IV Cont showed 2 mm left distal ureteral calculus without hydronephrosis, however mild perinephric stranding and delayed left nephrogram.    Seen by ID recommended Levaquin for 5 days. The patient is medically stable and ready for discharge. Pt needs to take PO Levaquine 750mg daily x 5days, and to be followed up by oncologist Dr. France.

## 2019-12-31 NOTE — DISCHARGE NOTE PROVIDER - NSDCFUADDAPPT_GEN_ALL_CORE_FT
- Follow up with;    - oncologist Nae Watts at [facility] ([phone]) regularly   - Your primary doctor Dr. Tameka Dasilva ([phone]) in 1 week with this discharge paper.

## 2019-12-31 NOTE — DISCHARGE NOTE PROVIDER - NSDCMRMEDTOKEN_GEN_ALL_CORE_FT
ALENDRONATE SODIUM 70 MG TABS: 1  orally once a week  amLODIPine 10 mg oral tablet: 1 tab(s) orally once a day  ferrous sulfate 325 mg (65 mg elemental iron) oral tablet: 1 tab(s) orally once a day  Flomax 0.4 mg oral capsule: 1 cap(s) orally once a day   folic acid 1 mg oral tablet: 1 tab(s) orally once a day  furosemide 40 mg oral tablet: 1 tab(s) orally once a day  gabapentin 300 mg oral capsule: 1 cap(s) orally 3 times a day  metoprolol succinate 25 mg oral capsule, extended release: 1 cap(s) orally once a day   predniSONE: 80 milligram(s) orally once a day  prochlorperazine 10 mg oral tablet: 1 tab(s) orally 4 times a day, As Needed - for anxiety  traMADol 50 mg oral tablet: 1 tab(s) orally 4 times a day, As Needed MDD:4  Zofran ODT 8 mg oral tablet, disintegratin tab(s) orally 3 times a day, As Needed for nausea/vomiting ALENDRONATE SODIUM 70 MG TABS: 1  orally once a week  amLODIPine 10 mg oral tablet: 1 tab(s) orally once a day  ferrous sulfate 325 mg (65 mg elemental iron) oral tablet: 1 tab(s) orally once a day  Flomax 0.4 mg oral capsule: 1 cap(s) orally once a day   folic acid 1 mg oral tablet: 1 tab(s) orally once a day  furosemide 40 mg oral tablet: 1 tab(s) orally once a day  gabapentin 300 mg oral capsule: 1 cap(s) orally 3 times a day  metoprolol succinate 25 mg oral capsule, extended release: 1 cap(s) orally once a day   prochlorperazine 10 mg oral tablet: 1 tab(s) orally 4 times a day, As Needed - for anxiety  traMADol 50 mg oral tablet: 1 tab(s) orally 4 times a day, As Needed MDD:4  Zofran ODT 8 mg oral tablet, disintegratin tab(s) orally 3 times a day, As Needed for nausea/vomiting

## 2019-12-31 NOTE — DISCHARGE NOTE PROVIDER - NSDCCPCAREPLAN_GEN_ALL_CORE_FT
PRINCIPAL DISCHARGE DIAGNOSIS  Diagnosis: Sepsis, due to unspecified organism, unspecified whether acute organ dysfunction present  Assessment and Plan of Treatment: You came to the hospital with fever and chills. You had fever on admission and lactate (marker of infection) was high, procalcalcitonin was elevated at 0.40. This condition is called sepsis and it's the body response to infection. We gave you IV fluid and IV antibiotic, and it's resolved. We think this condition is from pneumonia. Urianalysis was negative, which means no infection in urine. Urine legionella antigen was negative, lactate normalized. Please continue taking oral antibiotic  "Levaquine 750mg"  daily for 5days and follow up with your primary care doctor in 1 week.      SECONDARY DISCHARGE DIAGNOSES  Diagnosis: Pneumonia of both lower lobes due to infectious organism  Assessment and Plan of Treatment: Chest X-ray shows bilateral patchy infiltrates unchanged from november. You are on chronic prednisone and active malignancy, we treated as immunocompromised with IV antibiotic zosyn + Azithromycin empirically (12/29-31). Please continue taking oral antibiotic  "Levaquine 750mg"  daily for 5days, and  follow up with your primary care doctor in 1 week.    Diagnosis: Vitamin D deficiency  Assessment and Plan of Treatment: Your serum vitamin D level is low and we started supplement. Please take vitamin D pill once a week for 7 more weeks, and follow up with your primary care doctor regularly.    Diagnosis: MDS (myelodysplastic syndrome)  Assessment and Plan of Treatment: Please follow up with your oncologistoncologist Nae Watts at [facility] ([phone]) and get chemotherapy.    Diagnosis: Hypokalemia  Assessment and Plan of Treatment: Your potassium was low and it was replaced. Please follow up with your primary doctor regularly.    Diagnosis: Essential hypertension  Assessment and Plan of Treatment: Your blood pressure medications are held off due to sepsis. Please re-start your blood pressure medication and follow up with your primary care doctor.    Diagnosis: Anemia  Assessment and Plan of Treatment: Your hemoglobin was around 7-8, which is her baseline as per your oncologist oncologist Nae Watts at [facility] ([phone]). PRINCIPAL DISCHARGE DIAGNOSIS  Diagnosis: Sepsis, due to unspecified organism, unspecified whether acute organ dysfunction present  Assessment and Plan of Treatment: You came to the hospital with fever and chills. You had fever on admission and lactate (marker of infection) was high, procalcalcitonin was elevated at 0.40. This condition is called sepsis and it's the body response to infection. We gave you IV fluid and IV antibiotic, and it's resolved. We think this condition is from pneumonia. Urianalysis was negative, which means no infection in urine. Urine legionella antigen was negative, lactate normalized. Please continue taking oral antibiotic  "Levaquine 750mg"  daily for 5days and follow up with your primary care doctor in 1 week.      SECONDARY DISCHARGE DIAGNOSES  Diagnosis: Pneumonia of both lower lobes due to infectious organism  Assessment and Plan of Treatment: Chest X-ray shows bilateral patchy infiltrates unchanged from november. You are on chronic prednisone and active malignancy, we treated as immunocompromised with IV antibiotic zosyn + Azithromycin empirically (12/29-31). Please continue taking oral antibiotic  "Levaquine 750mg"  daily for 5days, and  follow up with your primary care doctor in 1 week.    Diagnosis: Vitamin D deficiency  Assessment and Plan of Treatment: Your serum vitamin D level is low and we started supplement. Please take vitamin D pill once a week for 7 more weeks, and follow up with your primary care doctor regularly.    Diagnosis: Ureteral calculus  Assessment and Plan of Treatment: CT Abdomen and Pelvis w/ IV Cont showed 2 mm left distal ureteral calculus without hydronephrosis with mild perinephric stranding and delayed left nephrogram. Please follow up with your primary care doctor. If you have sever pain on the back, visit emergency department.    Diagnosis: MDS (myelodysplastic syndrome)  Assessment and Plan of Treatment: Please follow up with your oncologistoncologist Nae Watts at [facility] ([phone]) and get chemotherapy.    Diagnosis: Hypokalemia  Assessment and Plan of Treatment: Your potassium was low and it was replaced. Please follow up with your primary doctor regularly.    Diagnosis: Essential hypertension  Assessment and Plan of Treatment: Your blood pressure medications are held off due to sepsis. Please re-start your blood pressure medication and follow up with your primary care doctor.    Diagnosis: Anemia  Assessment and Plan of Treatment: Your hemoglobin was around 7-8, which is her baseline as per your oncologist oncologist Nae Watts at [facility] ([phone]).

## 2019-12-31 NOTE — DISCHARGE NOTE NURSING/CASE MANAGEMENT/SOCIAL WORK - NSDCFUADDAPPT_GEN_ALL_CORE_FT
- Follow up with;    - oncologist Nae Watts at [facility] ([phone]) regularly   - Your primary doctor Dr. Tameka Dasilva ([phone]) in 1 week with this discharge paper.

## 2019-12-31 NOTE — DISCHARGE NOTE NURSING/CASE MANAGEMENT/SOCIAL WORK - PATIENT PORTAL LINK FT
You can access the FollowMyHealth Patient Portal offered by [facility] by registering at the following website: [email or web address]. By joining [facility]’s FollowMyHealth portal, you will also be able to view your health information using other applications (apps) compatible with our system.

## 2019-12-31 NOTE — DISCHARGE NOTE PROVIDER - ATTENDING COMMENTS
Seen and examined patient. No complaints.            Vital Signs Last 24 Hrs    T(C): 36.4 (31 Dec 2019 04:40), Max: 36.8 (30 Dec 2019 17:25)    T(F): 97.6 (31 Dec 2019 04:40), Max: 98.2 (30 Dec 2019 17:25)    HR: 75 (31 Dec 2019 04:40) (69 - 75)    BP: 151/62 (31 Dec 2019 04:40) (131/67 - 151/62)    BP(mean): --    RR: 18 (31 Dec 2019 04:40) (17 - 18)    SpO2: 100% (31 Dec 2019 04:40) (97% - 100%)        OE     NAD    Cushingoid face    RRR s1s2    Decreased air entry to b/l bases, no rhonchi or rales    Soft, NT, ND, +bs            A/P    Sepsis possibly secondary community acquired PNA in a immune compromised patient      MDS    Hemolytic Anemia on Prednisone 60 QD    HTN    Cushingoid appearance; Prednisone 60 mg QD verified with Private oncologist    CKD stage 3         Advised continue Levaquin 750 mg PO for 5 days. F/u PCP and  (Oncologist)

## 2020-01-01 LAB
S PNEUM AG SER QL: SIGNIFICANT CHANGE UP
S PNEUM AG SER QL: SIGNIFICANT CHANGE UP

## 2020-01-03 LAB
CULTURE RESULTS: SIGNIFICANT CHANGE UP
CULTURE RESULTS: SIGNIFICANT CHANGE UP
SPECIMEN SOURCE: SIGNIFICANT CHANGE UP
SPECIMEN SOURCE: SIGNIFICANT CHANGE UP

## 2020-01-04 ENCOUNTER — INPATIENT (INPATIENT)
Facility: HOSPITAL | Age: 75
LOS: 3 days | Discharge: HOME | DRG: 91 | End: 2020-01-08
Attending: INTERNAL MEDICINE | Admitting: INTERNAL MEDICINE
Payer: COMMERCIAL

## 2020-01-04 VITALS
WEIGHT: 179.9 LBS | RESPIRATION RATE: 20 BRPM | TEMPERATURE: 98 F | SYSTOLIC BLOOD PRESSURE: 146 MMHG | OXYGEN SATURATION: 99 % | DIASTOLIC BLOOD PRESSURE: 80 MMHG | HEART RATE: 99 BPM | HEIGHT: 63 IN

## 2020-01-04 DIAGNOSIS — N17.9 ACUTE KIDNEY FAILURE, UNSPECIFIED: ICD-10-CM

## 2020-01-04 DIAGNOSIS — E87.2 ACIDOSIS: ICD-10-CM

## 2020-01-04 DIAGNOSIS — R68.83 CHILLS (WITHOUT FEVER): ICD-10-CM

## 2020-01-04 DIAGNOSIS — C95.90 LEUKEMIA, UNSPECIFIED NOT HAVING ACHIEVED REMISSION: ICD-10-CM

## 2020-01-04 DIAGNOSIS — Z90.710 ACQUIRED ABSENCE OF BOTH CERVIX AND UTERUS: Chronic | ICD-10-CM

## 2020-01-04 DIAGNOSIS — E24.2 DRUG-INDUCED CUSHING'S SYNDROME: ICD-10-CM

## 2020-01-04 DIAGNOSIS — I10 ESSENTIAL (PRIMARY) HYPERTENSION: ICD-10-CM

## 2020-01-04 DIAGNOSIS — Z29.9 ENCOUNTER FOR PROPHYLACTIC MEASURES, UNSPECIFIED: ICD-10-CM

## 2020-01-04 LAB
ALBUMIN SERPL ELPH-MCNC: 4.1 G/DL — SIGNIFICANT CHANGE UP (ref 3.5–5)
ALP SERPL-CCNC: 43 U/L — SIGNIFICANT CHANGE UP (ref 40–120)
ALT FLD-CCNC: 24 U/L DA — SIGNIFICANT CHANGE UP (ref 10–60)
ANION GAP SERPL CALC-SCNC: 12 MMOL/L — SIGNIFICANT CHANGE UP (ref 5–17)
APPEARANCE UR: CLEAR — SIGNIFICANT CHANGE UP
APTT BLD: 24.6 SEC — LOW (ref 27.5–36.3)
AST SERPL-CCNC: 12 U/L — SIGNIFICANT CHANGE UP (ref 10–40)
BASOPHILS # BLD AUTO: 0 K/UL — SIGNIFICANT CHANGE UP (ref 0–0.2)
BASOPHILS NFR BLD AUTO: 0 % — SIGNIFICANT CHANGE UP (ref 0–2)
BILIRUB SERPL-MCNC: 2.2 MG/DL — HIGH (ref 0.2–1.2)
BILIRUB UR-MCNC: NEGATIVE — SIGNIFICANT CHANGE UP
BUN SERPL-MCNC: 14 MG/DL — SIGNIFICANT CHANGE UP (ref 7–18)
CALCIUM SERPL-MCNC: 9 MG/DL — SIGNIFICANT CHANGE UP (ref 8.4–10.5)
CHLORIDE SERPL-SCNC: 105 MMOL/L — SIGNIFICANT CHANGE UP (ref 96–108)
CO2 SERPL-SCNC: 24 MMOL/L — SIGNIFICANT CHANGE UP (ref 22–31)
COLOR SPEC: YELLOW — SIGNIFICANT CHANGE UP
CREAT SERPL-MCNC: 1.25 MG/DL — SIGNIFICANT CHANGE UP (ref 0.5–1.3)
DIFF PNL FLD: NEGATIVE — SIGNIFICANT CHANGE UP
EOSINOPHIL # BLD AUTO: 0 K/UL — SIGNIFICANT CHANGE UP (ref 0–0.5)
EOSINOPHIL NFR BLD AUTO: 0 % — SIGNIFICANT CHANGE UP (ref 0–6)
FLU A RESULT: SIGNIFICANT CHANGE UP
FLU A RESULT: SIGNIFICANT CHANGE UP
FLUAV AG NPH QL: SIGNIFICANT CHANGE UP
FLUBV AG NPH QL: SIGNIFICANT CHANGE UP
GLUCOSE SERPL-MCNC: 152 MG/DL — HIGH (ref 70–99)
GLUCOSE UR QL: NEGATIVE — SIGNIFICANT CHANGE UP
HCT VFR BLD CALC: 30.8 % — LOW (ref 34.5–45)
HGB BLD-MCNC: 10.1 G/DL — LOW (ref 11.5–15.5)
INR BLD: 1.24 RATIO — HIGH (ref 0.88–1.16)
KETONES UR-MCNC: NEGATIVE — SIGNIFICANT CHANGE UP
LACTATE SERPL-SCNC: 1.5 MMOL/L — SIGNIFICANT CHANGE UP (ref 0.7–2)
LACTATE SERPL-SCNC: 5.9 MMOL/L — CRITICAL HIGH (ref 0.7–2)
LEUKOCYTE ESTERASE UR-ACNC: ABNORMAL
LYMPHOCYTES # BLD AUTO: 0.07 K/UL — LOW (ref 1–3.3)
LYMPHOCYTES # BLD AUTO: 1 % — LOW (ref 13–44)
MCHC RBC-ENTMCNC: 32.6 PG — SIGNIFICANT CHANGE UP (ref 27–34)
MCHC RBC-ENTMCNC: 32.8 GM/DL — SIGNIFICANT CHANGE UP (ref 32–36)
MCV RBC AUTO: 99.4 FL — SIGNIFICANT CHANGE UP (ref 80–100)
MONOCYTES # BLD AUTO: 0.49 K/UL — SIGNIFICANT CHANGE UP (ref 0–0.9)
MONOCYTES NFR BLD AUTO: 7 % — SIGNIFICANT CHANGE UP (ref 2–14)
NEUTROPHILS # BLD AUTO: 6.5 K/UL — SIGNIFICANT CHANGE UP (ref 1.8–7.4)
NEUTROPHILS NFR BLD AUTO: 92 % — HIGH (ref 43–77)
NITRITE UR-MCNC: NEGATIVE — SIGNIFICANT CHANGE UP
PH UR: 6 — SIGNIFICANT CHANGE UP (ref 5–8)
PLATELET # BLD AUTO: 234 K/UL — SIGNIFICANT CHANGE UP (ref 150–400)
POTASSIUM SERPL-MCNC: 3.4 MMOL/L — LOW (ref 3.5–5.3)
POTASSIUM SERPL-SCNC: 3.4 MMOL/L — LOW (ref 3.5–5.3)
PROT SERPL-MCNC: 7.4 G/DL — SIGNIFICANT CHANGE UP (ref 6–8.3)
PROT UR-MCNC: 30 MG/DL
PROTHROM AB SERPL-ACNC: 13.9 SEC — HIGH (ref 10–12.9)
RBC # BLD: 3.1 M/UL — LOW (ref 3.8–5.2)
RBC # FLD: 21.5 % — HIGH (ref 10.3–14.5)
RSV RESULT: SIGNIFICANT CHANGE UP
RSV RNA RESP QL NAA+PROBE: SIGNIFICANT CHANGE UP
SODIUM SERPL-SCNC: 141 MMOL/L — SIGNIFICANT CHANGE UP (ref 135–145)
SP GR SPEC: 1.02 — SIGNIFICANT CHANGE UP (ref 1.01–1.02)
UROBILINOGEN FLD QL: NEGATIVE — SIGNIFICANT CHANGE UP
WBC # BLD: 7.07 K/UL — SIGNIFICANT CHANGE UP (ref 3.8–10.5)
WBC # FLD AUTO: 7.07 K/UL — SIGNIFICANT CHANGE UP (ref 3.8–10.5)

## 2020-01-04 PROCEDURE — 99223 1ST HOSP IP/OBS HIGH 75: CPT | Mod: GC

## 2020-01-04 PROCEDURE — 71045 X-RAY EXAM CHEST 1 VIEW: CPT | Mod: 26

## 2020-01-04 PROCEDURE — 99285 EMERGENCY DEPT VISIT HI MDM: CPT

## 2020-01-04 RX ORDER — POTASSIUM CHLORIDE 20 MEQ
40 PACKET (EA) ORAL EVERY 4 HOURS
Refills: 0 | Status: COMPLETED | OUTPATIENT
Start: 2020-01-04 | End: 2020-01-05

## 2020-01-04 RX ORDER — METOPROLOL TARTRATE 50 MG
25 TABLET ORAL
Refills: 0 | Status: DISCONTINUED | OUTPATIENT
Start: 2020-01-04 | End: 2020-01-08

## 2020-01-04 RX ORDER — TAMSULOSIN HYDROCHLORIDE 0.4 MG/1
0.4 CAPSULE ORAL AT BEDTIME
Refills: 0 | Status: DISCONTINUED | OUTPATIENT
Start: 2020-01-04 | End: 2020-01-08

## 2020-01-04 RX ORDER — SODIUM CHLORIDE 9 MG/ML
2500 INJECTION INTRAMUSCULAR; INTRAVENOUS; SUBCUTANEOUS ONCE
Refills: 0 | Status: COMPLETED | OUTPATIENT
Start: 2020-01-04 | End: 2020-01-04

## 2020-01-04 RX ORDER — SODIUM CHLORIDE 9 MG/ML
1000 INJECTION INTRAMUSCULAR; INTRAVENOUS; SUBCUTANEOUS
Refills: 0 | Status: DISCONTINUED | OUTPATIENT
Start: 2020-01-04 | End: 2020-01-07

## 2020-01-04 RX ORDER — CEFEPIME 1 G/1
2000 INJECTION, POWDER, FOR SOLUTION INTRAMUSCULAR; INTRAVENOUS ONCE
Refills: 0 | Status: COMPLETED | OUTPATIENT
Start: 2020-01-04 | End: 2020-01-04

## 2020-01-04 RX ORDER — AMLODIPINE BESYLATE 2.5 MG/1
10 TABLET ORAL DAILY
Refills: 0 | Status: DISCONTINUED | OUTPATIENT
Start: 2020-01-04 | End: 2020-01-08

## 2020-01-04 RX ORDER — ENOXAPARIN SODIUM 100 MG/ML
40 INJECTION SUBCUTANEOUS DAILY
Refills: 0 | Status: DISCONTINUED | OUTPATIENT
Start: 2020-01-04 | End: 2020-01-08

## 2020-01-04 RX ADMIN — Medication 40 MILLIEQUIVALENT(S): at 22:14

## 2020-01-04 RX ADMIN — TAMSULOSIN HYDROCHLORIDE 0.4 MILLIGRAM(S): 0.4 CAPSULE ORAL at 22:12

## 2020-01-04 RX ADMIN — SODIUM CHLORIDE 2500 MILLILITER(S): 9 INJECTION INTRAMUSCULAR; INTRAVENOUS; SUBCUTANEOUS at 16:38

## 2020-01-04 RX ADMIN — CEFEPIME 100 MILLIGRAM(S): 1 INJECTION, POWDER, FOR SOLUTION INTRAMUSCULAR; INTRAVENOUS at 16:39

## 2020-01-04 RX ADMIN — SODIUM CHLORIDE 75 MILLILITER(S): 9 INJECTION INTRAMUSCULAR; INTRAVENOUS; SUBCUTANEOUS at 22:12

## 2020-01-04 RX ADMIN — CEFEPIME 2000 MILLIGRAM(S): 1 INJECTION, POWDER, FOR SOLUTION INTRAMUSCULAR; INTRAVENOUS at 17:22

## 2020-01-04 RX ADMIN — SODIUM CHLORIDE 2500 MILLILITER(S): 9 INJECTION INTRAMUSCULAR; INTRAVENOUS; SUBCUTANEOUS at 13:58

## 2020-01-04 NOTE — ED ADULT NURSE NOTE - OBJECTIVE STATEMENT
arrived ambulatory reports having body shakes denies fever/chills chest pain or discomfort breathing in room air.

## 2020-01-04 NOTE — ED PROVIDER NOTE - PMH
Anemia    Essential hypertension    Fibroids    H/O rheumatic heart disease    Leukemia    MDS (myelodysplastic syndrome)

## 2020-01-04 NOTE — H&P ADULT - PROBLEM SELECTOR PLAN 7
IMPROVE VTE Individual Risk Assessment  RISK                                                         Points  [  ] Previous VTE                                      3  [  ] Thrombophilia                                   2  [  ] Lower limb paralysis                         2 (unable to hold up >15 seconds)    [ x ] Current Cancer                                  2       (within 6 months)  [  ] Immobilization > 24 hrs                    1  [  ] ICU/CCU stay > 24 hrs                         1  [ x ] Age > 60                                              1  IMPROVE VTE Score 3  started on lovenox for dvt ppx

## 2020-01-04 NOTE — ED PROVIDER NOTE - OBJECTIVE STATEMENT
75 y/o F with a significant PMHx of leukemia, last chemo on November, on antibiotics for PNA, presents to the ED with complaints of uncontrollable shaking since last night. Patient states she is unable to get a full night's sleep due to symptoms. Patient discharged 3 days ago from [place] for treatment of PNA. Patient states she is feeling fine. Patient told by her hematologist that her symptoms are due to her "blood popping". Denies shortness of breath, chest pain, fever or sweats. Patient still on prednisone. Denies abdominal pain or urinary symptoms.

## 2020-01-04 NOTE — ED PROVIDER NOTE - CARE PLAN
Principal Discharge DX:	Shaking chills  Secondary Diagnosis:	Acute kidney injury  Secondary Diagnosis:	Lactic acidosis

## 2020-01-04 NOTE — ED PROVIDER NOTE - CLINICAL SUMMARY MEDICAL DECISION MAKING FREE TEXT BOX
73 y/o F with history of leukemia, immunosuppressed, on prednisone, presents with shaking. Concern for bacteremia. Will do labs, cultures, IV fluids and admit.

## 2020-01-04 NOTE — CHART NOTE - NSCHARTNOTEFT_GEN_A_CORE
Patient is a 74y old  Female who presents with a chief complaint of shivering, not feeling well and was admitted for concern of bacteremia in an immunocompromised patient. Patient was recently discharged [facility] for PNA treated with Zosyn and azithromycin while inpatient and then discharged with Levaquin. She reports being compliant with medications. Denies any fever, cough, SOB, palpitations, chest pain, abdominal pain, nausea, vomiting, diarrhea, constipation but complains of dizziness and feeling weak.     Vitals: afebrile, vitals WNL     MEDICATIONS  (STANDING):  amLODIPine   Tablet 10 milliGRAM(s) Oral daily  enoxaparin Injectable 40 milliGRAM(s) SubCutaneous daily  metoprolol tartrate 25 milliGRAM(s) Oral two times a day  potassium chloride    Tablet ER 40 milliEquivalent(s) Oral every 4 hours  sodium chloride 0.9%. 1000 milliLiter(s) (75 mL/Hr) IV Continuous <Continuous>  tamsulosin 0.4 milliGRAM(s) Oral at bedtime  trimethoprim  160 mG/sulfamethoxazole 800 mG 1 Tablet(s) Oral daily    PHYSICAL EXAM:  GENERAL: NAD, obese   NERVOUS SYSTEM:  Alert & Oriented X3, Good concentration  CHEST/LUNG: Clear to auscultation bilaterally; No rales, rhonchi, wheezing, or rubs  HEART: Regular rate and rhythm; No murmurs, rubs, or gallops  ABDOMEN: Soft, Nontender, Nondistended; Bowel sounds present  EXTREMITIES:  2+ Peripheral Pulses, No clubbing, cyanosis, or edema  SKIN: No rashes or lesions  LABS:                        10.1   7.07  )-----------( 234      ( 2020 13:55 )             30.8     141  |  105  |  14  ----------------------------<  152<H>  3.4<L>   |  24  |  1.25    PTT - ( 2020 13:55 )  PTT:24.6 sec  Urinalysis Basic - ( 2020 13:55 )    Color: Yellow / Appearance: Clear / S.020 / pH: x  Gluc: x / Ketone: Negative  / Bili: Negative / Urobili: Negative   Blood: x / Protein: 30 mg/dL / Nitrite: Negative   Leuk Esterase: Trace / RBC: 0-2 /HPF / WBC 0-2 /HPF   Sq Epi: x / Non Sq Epi: Many /HPF / Bacteria: Many /HPF    Assessment and plan:  1. Recent CAP with lactic acidosis in a immunocompromised patient   concern for bacteremia  No neutropenia   Cont Levaquin  Follow up blood cx   UA  neg, CXR unchanged  Flu neg   Cont IVF  Trend LA    2. Anemia in the setting of MDS and AIHA  Baseline Hb 7-9, Hb 10 today  Possible dehydration  Cont IVF  Cont home dose prednisone   Monitor FS while on steroids     3. HTN  Cont home dose with holding parameters  Unknown reason for patient to be on Tamsulosin (?renal stone)- please verify med list    4. CKD stage 3  Cont to monitor renal function

## 2020-01-04 NOTE — H&P ADULT - PROBLEM SELECTOR PLAN 1
? from dehydration s/p 2500 bolus c/w 70 cc/hr   ? infection f/u bcx and ucx c/w levaquin for pna   ? from Gabapentin will hold it for now   hypokalemia will replace potassium   TSH 1.35   f/u vitamin d

## 2020-01-04 NOTE — H&P ADULT - NSICDXPASTMEDICALHX_GEN_ALL_CORE_FT
PAST MEDICAL HISTORY:  Anemia     Essential hypertension     Fibroids     H/O rheumatic heart disease     Leukemia     MDS (myelodysplastic syndrome)

## 2020-01-04 NOTE — H&P ADULT - PROBLEM SELECTOR PLAN 6
pt on 60mg daily prednisone  will start pt on ppx bactrim as pt is on high dose steroid   will send cortisol level

## 2020-01-04 NOTE — H&P ADULT - NSHPPHYSICALEXAM_GEN_ALL_CORE
Vital Signs Last 24 Hrs  T(C): 37.3 (04 Jan 2020 16:54), Max: 37.3 (04 Jan 2020 16:54)  T(F): 99.1 (04 Jan 2020 16:54), Max: 99.1 (04 Jan 2020 16:54)  HR: 73 (04 Jan 2020 16:54) (73 - 99)  BP: 117/60 (04 Jan 2020 16:54) (117/60 - 146/80)  BP(mean): --  RR: 18 (04 Jan 2020 16:54) (18 - 20)  SpO2: 100% (04 Jan 2020 16:54) (99% - 100%)    PHYSICAL EXAM:  GENERAL: NAD, well-developed  HEAD:  Atraumatic, Normocephalic, buffalo hump noted likely steroid induced   EYES: EOMI, PERRLA, conjunctiva and sclera clear  NECK: Supple, No JVD  CHEST/LUNG: Clear to auscultation bilaterally; No wheeze  HEART: Regular rate and rhythm; s1+ s2+  ABDOMEN: Soft, Nontender, Nondistended; Bowel sounds present, vertical scar present   EXTREMITIES:, No clubbing, cyanosis, or edema  NEUROLOGY:AAOx3 non-focal  SKIN: No rashes or lesions

## 2020-01-04 NOTE — H&P ADULT - HISTORY OF PRESENT ILLNESS
Patient is 73 y/o F with a significant PMHx of leukemia, last chemo on November, on antibiotics for PNA, presents to the ED with complaints of uncontrollable shaking since yesterday associated with lightheadedness when she tries to get up from the bed. Patient states she is unable to get a full night's sleep due to symptoms. Patient discharged 3 days ago from [place] for treatment of PNA. Patient states she is feeling fine. Denies shortness of breath, chest pain, fever or sweats, n/v/d, abdominal pain, urinary problem, fall, loc, back pain. Patient is chronic prednisone. Pt drinks 1 cup of coffee daily.     Code status full code

## 2020-01-05 LAB
24R-OH-CALCIDIOL SERPL-MCNC: 33.9 NG/ML — SIGNIFICANT CHANGE UP (ref 30–80)
ALBUMIN SERPL ELPH-MCNC: 3.4 G/DL — LOW (ref 3.5–5)
ALP SERPL-CCNC: 32 U/L — LOW (ref 40–120)
ALT FLD-CCNC: 17 U/L DA — SIGNIFICANT CHANGE UP (ref 10–60)
ANION GAP SERPL CALC-SCNC: 6 MMOL/L — SIGNIFICANT CHANGE UP (ref 5–17)
AST SERPL-CCNC: 9 U/L — LOW (ref 10–40)
BILIRUB SERPL-MCNC: 2.2 MG/DL — HIGH (ref 0.2–1.2)
BUN SERPL-MCNC: 11 MG/DL — SIGNIFICANT CHANGE UP (ref 7–18)
CALCIUM SERPL-MCNC: 8.6 MG/DL — SIGNIFICANT CHANGE UP (ref 8.4–10.5)
CHLORIDE SERPL-SCNC: 110 MMOL/L — HIGH (ref 96–108)
CO2 SERPL-SCNC: 26 MMOL/L — SIGNIFICANT CHANGE UP (ref 22–31)
CORTIS AM PEAK SERPL-MCNC: 5 UG/DL — LOW (ref 6–18.4)
CREAT SERPL-MCNC: 0.88 MG/DL — SIGNIFICANT CHANGE UP (ref 0.5–1.3)
CULTURE RESULTS: SIGNIFICANT CHANGE UP
GLUCOSE SERPL-MCNC: 78 MG/DL — SIGNIFICANT CHANGE UP (ref 70–99)
HCT VFR BLD CALC: 25.6 % — LOW (ref 34.5–45)
HGB BLD-MCNC: 8.1 G/DL — LOW (ref 11.5–15.5)
MAGNESIUM SERPL-MCNC: 2.2 MG/DL — SIGNIFICANT CHANGE UP (ref 1.6–2.6)
MCHC RBC-ENTMCNC: 31.5 PG — SIGNIFICANT CHANGE UP (ref 27–34)
MCHC RBC-ENTMCNC: 31.6 GM/DL — LOW (ref 32–36)
MCV RBC AUTO: 99.6 FL — SIGNIFICANT CHANGE UP (ref 80–100)
NRBC # BLD: 7 /100 WBCS — HIGH (ref 0–0)
PHOSPHATE SERPL-MCNC: 2.8 MG/DL — SIGNIFICANT CHANGE UP (ref 2.5–4.5)
PLATELET # BLD AUTO: 187 K/UL — SIGNIFICANT CHANGE UP (ref 150–400)
POTASSIUM SERPL-MCNC: 3.7 MMOL/L — SIGNIFICANT CHANGE UP (ref 3.5–5.3)
POTASSIUM SERPL-SCNC: 3.7 MMOL/L — SIGNIFICANT CHANGE UP (ref 3.5–5.3)
PROT SERPL-MCNC: 6.1 G/DL — SIGNIFICANT CHANGE UP (ref 6–8.3)
RBC # BLD: 2.57 M/UL — LOW (ref 3.8–5.2)
RBC # FLD: 21.2 % — HIGH (ref 10.3–14.5)
SODIUM SERPL-SCNC: 142 MMOL/L — SIGNIFICANT CHANGE UP (ref 135–145)
SPECIMEN SOURCE: SIGNIFICANT CHANGE UP
WBC # BLD: 5.05 K/UL — SIGNIFICANT CHANGE UP (ref 3.8–10.5)
WBC # FLD AUTO: 5.05 K/UL — SIGNIFICANT CHANGE UP (ref 3.8–10.5)

## 2020-01-05 PROCEDURE — 74176 CT ABD & PELVIS W/O CONTRAST: CPT | Mod: 26

## 2020-01-05 PROCEDURE — 99233 SBSQ HOSP IP/OBS HIGH 50: CPT | Mod: GC

## 2020-01-05 RX ORDER — ACETAMINOPHEN 500 MG
650 TABLET ORAL ONCE
Refills: 0 | Status: COMPLETED | OUTPATIENT
Start: 2020-01-05 | End: 2020-01-05

## 2020-01-05 RX ORDER — CEFTRIAXONE 500 MG/1
1000 INJECTION, POWDER, FOR SOLUTION INTRAMUSCULAR; INTRAVENOUS EVERY 24 HOURS
Refills: 0 | Status: DISCONTINUED | OUTPATIENT
Start: 2020-01-05 | End: 2020-01-06

## 2020-01-05 RX ORDER — LANOLIN ALCOHOL/MO/W.PET/CERES
3 CREAM (GRAM) TOPICAL AT BEDTIME
Refills: 0 | Status: DISCONTINUED | OUTPATIENT
Start: 2020-01-05 | End: 2020-01-08

## 2020-01-05 RX ADMIN — Medication 25 MILLIGRAM(S): at 17:50

## 2020-01-05 RX ADMIN — Medication 650 MILLIGRAM(S): at 02:24

## 2020-01-05 RX ADMIN — TAMSULOSIN HYDROCHLORIDE 0.4 MILLIGRAM(S): 0.4 CAPSULE ORAL at 23:09

## 2020-01-05 RX ADMIN — Medication 3 MILLIGRAM(S): at 23:13

## 2020-01-05 RX ADMIN — Medication 25 MILLIGRAM(S): at 05:52

## 2020-01-05 RX ADMIN — Medication 40 MILLIEQUIVALENT(S): at 02:24

## 2020-01-05 RX ADMIN — SODIUM CHLORIDE 75 MILLILITER(S): 9 INJECTION INTRAMUSCULAR; INTRAVENOUS; SUBCUTANEOUS at 06:55

## 2020-01-05 RX ADMIN — Medication 650 MILLIGRAM(S): at 03:20

## 2020-01-05 RX ADMIN — ENOXAPARIN SODIUM 40 MILLIGRAM(S): 100 INJECTION SUBCUTANEOUS at 15:18

## 2020-01-05 RX ADMIN — Medication 60 MILLIGRAM(S): at 05:52

## 2020-01-05 RX ADMIN — CEFTRIAXONE 100 MILLIGRAM(S): 500 INJECTION, POWDER, FOR SOLUTION INTRAMUSCULAR; INTRAVENOUS at 15:17

## 2020-01-05 RX ADMIN — Medication 1 TABLET(S): at 15:18

## 2020-01-05 RX ADMIN — AMLODIPINE BESYLATE 10 MILLIGRAM(S): 2.5 TABLET ORAL at 05:52

## 2020-01-05 NOTE — PROGRESS NOTE ADULT - PROBLEM SELECTOR PLAN 3
likely Diuretics induced   s/p 2500cc ivf   will c/w 70 cc/hr   b/p bmp in am likely Diuretics induced   Back to baseline  s/p 2500cc ivf   will c/w 70 cc/hr   BMP monitoring daily

## 2020-01-05 NOTE — PROGRESS NOTE ADULT - PROBLEM SELECTOR PLAN 5
pt on metoprolol, amlodipine, lasix   holding Lasix 2/2 dehydration pt on metoprolol, amlodipine, lasix   holding Lasix 2/2 dehydration  BP controlled now

## 2020-01-05 NOTE — PROGRESS NOTE ADULT - PROBLEM SELECTOR PLAN 6
pt on 60mg daily prednisone  will start pt on ppx bactrim as pt is on high dose steroid   will send cortisol level pt on 60mg daily prednisone  will start pt on ppx bactrim as pt is on high dose steroid   f/u cortisol level

## 2020-01-05 NOTE — PROGRESS NOTE ADULT - SUBJECTIVE AND OBJECTIVE BOX
PGY 1 Note discussed with primary attending    Patient is a 75y old  Female who presents with a chief complaint of Tremors (2020 21:38)      INTERVAL HPI/OVERNIGHT EVENTS: offers no new complaints; current symptoms resolving    MEDICATIONS  (STANDING):  amLODIPine   Tablet 10 milliGRAM(s) Oral daily  enoxaparin Injectable 40 milliGRAM(s) SubCutaneous daily  levoFLOXacin IVPB 500 milliGRAM(s) IV Intermittent every 24 hours  metoprolol tartrate 25 milliGRAM(s) Oral two times a day  predniSONE   Tablet 60 milliGRAM(s) Oral daily  sodium chloride 0.9%. 1000 milliLiter(s) (75 mL/Hr) IV Continuous <Continuous>  tamsulosin 0.4 milliGRAM(s) Oral at bedtime  trimethoprim  160 mG/sulfamethoxazole 800 mG 1 Tablet(s) Oral daily    MEDICATIONS  (PRN):      __________________________________________________  REVIEW OF SYSTEMS:    CONSTITUTIONAL: No fever,   EYES: no acute visual disturbances  NECK: No pain or stiffness  RESPIRATORY: No cough; No shortness of breath  CARDIOVASCULAR: No chest pain, no palpitations  GASTROINTESTINAL: No pain. No nausea or vomiting; No diarrhea   NEUROLOGICAL: No headache or numbness, no tremors  MUSCULOSKELETAL: No joint pain, no muscle pain  GENITOURINARY: no dysuria, no frequency, no hesitancy  PSYCHIATRY: no depression , no anxiety  ALL OTHER  ROS negative        Vital Signs Last 24 Hrs  T(C): 36.8 (2020 04:44), Max: 37.3 (2020 16:54)  T(F): 98.2 (2020 04:44), Max: 99.1 (2020 16:54)  HR: 61 (2020 04:44) (61 - 99)  BP: 117/71 (2020 04:44) (117/60 - 154/68)  BP(mean): --  RR: 16 (2020 04:44) (16 - 20)  SpO2: 98% (2020 04:44) (95% - 100%)    ________________________________________________  PHYSICAL EXAM:  GENERAL: NAD  HEENT: Normocephalic;  conjunctivae and sclerae clear; moist mucous membranes;   NECK : supple  CHEST/LUNG: Clear to auscultation bilaterally with good air entry   HEART: S1 S2  regular; no murmurs, gallops or rubs  ABDOMEN: Soft, Nontender, Nondistended; Bowel sounds present  EXTREMITIES: no cyanosis; no edema; no calf tenderness  SKIN: warm and dry; no rash  NERVOUS SYSTEM:  Awake and alert; Oriented  to place, person and time ; no new deficits    _________________________________________________  LABS:                        10.1   7.07  )-----------( 234      ( 2020 13:55 )             30.8     01-05    142  |  110<H>  |  11  ----------------------------<  78  3.7   |  26  |  0.88    Ca    8.6      2020 06:43  Phos  2.8     -  Mg     2.2     -    TPro  6.1  /  Alb  3.4<L>  /  TBili  2.2<H>  /  DBili  x   /  AST  9<L>  /  ALT  17  /  AlkPhos  32<L>  -05    PT/INR - ( 2020 13:55 )   PT: 13.9 sec;   INR: 1.24 ratio         PTT - ( 2020 13:55 )  PTT:24.6 sec  Urinalysis Basic - ( 2020 13:55 )    Color: Yellow / Appearance: Clear / S.020 / pH: x  Gluc: x / Ketone: Negative  / Bili: Negative / Urobili: Negative   Blood: x / Protein: 30 mg/dL / Nitrite: Negative   Leuk Esterase: Trace / RBC: 0-2 /HPF / WBC 0-2 /HPF   Sq Epi: x / Non Sq Epi: Many /HPF / Bacteria: Many /HPF      CAPILLARY BLOOD GLUCOSE            RADIOLOGY & ADDITIONAL TESTS:    Imaging Personally Reviewed:  YES/NO    Consultant(s) Notes Reviewed:   YES/ No    Care Discussed with Consultants :     Plan of care was discussed with patient and /or primary care giver; all questions and concerns were addressed and care was aligned with patient's wishes. PGY 1 Note discussed with primary attending    Patient is a 75y old  Female who presents with a chief complaint of Tremors (2020 21:38)      INTERVAL HPI/OVERNIGHT EVENTS:   No acute event overnight reported by overnight team and nurses. Pt remained hemodynamically stable.  Pt seen and examined  at bed side. All concerns and questions answered. Pt still has mild tremors in her hands  Report no new complaint. Pt denies any fever, nausea, vomiting.  Explained all test results and plan.  Appreciate all consults.     MEDICATIONS  (STANDING):  amLODIPine   Tablet 10 milliGRAM(s) Oral daily  enoxaparin Injectable 40 milliGRAM(s) SubCutaneous daily  levoFLOXacin IVPB 500 milliGRAM(s) IV Intermittent every 24 hours  metoprolol tartrate 25 milliGRAM(s) Oral two times a day  predniSONE   Tablet 60 milliGRAM(s) Oral daily  sodium chloride 0.9%. 1000 milliLiter(s) (75 mL/Hr) IV Continuous <Continuous>  tamsulosin 0.4 milliGRAM(s) Oral at bedtime  trimethoprim  160 mG/sulfamethoxazole 800 mG 1 Tablet(s) Oral daily    MEDICATIONS  (PRN):      __________________________________________________  REVIEW OF SYSTEMS:    CONSTITUTIONAL: No fever, cushinoid appearance , obese pt  EYES: no acute visual disturbances  NECK: No pain or stiffness  RESPIRATORY: No cough; No shortness of breath  CARDIOVASCULAR: No chest pain, no palpitations  GASTROINTESTINAL: No pain. No nausea or vomiting; No diarrhea   NEUROLOGICAL: No headache or numbness, no tremors  MUSCULOSKELETAL: No joint pain, no muscle pain  GENITOURINARY: no dysuria, no frequency, no hesitancy  PSYCHIATRY: no depression , no anxiety  ALL OTHER  ROS negative        Vital Signs Last 24 Hrs  T(C): 36.8 (2020 04:44), Max: 37.3 (2020 16:54)  T(F): 98.2 (2020 04:44), Max: 99.1 (2020 16:54)  HR: 61 (2020 04:44) (61 - 99)  BP: 117/71 (2020 04:44) (117/60 - 154/68)  BP(mean): --  RR: 16 (2020 04:44) (16 - 20)  SpO2: 98% (2020 04:44) (95% - 100%)    ________________________________________________  PHYSICAL EXAM:  GENERAL: NAD  HEENT: Normocephalic;  conjunctivae and sclerae clear; moist mucous membranes;   NECK : supple  CHEST/LUNG: Clear to auscultation bilaterally with good air entry   HEART: S1 S2  regular; no murmurs, gallops or rubs  ABDOMEN: Soft, Nontender, Nondistended; Bowel sounds present  EXTREMITIES: no cyanosis; no edema; no calf tenderness , tremors in hands  SKIN: warm and dry; no rash  NERVOUS SYSTEM:  Awake and alert; Oriented  to place, person and time ; no new deficits    _________________________________________________  LABS:                        10.1   7.07  )-----------( 234      ( 2020 13:55 )             30.8     01-05    142  |  110<H>  |  11  ----------------------------<  78  3.7   |  26  |  0.88    Ca    8.6      2020 06:43  Phos  2.8     01-05  Mg     2.2     01-05    TPro  6.1  /  Alb  3.4<L>  /  TBili  2.2<H>  /  DBili  x   /  AST  9<L>  /  ALT  17  /  AlkPhos  32<L>  01-05    PT/INR - ( 2020 13:55 )   PT: 13.9 sec;   INR: 1.24 ratio         PTT - ( 2020 13:55 )  PTT:24.6 sec  Urinalysis Basic - ( 2020 13:55 )    Color: Yellow / Appearance: Clear / S.020 / pH: x  Gluc: x / Ketone: Negative  / Bili: Negative / Urobili: Negative   Blood: x / Protein: 30 mg/dL / Nitrite: Negative   Leuk Esterase: Trace / RBC: 0-2 /HPF / WBC 0-2 /HPF   Sq Epi: x / Non Sq Epi: Many /HPF / Bacteria: Many /HPF      CAPILLARY BLOOD GLUCOSE            RADIOLOGY & ADDITIONAL TESTS:    Imaging Personally Reviewed:  YES    Consultant(s) Notes Reviewed:   YES/     Care Discussed with Consultants :     Plan of care was discussed with patient and /or primary care giver; all questions and concerns were addressed and care was aligned with patient's wishes. PGY 1 Note discussed with primary attending    Patient is a 75y old  Female who presents with a chief complaint of Tremors (2020 21:38)      INTERVAL HPI/OVERNIGHT EVENTS:   No acute event overnight reported by overnight team and nurses. Pt remained hemodynamically stable.  Pt seen and examined  at bed side. All concerns and questions answered. Pt still has mild tremors in her hands  Report something in the urine but not sure. Pt denies any fever, nausea, vomiting.  Explained all test results and plan.  Appreciate all consults.     MEDICATIONS  (STANDING):  amLODIPine   Tablet 10 milliGRAM(s) Oral daily  enoxaparin Injectable 40 milliGRAM(s) SubCutaneous daily  levoFLOXacin IVPB 500 milliGRAM(s) IV Intermittent every 24 hours  metoprolol tartrate 25 milliGRAM(s) Oral two times a day  predniSONE   Tablet 60 milliGRAM(s) Oral daily  sodium chloride 0.9%. 1000 milliLiter(s) (75 mL/Hr) IV Continuous <Continuous>  tamsulosin 0.4 milliGRAM(s) Oral at bedtime  trimethoprim  160 mG/sulfamethoxazole 800 mG 1 Tablet(s) Oral daily    MEDICATIONS  (PRN):      __________________________________________________  REVIEW OF SYSTEMS:    CONSTITUTIONAL: No fever, cushinoid appearance , obese pt  EYES: no acute visual disturbances  NECK: No pain or stiffness  RESPIRATORY: No cough; No shortness of breath  CARDIOVASCULAR: No chest pain, no palpitations  GASTROINTESTINAL: No pain. No nausea or vomiting; No diarrhea   NEUROLOGICAL: No headache or numbness, no tremors  MUSCULOSKELETAL: No joint pain, no muscle pain  GENITOURINARY: no dysuria, no frequency, no hesitancy  PSYCHIATRY: no depression , no anxiety  ALL OTHER  ROS negative        Vital Signs Last 24 Hrs  T(C): 36.8 (2020 04:44), Max: 37.3 (2020 16:54)  T(F): 98.2 (2020 04:44), Max: 99.1 (2020 16:54)  HR: 61 (2020 04:44) (61 - 99)  BP: 117/71 (2020 04:44) (117/60 - 154/68)  BP(mean): --  RR: 16 (2020 04:44) (16 - 20)  SpO2: 98% (2020 04:44) (95% - 100%)    ________________________________________________  PHYSICAL EXAM:  GENERAL: NAD  HEENT: Normocephalic;  conjunctivae and sclerae clear; moist mucous membranes;   NECK : supple  CHEST/LUNG: Clear to auscultation bilaterally with good air entry   HEART: S1 S2  regular; no murmurs, gallops or rubs  ABDOMEN: Soft, Nontender, Nondistended; Bowel sounds present  EXTREMITIES: no cyanosis; no edema; no calf tenderness , tremors in hands  SKIN: warm and dry; no rash  NERVOUS SYSTEM:  Awake and alert; Oriented  to place, person and time ; no new deficits    _________________________________________________  LABS:                        10.1   7.07  )-----------( 234      ( 2020 13:55 )             30.8     01-05    142  |  110<H>  |  11  ----------------------------<  78  3.7   |  26  |  0.88    Ca    8.6      2020 06:43  Phos  2.8     01-05  Mg     2.2     01-05    TPro  6.1  /  Alb  3.4<L>  /  TBili  2.2<H>  /  DBili  x   /  AST  9<L>  /  ALT  17  /  AlkPhos  32<L>  01-05    PT/INR - ( 2020 13:55 )   PT: 13.9 sec;   INR: 1.24 ratio         PTT - ( 2020 13:55 )  PTT:24.6 sec  Urinalysis Basic - ( 2020 13:55 )    Color: Yellow / Appearance: Clear / S.020 / pH: x  Gluc: x / Ketone: Negative  / Bili: Negative / Urobili: Negative   Blood: x / Protein: 30 mg/dL / Nitrite: Negative   Leuk Esterase: Trace / RBC: 0-2 /HPF / WBC 0-2 /HPF   Sq Epi: x / Non Sq Epi: Many /HPF / Bacteria: Many /HPF      CAPILLARY BLOOD GLUCOSE            RADIOLOGY & ADDITIONAL TESTS:    Imaging Personally Reviewed:  YES    Consultant(s) Notes Reviewed:   YES/     Care Discussed with Consultants :     Plan of care was discussed with patient and /or primary care giver; all questions and concerns were addressed and care was aligned with patient's wishes.

## 2020-01-05 NOTE — PROGRESS NOTE ADULT - PROBLEM SELECTOR PLAN 1
? from dehydration s/p 2500 bolus c/w 70 cc/hr   ? infection f/u bcx and ucx c/w levaquin for pna   ? from Gabapentin will hold it for now   hypokalemia will replace potassium   TSH 1.35   f/u vitamin d pt presented to hospital after recent discharge with shaking in extremities, from 1-possible dehydration (s/p 2500 bolus c/w 70 cc/hr )  2- infection  3- gabapentin?   f/u bcx and ucx (c/w levaquin for pna )  hypokalemia will replace potassium   TSH 1.35   f/u vitamin d pt presented to hospital after recent discharge with shaking in extremities, from 1-possible dehydration (s/p 2500 bolus c/w 70 cc/hr )  2- infection  3- gabapentin?   f/u bcx and ucx  Will change to levawuin as previous CT scan showed scott nephric stranding and ureteral stone so pt can be having pyelonephritis too  Pt denies dysuria or burning micturition  hypokalemia will replace potassium   TSH 1.35   f/u vitamin d pt presented to hospital after recent discharge with shaking in extremities, from 1-possible dehydration (s/p 2500 bolus c/w 70 cc/hr )  2- infection  3- gabapentin?   f/u bcx and ucx  Will change to Levaquin as previous CT scan showed scott nephric stranding and ureteral stone so pt can be having pyelonephritis too  Pt denies dysuria or burning micturition  hypokalemia will replace potassium   TSH 1.35   f/u vitamin d

## 2020-01-05 NOTE — PROGRESS NOTE ADULT - PROBLEM SELECTOR PLAN 2
likely 2/2 dehydration  5.9 s/p 2500 cc ivf   will repeat lactate 01/05 Its 1.1   likely 2/2 dehydration  5.9 s/p 2500 cc ivf

## 2020-01-05 NOTE — PROGRESS NOTE ADULT - PROBLEM SELECTOR PLAN 4
pt on chemo   last chemo was 2 months ago pt on chemo   last chemo was 2 months ago  No neutropenia noted

## 2020-01-06 DIAGNOSIS — G25.0 ESSENTIAL TREMOR: ICD-10-CM

## 2020-01-06 LAB
ALBUMIN SERPL ELPH-MCNC: 3.2 G/DL — LOW (ref 3.5–5)
ALP SERPL-CCNC: 33 U/L — LOW (ref 40–120)
ALT FLD-CCNC: 16 U/L DA — SIGNIFICANT CHANGE UP (ref 10–60)
ANION GAP SERPL CALC-SCNC: 8 MMOL/L — SIGNIFICANT CHANGE UP (ref 5–17)
AST SERPL-CCNC: 7 U/L — LOW (ref 10–40)
BILIRUB SERPL-MCNC: 1.8 MG/DL — HIGH (ref 0.2–1.2)
BUN SERPL-MCNC: 10 MG/DL — SIGNIFICANT CHANGE UP (ref 7–18)
CALCIUM SERPL-MCNC: 8.9 MG/DL — SIGNIFICANT CHANGE UP (ref 8.4–10.5)
CHLORIDE SERPL-SCNC: 110 MMOL/L — HIGH (ref 96–108)
CO2 SERPL-SCNC: 26 MMOL/L — SIGNIFICANT CHANGE UP (ref 22–31)
CREAT SERPL-MCNC: 0.88 MG/DL — SIGNIFICANT CHANGE UP (ref 0.5–1.3)
GLUCOSE SERPL-MCNC: 84 MG/DL — SIGNIFICANT CHANGE UP (ref 70–99)
HCT VFR BLD CALC: 26.5 % — LOW (ref 34.5–45)
HGB BLD-MCNC: 8.4 G/DL — LOW (ref 11.5–15.5)
MCHC RBC-ENTMCNC: 31.6 PG — SIGNIFICANT CHANGE UP (ref 27–34)
MCHC RBC-ENTMCNC: 31.7 GM/DL — LOW (ref 32–36)
MCV RBC AUTO: 99.6 FL — SIGNIFICANT CHANGE UP (ref 80–100)
NRBC # BLD: 6 /100 WBCS — HIGH (ref 0–0)
PLATELET # BLD AUTO: 203 K/UL — SIGNIFICANT CHANGE UP (ref 150–400)
POTASSIUM SERPL-MCNC: 3.3 MMOL/L — LOW (ref 3.5–5.3)
POTASSIUM SERPL-SCNC: 3.3 MMOL/L — LOW (ref 3.5–5.3)
PROT SERPL-MCNC: 6 G/DL — SIGNIFICANT CHANGE UP (ref 6–8.3)
RBC # BLD: 2.66 M/UL — LOW (ref 3.8–5.2)
RBC # FLD: 21.2 % — HIGH (ref 10.3–14.5)
SODIUM SERPL-SCNC: 144 MMOL/L — SIGNIFICANT CHANGE UP (ref 135–145)
WBC # BLD: 5.39 K/UL — SIGNIFICANT CHANGE UP (ref 3.8–10.5)
WBC # FLD AUTO: 5.39 K/UL — SIGNIFICANT CHANGE UP (ref 3.8–10.5)

## 2020-01-06 PROCEDURE — 99233 SBSQ HOSP IP/OBS HIGH 50: CPT | Mod: GC

## 2020-01-06 RX ORDER — AMLODIPINE BESYLATE 2.5 MG/1
1 TABLET ORAL
Qty: 0 | Refills: 0 | DISCHARGE

## 2020-01-06 RX ORDER — LANOLIN ALCOHOL/MO/W.PET/CERES
1 CREAM (GRAM) TOPICAL
Qty: 0 | Refills: 0 | DISCHARGE
Start: 2020-01-06

## 2020-01-06 RX ORDER — AMLODIPINE BESYLATE 2.5 MG/1
1 TABLET ORAL
Qty: 0 | Refills: 0 | DISCHARGE
Start: 2020-01-06

## 2020-01-06 RX ORDER — POTASSIUM CHLORIDE 20 MEQ
40 PACKET (EA) ORAL EVERY 4 HOURS
Refills: 0 | Status: COMPLETED | OUTPATIENT
Start: 2020-01-06 | End: 2020-01-06

## 2020-01-06 RX ORDER — METOPROLOL TARTRATE 50 MG
1 TABLET ORAL
Qty: 0 | Refills: 0 | DISCHARGE
Start: 2020-01-06

## 2020-01-06 RX ORDER — TAMSULOSIN HYDROCHLORIDE 0.4 MG/1
1 CAPSULE ORAL
Qty: 0 | Refills: 0 | DISCHARGE
Start: 2020-01-06

## 2020-01-06 RX ADMIN — Medication 25 MILLIGRAM(S): at 05:26

## 2020-01-06 RX ADMIN — Medication 25 MILLIGRAM(S): at 18:00

## 2020-01-06 RX ADMIN — AMLODIPINE BESYLATE 10 MILLIGRAM(S): 2.5 TABLET ORAL at 05:26

## 2020-01-06 RX ADMIN — CEFTRIAXONE 100 MILLIGRAM(S): 500 INJECTION, POWDER, FOR SOLUTION INTRAMUSCULAR; INTRAVENOUS at 14:21

## 2020-01-06 RX ADMIN — ENOXAPARIN SODIUM 40 MILLIGRAM(S): 100 INJECTION SUBCUTANEOUS at 11:55

## 2020-01-06 RX ADMIN — Medication 1 TABLET(S): at 11:55

## 2020-01-06 RX ADMIN — Medication 40 MILLIEQUIVALENT(S): at 18:00

## 2020-01-06 RX ADMIN — Medication 450 MILLIGRAM(S): at 22:10

## 2020-01-06 RX ADMIN — Medication 40 MILLIEQUIVALENT(S): at 14:21

## 2020-01-06 RX ADMIN — Medication 60 MILLIGRAM(S): at 05:26

## 2020-01-06 RX ADMIN — TAMSULOSIN HYDROCHLORIDE 0.4 MILLIGRAM(S): 0.4 CAPSULE ORAL at 22:10

## 2020-01-06 NOTE — PROGRESS NOTE ADULT - PROBLEM SELECTOR PLAN 4
likely Diuretics induced   Back to baseline  s/p 2500cc ivf   will c/w 70 cc/hr   BMP monitoring daily

## 2020-01-06 NOTE — PROGRESS NOTE ADULT - PROBLEM SELECTOR PLAN 2
Pt has high amplitude tremors in her upper extremities, it moves from one upper extremity to other. Not resting or intentional,   Dehydration is ruled out  No neurological deficits  TSH 1.35  BMP is ok, replaced potassium  BCx are negative for infections, No spiking fevers. Will change antibiotics to oral on discharge  will consider giving propranolol as treatment Pt has suspicion of essential tremors too  She was taking prochlorperazine at home for anxiety at home  high amplitude tremors in her upper extremities, it moves from one upper extremity to other. Not resting or intentional,   Dehydration is ruled out  No neurological deficits  TSH 1.35  BMP is ok, replaced potassium  BCx are negative for infections, No spiking fevers. Will change antibiotics to oral on discharge

## 2020-01-06 NOTE — DISCHARGE NOTE PROVIDER - HOSPITAL COURSE
Patient is 75 y/o F with a significant PMHx of leukemia, last chemo on November, on antibiotics for PNA, presents to the ED with complaints of uncontrollable shaking since yesterday associated with lightheadedness when she tries to get up from the bed. Patient states she is unable to get a full night's sleep due to symptoms. Patient discharged 3 days ago from [place] for treatment of PNA. Patient states she is feeling fine. Denies shortness of breath, chest pain, fever or sweats, n/v/d, abdominal pain, urinary problem, fall, loc, back pain. Patient is chronic prednisone. Pt drinks 1 cup of coffee daily. Patient is 73 y/o F with a significant PMHx of leukemia, last chemo on November, on antibiotics for PNA, presents to the ED with complaints of uncontrollable shaking since yesterday associated with lightheadedness when she tries to get up from the bed. Patient states she is unable to get a full night's sleep due to symptoms. Patient discharged 3 days ago from [place] for treatment of PNA. Patient states she is feeling fine. Denies shortness of breath, chest pain, fever or sweats, n/v/d, abdominal pain, urinary problem, fall, loc, back pain. Patient is chronic prednisone. Pt drinks 1 cup of coffee daily.         Shaking chills.   Pt reported tremors but not shaking chills     We ruled out all possible causes of infection like dehydration, medications, infection, metabolic changes.    Pyelonephritis was ruled out as pt was afebrile, cultures negative and Renal usg showed resolved hydronephrosis    No fever was reported    Pt denied dysuria or burning micturition         Essential tremor.  Pt has suspicion of essential tremors too    Pt denied use of anxiolytic use at home    high amplitude tremors in her upper extremities, it moves from one upper extremity to other. Not resting or intentional,     Dehydration was ruled out    No neurological deficits were noted    TSH 1.35    BMP was  ok, We replaced potassium    BCx are negative for infections, No spiking fevers.    Pt can benefit from propranolol         Lactic acidosis.  Pt presented with elevated lactate . It was likely due to dehydration     Came down with IV hydration             Acute kidney injury.      Creatinine was elevated on admission, it was likely diuretic induced, We held the diuretics and gave fluids    Creatinine came back to baseline          Leukemia.   pt is on chemo for leukemia    last chemo was 2 months ago    No neutropenia noted.         Essential hypertension.   metoprolol, amlodipine were continued with adequate BP control    holding Lasix 2/2 dehydration    BP controlled now.         Cushingoid side effect of steroids.   pt takes 60mg daily prednisone     pt was also put on ppx bactrim as pt is on high dose steroid      cortisol level was 5.          Prophylactic measure.  Lovenox 40mg        Pt is stable to be discharged from the hospital. For detailed hospital course, please refer to daily progress notes Patient is 75 y/o F with a significant PMHx of leukemia, last chemo on November, on antibiotics for PNA, presents to the ED with complaints of uncontrollable shaking since yesterday associated with lightheadedness when she tries to get up from the bed. Patient states she is unable to get a full night's sleep due to symptoms. Patient discharged 3 days ago from [place] for treatment of PNA. Patient states she is feeling fine. Denies shortness of breath, chest pain, fever or sweats, n/v/d, abdominal pain, urinary problem, fall, loc, back pain. Patient is chronic prednisone. Pt drinks 1 cup of coffee daily.        Shaking chills: Pt reported tremors but not shaking chills, cultures and initial infectious workup were negative: Pyelonephritis was ruled out as pt was afebrile, cultures negative and Renal usg showed resolved hydronephrosis. No fever was reported.  however pt was noted to have flushing of cheeks with concern for cellulitis possibly 2/2 underlying sinusitis or dental infection requiring clindamycin.         Essential tremor.  Pt has suspicion of essential tremors too, Pt denied use of anxiolytic use at home, high amplitude tremors in her upper extremities, it moves from one upper extremity to other. Not resting or intentional,     Dehydration was ruled out, no neurological deficits were noted. TSH was1.35, replaced potassium. Patient reported improvement.         Lactic acidosis.  Pt presented with elevated lactate . It was likely due to dehydration, resolved with IV hydration            Acute kidney injury: Creatinine was elevated on admission, it was likely diuretic induced, We held the diuretics and gave fluids, Creatinine came back to baseline             Essential hypertension.   metoprolol, amlodipine were continued with adequate BP control, lasix was held 2/2 dehydration. BP controlled now.         Cushingoid side effect of steroids: pt takes 60mg daily prednisone, pt was also put on ppx bactrim as pt is on high dose steroid, cortisol level was 5.                 Pt is stable to be discharged from the hospital. For detailed hospital course, please refer to daily progress notes

## 2020-01-06 NOTE — DISCHARGE NOTE PROVIDER - NSDCMRMEDTOKEN_GEN_ALL_CORE_FT
ALENDRONATE SODIUM 70 MG TABS: 1  orally once a week  amLODIPine 10 mg oral tablet: 1 tab(s) orally once a day  Drisdol 50,000 intl units (1.25 mg) oral capsule: 1 cap(s) orally once a week  ferrous sulfate 325 mg (65 mg elemental iron) oral tablet: 1 tab(s) orally once a day  folic acid 1 mg oral tablet: 1 tab(s) orally once a day  furosemide 40 mg oral tablet: 1 tab(s) orally once a day  melatonin 3 mg oral tablet: 1 tab(s) orally once a day (at bedtime), As needed, Insomnia  metoprolol tartrate 25 mg oral tablet: 1 tab(s) orally 2 times a day  predniSONE 20 mg oral tablet: 3 tab(s) orally once a day  prochlorperazine 10 mg oral tablet: 1 tab(s) orally 4 times a day, As Needed - for anxiety  tamsulosin 0.4 mg oral capsule: 1 cap(s) orally once a day (at bedtime)  Zofran ODT 8 mg oral tablet, disintegratin tab(s) orally 3 times a day, As Needed for nausea/vomiting ALENDRONATE SODIUM 70 MG TABS: 1  orally once a week  amLODIPine 10 mg oral tablet: 1 tab(s) orally once a day  ferrous sulfate 325 mg (65 mg elemental iron) oral tablet: 1 tab(s) orally once a day  folic acid 1 mg oral tablet: 1 tab(s) orally once a day  furosemide 40 mg oral tablet: 1 tab(s) orally once a day  melatonin 3 mg oral tablet: 1 tab(s) orally once a day (at bedtime), As needed, Insomnia  metoprolol tartrate 25 mg oral tablet: 1 tab(s) orally 2 times a day  predniSONE 20 mg oral tablet: 3 tab(s) orally once a day  tamsulosin 0.4 mg oral capsule: 1 cap(s) orally once a day (at bedtime)  Vitamin D2 2000 intl units oral capsule: 1 cap(s) orally once a day   Zofran ODT 8 mg oral tablet, disintegratin tab(s) orally 3 times a day, As Needed for nausea/vomiting ALENDRONATE SODIUM 70 MG TABS: 1  orally once a week  amLODIPine 10 mg oral tablet: 1 tab(s) orally once a day  ferrous sulfate 325 mg (65 mg elemental iron) oral tablet: 1 tab(s) orally once a day  folic acid 1 mg oral tablet: 1 tab(s) orally once a day  furosemide 40 mg oral tablet: 1 tab(s) orally once a day  metoprolol tartrate 25 mg oral tablet: 1 tab(s) orally 2 times a day  predniSONE 20 mg oral tablet: 3 tab(s) orally once a day  sulfamethoxazole-trimethoprim 800 mg-160 mg oral tablet: 1 tab(s) orally once a day MDD:1 tablet  tamsulosin 0.4 mg oral capsule: 1 cap(s) orally once a day (at bedtime)  Vitamin D2 2000 intl units oral capsule: 1 cap(s) orally once a day   Zofran ODT 8 mg oral tablet, disintegratin tab(s) orally 3 times a day, As Needed for nausea/vomiting

## 2020-01-06 NOTE — PROGRESS NOTE ADULT - PROBLEM SELECTOR PLAN 7
pt on 60mg daily prednisone   pt is on ppx bactrim as pt is on high dose steroid    cortisol level 5

## 2020-01-06 NOTE — DISCHARGE NOTE PROVIDER - NSDCCPCAREPLAN_GEN_ALL_CORE_FT
PRINCIPAL DISCHARGE DIAGNOSIS  Diagnosis: Shaking chills  Assessment and Plan of Treatment:       SECONDARY DISCHARGE DIAGNOSES  Diagnosis: Essential tremor  Assessment and Plan of Treatment: Essential tremor    Diagnosis: Cushingoid side effect of steroids  Assessment and Plan of Treatment: Cushingoid side effect of steroids    Diagnosis: Lactic acidosis  Assessment and Plan of Treatment:     Diagnosis: Acute kidney injury  Assessment and Plan of Treatment: PRINCIPAL DISCHARGE DIAGNOSIS  Diagnosis: Shaking chills  Assessment and Plan of Treatment: You came to hospital with shaking chills and tremors in your hands. As you are taking steroids for MDS, we started you on antibiotics to rule out pneumonia due to immunosupression.   We also gave you antibiotics to treat underlying infection of gums that can lead to chills.   Please continue taking medication regularly as prescribed      SECONDARY DISCHARGE DIAGNOSES  Diagnosis: Essential tremor  Assessment and Plan of Treatment: You presented to the hospital with shaking chills and tremors in your hands. We started you on IV antibiotics which relieved the chills but tremors are there.  It is recommended to keep yourself relaxed.      Diagnosis: Cushingoid side effect of steroids  Assessment and Plan of Treatment: You have a history of MDS and taking oral steroids. There is a side effect of steroids that can given a moon like face . At this point, taking steroids is recommended as part of therapy.   Please do not stop using steroids immediately as it can collapse your circulatory system    Diagnosis: Lactic acidosis  Assessment and Plan of Treatment: Your lactate levels were high on admission. With IV fluids it came down    Diagnosis: Essential hypertension  Assessment and Plan of Treatment: Your blood pressure was well-controlled during your admission. Continue with your current regimen of anti-hypertensive medications as mentioned in your discharge summary and ensure that you follow up with your primary care provider for further recommendations and monitoring.    Diagnosis: Acute kidney injury  Assessment and Plan of Treatment: - You presented with Elevated Creatinine      - Please continue oral hydration as much as possible within the daily drinking limit of 2 L per day  - You are medically stable to be discharged from the hospital.  - You need to follow up with your Primary Care Physician   - You need to continue your medications regularly as prescribed. PRINCIPAL DISCHARGE DIAGNOSIS  Diagnosis: Shaking chills  Assessment and Plan of Treatment: You came to hospital with shaking chills and tremors in your hands. As you are taking steroids for MDS, we started you on antibiotics to rule out pneumonia due to immunosupression.   We also gave you antibiotics to treat underlying infection of gums that can lead to chills.   Please continue taking medication regularly as prescribed      SECONDARY DISCHARGE DIAGNOSES  Diagnosis: Essential hypertension  Assessment and Plan of Treatment: Your blood pressure was well-controlled during your admission. Continue with your current regimen of anti-hypertensive medications as mentioned in your discharge summary and ensure that you follow up with your primary care provider for further recommendations and monitoring.    Diagnosis: Cushingoid side effect of steroids  Assessment and Plan of Treatment: You have a history of MDS and taking oral steroids. There is a side effect of steroids that can given a moon like face . At this point, taking steroids is recommended as part of therapy.   Please do not stop using steroids immediately as it can collapse your circulatory system. You are recommended to take bactrim as precribed daily for prevention of treatmentas steroids predispose to the same.    Diagnosis: Essential tremor  Assessment and Plan of Treatment: You presented to the hospital with shaking chills and tremors in your hands. We started you on IV antibiotics which relieved the chills but tremors are there.  It is recommended to keep yourself relaxed.      Diagnosis: Lactic acidosis  Assessment and Plan of Treatment: Your lactate levels were high on admission. With IV fluids it came down    Diagnosis: Acute kidney injury  Assessment and Plan of Treatment: - You presented with Elevated Creatinine      - Please continue oral hydration as much as possible within the daily drinking limit of 2 L per day  - You are medically stable to be discharged from the hospital.  - You need to follow up with your Primary Care Physician   - You need to continue your medications regularly as prescribed.

## 2020-01-06 NOTE — PROGRESS NOTE ADULT - PROBLEM SELECTOR PLAN 1
Pt reports tremors but not shaking chills  pyelonephritis is rule out as pt is afebrile, cultures negative and Real usg shows resolved hydronephrosis  2- infection  3- gabapentin?  Pt denies dysuria or burning micturition  hypokalemia will replace potassium   TSH 1.35   f/u vitamin 33

## 2020-01-06 NOTE — PROGRESS NOTE ADULT - SUBJECTIVE AND OBJECTIVE BOX
PGY 1 Note discussed with primary attending    Patient is a 75y old  Female who presents with a chief complaint of Tremors (2020 11:55)      INTERVAL HPI/OVERNIGHT EVENTS:   No acute event overnight reported by overnight team and nurses. Pt remained hemodynamically stable.  Pt seen and examined  at bed side. All concerns and questions answered.   Report no new complaint. Pt denies any fever, nausea, vomiting.  Explained all test results and plan.  Appreciate all consults.     MEDICATIONS  (STANDING):  amLODIPine   Tablet 10 milliGRAM(s) Oral daily  cefTRIAXone   IVPB 1000 milliGRAM(s) IV Intermittent every 24 hours  enoxaparin Injectable 40 milliGRAM(s) SubCutaneous daily  metoprolol tartrate 25 milliGRAM(s) Oral two times a day  potassium chloride    Tablet ER 40 milliEquivalent(s) Oral every 4 hours  predniSONE   Tablet 60 milliGRAM(s) Oral daily  sodium chloride 0.9%. 1000 milliLiter(s) (75 mL/Hr) IV Continuous <Continuous>  tamsulosin 0.4 milliGRAM(s) Oral at bedtime  trimethoprim  160 mG/sulfamethoxazole 800 mG 1 Tablet(s) Oral daily    MEDICATIONS  (PRN):  melatonin 3 milliGRAM(s) Oral at bedtime PRN Insomnia      __________________________________________________  REVIEW OF SYSTEMS:    CONSTITUTIONAL: No fever,   EYES: no acute visual disturbances  NECK: No pain or stiffness  RESPIRATORY: No cough; No shortness of breath  CARDIOVASCULAR: No chest pain, no palpitations  GASTROINTESTINAL: No pain. No nausea or vomiting; No diarrhea   NEUROLOGICAL: No headache or numbness, no tremors  MUSCULOSKELETAL: No joint pain, no muscle pain  GENITOURINARY: no dysuria, no frequency, no hesitancy  PSYCHIATRY: no depression , no anxiety  ALL OTHER  ROS negative        Vital Signs Last 24 Hrs  T(C): 36.8 (2020 05:09), Max: 36.9 (2020 21:28)  T(F): 98.3 (2020 05:09), Max: 98.4 (2020 21:28)  HR: 64 (2020 05:09) (64 - 79)  BP: 135/50 (2020 05:09) (135/50 - 149/72)  BP(mean): --  RR: 17 (2020 05:09) (16 - 17)  SpO2: 100% (2020 05:09) (98% - 100%)    ________________________________________________  PHYSICAL EXAM:  GENERAL: NAD  HEENT: Normocephalic;  conjunctivae and sclerae clear; moist mucous membranes;   NECK : supple  CHEST/LUNG: Clear to auscultation bilaterally with good air entry   HEART: S1 S2  regular; no murmurs, gallops or rubs  ABDOMEN: Soft, Nontender, Nondistended; Bowel sounds present  EXTREMITIES: no cyanosis; no edema; no calf tenderness  SKIN: warm and dry; no rash  NERVOUS SYSTEM:  Awake and alert; Oriented  to place, person and time ; no new deficits    _________________________________________________  LABS:                        8.4    5.39  )-----------( 203      ( 2020 06:43 )             26.5     01-06    144  |  110<H>  |  10  ----------------------------<  84  3.3<L>   |  26  |  0.88    Ca    8.9      2020 06:43  Phos  2.8     01-05  Mg     2.2     01-05    TPro  6.0  /  Alb  3.2<L>  /  TBili  1.8<H>  /  DBili  x   /  AST  7<L>  /  ALT  16  /  AlkPhos  33<L>  01-06    PT/INR - ( 2020 13:55 )   PT: 13.9 sec;   INR: 1.24 ratio         PTT - ( 2020 13:55 )  PTT:24.6 sec  Urinalysis Basic - ( 2020 13:55 )    Color: Yellow / Appearance: Clear / S.020 / pH: x  Gluc: x / Ketone: Negative  / Bili: Negative / Urobili: Negative   Blood: x / Protein: 30 mg/dL / Nitrite: Negative   Leuk Esterase: Trace / RBC: 0-2 /HPF / WBC 0-2 /HPF   Sq Epi: x / Non Sq Epi: Many /HPF / Bacteria: Many /HPF      CAPILLARY BLOOD GLUCOSE            RADIOLOGY & ADDITIONAL TESTS:    Imaging Personally Reviewed:  YES    Consultant(s) Notes Reviewed:   YES    Care Discussed with Consultants :     Plan of care was discussed with patient and /or primary care giver; all questions and concerns were addressed and care was aligned with patient's wishes. PGY 1 Note discussed with primary attending    Patient is a 75y old  Female who presents with a chief complaint of Tremors (2020 11:55)      INTERVAL HPI/OVERNIGHT EVENTS:   No acute event overnight reported by overnight team and nurses. Pt remained hemodynamically stable.  Pt seen and examined  at bed side. All concerns and questions answered.   Report no new complaint. Pt denies any fever, nausea, vomiting.  Explained all test results and plan.  Appreciate all consults.     MEDICATIONS  (STANDING):  amLODIPine   Tablet 10 milliGRAM(s) Oral daily  cefTRIAXone   IVPB 1000 milliGRAM(s) IV Intermittent every 24 hours  enoxaparin Injectable 40 milliGRAM(s) SubCutaneous daily  metoprolol tartrate 25 milliGRAM(s) Oral two times a day  potassium chloride    Tablet ER 40 milliEquivalent(s) Oral every 4 hours  predniSONE   Tablet 60 milliGRAM(s) Oral daily  sodium chloride 0.9%. 1000 milliLiter(s) (75 mL/Hr) IV Continuous <Continuous>  tamsulosin 0.4 milliGRAM(s) Oral at bedtime  trimethoprim  160 mG/sulfamethoxazole 800 mG 1 Tablet(s) Oral daily    MEDICATIONS  (PRN):  melatonin 3 milliGRAM(s) Oral at bedtime PRN Insomnia      __________________________________________________  REVIEW OF SYSTEMS:    CONSTITUTIONAL: No fever,   EYES: no acute visual disturbances  NECK: No pain or stiffness  RESPIRATORY: No cough; No shortness of breath  CARDIOVASCULAR: No chest pain, no palpitations  GASTROINTESTINAL: No pain. No nausea or vomiting; No diarrhea   NEUROLOGICAL: No headache or numbness, tremors in upper extremity  MUSCULOSKELETAL: No joint pain, no muscle pain  GENITOURINARY: no dysuria, no frequency, no hesitancy  PSYCHIATRY: no depression , no anxiety  ALL OTHER  ROS negative        Vital Signs Last 24 Hrs  T(C): 36.8 (2020 05:09), Max: 36.9 (2020 21:28)  T(F): 98.3 (2020 05:09), Max: 98.4 (2020 21:28)  HR: 64 (2020 05:09) (64 - 79)  BP: 135/50 (2020 05:09) (135/50 - 149/72)  BP(mean): --  RR: 17 (2020 05:09) (16 - 17)  SpO2: 100% (2020 05:09) (98% - 100%)    ________________________________________________  PHYSICAL EXAM:  GENERAL: NAD , cushingoid appearance, red flushing cheeks  HEENT: Normocephalic;  conjunctivae and sclerae clear; moist mucous membranes;   NECK : supple  CHEST/LUNG: Clear to auscultation bilaterally with good air entry   HEART: S1 S2  regular; no murmurs, gallops or rubs  ABDOMEN: Soft, Nontender, Nondistended; Bowel sounds present  EXTREMITIES: no cyanosis; no edema; no calf tenderness  SKIN: warm and dry; no rash  NERVOUS SYSTEM:  Awake and alert; Oriented  to place, person and time ; no new deficits    _________________________________________________  LABS:                        8.4    5.39  )-----------( 203      ( 2020 06:43 )             26.5     01-06    144  |  110<H>  |  10  ----------------------------<  84  3.3<L>   |  26  |  0.88    Ca    8.9      2020 06:43  Phos  2.8     01-05  Mg     2.2     01-05    TPro  6.0  /  Alb  3.2<L>  /  TBili  1.8<H>  /  DBili  x   /  AST  7<L>  /  ALT  16  /  AlkPhos  33<L>  01-06    PT/INR - ( 2020 13:55 )   PT: 13.9 sec;   INR: 1.24 ratio         PTT - ( 2020 13:55 )  PTT:24.6 sec  Urinalysis Basic - ( 2020 13:55 )    Color: Yellow / Appearance: Clear / S.020 / pH: x  Gluc: x / Ketone: Negative  / Bili: Negative / Urobili: Negative   Blood: x / Protein: 30 mg/dL / Nitrite: Negative   Leuk Esterase: Trace / RBC: 0-2 /HPF / WBC 0-2 /HPF   Sq Epi: x / Non Sq Epi: Many /HPF / Bacteria: Many /HPF      CAPILLARY BLOOD GLUCOSE            RADIOLOGY & ADDITIONAL TESTS:    Imaging Personally Reviewed:  YES    Consultant(s) Notes Reviewed:   YES    Care Discussed with Consultants :     Plan of care was discussed with patient and /or primary care giver; all questions and concerns were addressed and care was aligned with patient's wishes.

## 2020-01-06 NOTE — DISCHARGE NOTE PROVIDER - CARE PROVIDER_API CALL
Brown English (MD)  Medicine  [address] [place] [address]  Phone: [phone]  Fax: [phone]  Follow Up Time:

## 2020-01-07 DIAGNOSIS — L03.211 CELLULITIS OF FACE: ICD-10-CM

## 2020-01-07 LAB
ALBUMIN SERPL ELPH-MCNC: 3.3 G/DL — LOW (ref 3.5–5)
ALP SERPL-CCNC: 34 U/L — LOW (ref 40–120)
ALT FLD-CCNC: 17 U/L DA — SIGNIFICANT CHANGE UP (ref 10–60)
ANION GAP SERPL CALC-SCNC: 9 MMOL/L — SIGNIFICANT CHANGE UP (ref 5–17)
AST SERPL-CCNC: 16 U/L — SIGNIFICANT CHANGE UP (ref 10–40)
BASOPHILS # BLD AUTO: 0.02 K/UL — SIGNIFICANT CHANGE UP (ref 0–0.2)
BASOPHILS NFR BLD AUTO: 0.3 % — SIGNIFICANT CHANGE UP (ref 0–2)
BILIRUB SERPL-MCNC: 1.7 MG/DL — HIGH (ref 0.2–1.2)
BUN SERPL-MCNC: 10 MG/DL — SIGNIFICANT CHANGE UP (ref 7–18)
CALCIUM SERPL-MCNC: 9 MG/DL — SIGNIFICANT CHANGE UP (ref 8.4–10.5)
CHLORIDE SERPL-SCNC: 112 MMOL/L — HIGH (ref 96–108)
CO2 SERPL-SCNC: 24 MMOL/L — SIGNIFICANT CHANGE UP (ref 22–31)
CREAT SERPL-MCNC: 0.84 MG/DL — SIGNIFICANT CHANGE UP (ref 0.5–1.3)
EOSINOPHIL # BLD AUTO: 0.02 K/UL — SIGNIFICANT CHANGE UP (ref 0–0.5)
EOSINOPHIL NFR BLD AUTO: 0.3 % — SIGNIFICANT CHANGE UP (ref 0–6)
GLUCOSE SERPL-MCNC: 71 MG/DL — SIGNIFICANT CHANGE UP (ref 70–99)
HCT VFR BLD CALC: 28.2 % — LOW (ref 34.5–45)
HGB BLD-MCNC: 8.8 G/DL — LOW (ref 11.5–15.5)
IMM GRANULOCYTES NFR BLD AUTO: 0.8 % — SIGNIFICANT CHANGE UP (ref 0–1.5)
LYMPHOCYTES # BLD AUTO: 1.95 K/UL — SIGNIFICANT CHANGE UP (ref 1–3.3)
LYMPHOCYTES # BLD AUTO: 32.3 % — SIGNIFICANT CHANGE UP (ref 13–44)
MAGNESIUM SERPL-MCNC: 2.4 MG/DL — SIGNIFICANT CHANGE UP (ref 1.6–2.6)
MCHC RBC-ENTMCNC: 31.2 GM/DL — LOW (ref 32–36)
MCHC RBC-ENTMCNC: 32.1 PG — SIGNIFICANT CHANGE UP (ref 27–34)
MCV RBC AUTO: 102.9 FL — HIGH (ref 80–100)
MONOCYTES # BLD AUTO: 0.85 K/UL — SIGNIFICANT CHANGE UP (ref 0–0.9)
MONOCYTES NFR BLD AUTO: 14.1 % — HIGH (ref 2–14)
NEUTROPHILS # BLD AUTO: 3.15 K/UL — SIGNIFICANT CHANGE UP (ref 1.8–7.4)
NEUTROPHILS NFR BLD AUTO: 52.2 % — SIGNIFICANT CHANGE UP (ref 43–77)
NRBC # BLD: 4 /100 WBCS — HIGH (ref 0–0)
PLATELET # BLD AUTO: 204 K/UL — SIGNIFICANT CHANGE UP (ref 150–400)
POTASSIUM SERPL-MCNC: 4.2 MMOL/L — SIGNIFICANT CHANGE UP (ref 3.5–5.3)
POTASSIUM SERPL-SCNC: 4.2 MMOL/L — SIGNIFICANT CHANGE UP (ref 3.5–5.3)
PROT SERPL-MCNC: 6.2 G/DL — SIGNIFICANT CHANGE UP (ref 6–8.3)
RBC # BLD: 2.74 M/UL — LOW (ref 3.8–5.2)
RBC # FLD: 20.8 % — HIGH (ref 10.3–14.5)
SODIUM SERPL-SCNC: 145 MMOL/L — SIGNIFICANT CHANGE UP (ref 135–145)
WBC # BLD: 6.04 K/UL — SIGNIFICANT CHANGE UP (ref 3.8–10.5)
WBC # FLD AUTO: 6.04 K/UL — SIGNIFICANT CHANGE UP (ref 3.8–10.5)

## 2020-01-07 PROCEDURE — 99232 SBSQ HOSP IP/OBS MODERATE 35: CPT | Mod: GC

## 2020-01-07 RX ORDER — PROCHLORPERAZINE MALEATE 5 MG
1 TABLET ORAL
Qty: 0 | Refills: 0 | DISCHARGE

## 2020-01-07 RX ORDER — ERGOCALCIFEROL 1.25 MG/1
1 CAPSULE ORAL
Qty: 30 | Refills: 0
Start: 2020-01-07 | End: 2020-02-05

## 2020-01-07 RX ADMIN — Medication 60 MILLIGRAM(S): at 06:16

## 2020-01-07 RX ADMIN — AMLODIPINE BESYLATE 10 MILLIGRAM(S): 2.5 TABLET ORAL at 06:16

## 2020-01-07 RX ADMIN — Medication 1 TABLET(S): at 11:57

## 2020-01-07 RX ADMIN — Medication 450 MILLIGRAM(S): at 06:16

## 2020-01-07 RX ADMIN — Medication 450 MILLIGRAM(S): at 21:34

## 2020-01-07 RX ADMIN — Medication 25 MILLIGRAM(S): at 18:00

## 2020-01-07 RX ADMIN — Medication 25 MILLIGRAM(S): at 06:16

## 2020-01-07 RX ADMIN — ENOXAPARIN SODIUM 40 MILLIGRAM(S): 100 INJECTION SUBCUTANEOUS at 11:57

## 2020-01-07 RX ADMIN — TAMSULOSIN HYDROCHLORIDE 0.4 MILLIGRAM(S): 0.4 CAPSULE ORAL at 21:34

## 2020-01-07 RX ADMIN — Medication 450 MILLIGRAM(S): at 13:34

## 2020-01-07 NOTE — PROGRESS NOTE ADULT - PROBLEM SELECTOR PLAN 1
Pt reports tremors but not shaking chills  pyelonephritis is rule out as pt is afebrile, cultures negative and Real usg shows resolved hydronephrosis  Pt denies dysuria or burning micturition  TSH 1.35   Changed antibiotics to clindamycin  f/u vitamin D 33

## 2020-01-07 NOTE — PROGRESS NOTE ADULT - PROBLEM SELECTOR PLAN 3
Resolved   01/05 Its 1.1   likely 2/2 dehydration  5.9 s/p 2500 cc ivf Right cheek erythema is improved after starting clindamycin.

## 2020-01-07 NOTE — PROGRESS NOTE ADULT - SUBJECTIVE AND OBJECTIVE BOX
PGY 1 Note discussed with primary attending    Patient is a 75y old  Female who presents with a chief complaint of Tremors (06 Jan 2020 13:58)      INTERVAL HPI/OVERNIGHT EVENTS:   No acute event overnight reported by overnight team and nurses. Pt remained hemodynamically stable.  Pt seen and examined  at bed side. All concerns and questions answered.   Report no new complaint. Pt denies any fever, nausea, vomiting.  Explained all test results and plan.  Appreciate all consults.     MEDICATIONS  (STANDING):  amLODIPine   Tablet 10 milliGRAM(s) Oral daily  clindamycin   Capsule 450 milliGRAM(s) Oral every 8 hours  enoxaparin Injectable 40 milliGRAM(s) SubCutaneous daily  metoprolol tartrate 25 milliGRAM(s) Oral two times a day  predniSONE   Tablet 60 milliGRAM(s) Oral daily  tamsulosin 0.4 milliGRAM(s) Oral at bedtime  trimethoprim  160 mG/sulfamethoxazole 800 mG 1 Tablet(s) Oral daily    MEDICATIONS  (PRN):  melatonin 3 milliGRAM(s) Oral at bedtime PRN Insomnia      __________________________________________________  REVIEW OF SYSTEMS:    CONSTITUTIONAL: No fever,   EYES: no acute visual disturbances  NECK: No pain or stiffness  RESPIRATORY: No cough; No shortness of breath  CARDIOVASCULAR: No chest pain, no palpitations  GASTROINTESTINAL: No pain. No nausea or vomiting; No diarrhea   NEUROLOGICAL: No headache or numbness, no tremors  MUSCULOSKELETAL: No joint pain, no muscle pain  GENITOURINARY: no dysuria, no frequency, no hesitancy  PSYCHIATRY: no depression , no anxiety  ALL OTHER  ROS negative        Vital Signs Last 24 Hrs  T(C): 36.6 (07 Jan 2020 05:32), Max: 37.1 (06 Jan 2020 21:51)  T(F): 97.9 (07 Jan 2020 05:32), Max: 98.7 (06 Jan 2020 21:51)  HR: 74 (07 Jan 2020 05:32) (68 - 78)  BP: 136/61 (07 Jan 2020 05:32) (120/62 - 136/61)  BP(mean): --  RR: 18 (07 Jan 2020 05:32) (16 - 18)  SpO2: 97% (07 Jan 2020 05:32) (97% - 100%)    ________________________________________________  PHYSICAL EXAM:  GENERAL: NAD , cushingoid appearance  HEENT: Normocephalic;  conjunctivae and sclerae clear; moist mucous membranes;   NECK : supple  CHEST/LUNG: Clear to auscultation bilaterally with good air entry   HEART: S1 S2  regular; no murmurs, gallops or rubs  ABDOMEN: Soft, Nontender, Nondistended; Bowel sounds present  EXTREMITIES: no cyanosis; no edema; no calf tenderness  SKIN: warm and dry; no rash  NERVOUS SYSTEM:  Awake and alert; Oriented  to place, person and time ; no new deficits    _________________________________________________  LABS:                        8.8    6.04  )-----------( 204      ( 07 Jan 2020 06:51 )             28.2     01-07    145  |  112<H>  |  10  ----------------------------<  71  4.2   |  24  |  0.84    Ca    9.0      07 Jan 2020 06:51  Mg     2.4     01-07    TPro  6.2  /  Alb  3.3<L>  /  TBili  1.7<H>  /  DBili  x   /  AST  16  /  ALT  17  /  AlkPhos  34<L>  01-07        CAPILLARY BLOOD GLUCOSE            RADIOLOGY & ADDITIONAL TESTS:    Imaging Personally Reviewed:  YES    Consultant(s) Notes Reviewed:   YES    Care Discussed with Consultants :     Plan of care was discussed with patient and /or primary care giver; all questions and concerns were addressed and care was aligned with patient's wishes.

## 2020-01-07 NOTE — PROGRESS NOTE ADULT - PROBLEM SELECTOR PLAN 6
pt on metoprolol, amlodipine, lasix   holding Lasix 2/2 dehydration  BP controlled now pt on chemo   last chemo was 2 months ago  No neutropenia noted

## 2020-01-07 NOTE — PROGRESS NOTE ADULT - PROBLEM SELECTOR PLAN 5
pt on chemo   last chemo was 2 months ago  No neutropenia noted likely Diuretics induced   Back to baseline  s/p 2500cc ivf   will c/w 70 cc/hr   BMP monitoring daily

## 2020-01-07 NOTE — PROGRESS NOTE ADULT - PROBLEM SELECTOR PLAN 8
IMPROVE VTE Individual Risk Assessment  RISK                                                         Points  [  ] Previous VTE                                      3  [  ] Thrombophilia                                   2  [  ] Lower limb paralysis                         2 (unable to hold up >15 seconds)    [ x ] Current Cancer                                  2       (within 6 months)  [  ] Immobilization > 24 hrs                    1  [  ] ICU/CCU stay > 24 hrs                         1  [ x ] Age > 60                                              1  IMPROVE VTE Score 3  started on lovenox for dvt ppx pt on 60mg daily prednisone   pt is on ppx bactrim as pt is on high dose steroid    cortisol level 5

## 2020-01-07 NOTE — PROGRESS NOTE ADULT - PROBLEM SELECTOR PLAN 2
Pt has suspicion of essential tremors too  She was taking prochlorperazine at home for anxiety at home  high amplitude tremors in her upper extremities, it moves from one upper extremity to other. Not resting or intentional,   Dehydration is ruled out  No neurological deficits  TSH 1.35  BMP is ok, replaced potassium  BCx are negative for infections, No spiking fevers. Will change antibiotics to oral on discharge

## 2020-01-07 NOTE — PROGRESS NOTE ADULT - PROBLEM SELECTOR PLAN 7
pt on 60mg daily prednisone   pt is on ppx bactrim as pt is on high dose steroid    cortisol level 5 pt on metoprolol, amlodipine, lasix   holding Lasix 2/2 dehydration  BP controlled now

## 2020-01-08 VITALS
SYSTOLIC BLOOD PRESSURE: 147 MMHG | DIASTOLIC BLOOD PRESSURE: 75 MMHG | TEMPERATURE: 97 F | HEART RATE: 96 BPM | RESPIRATION RATE: 179 BRPM

## 2020-01-08 PROCEDURE — 82306 VITAMIN D 25 HYDROXY: CPT

## 2020-01-08 PROCEDURE — 71045 X-RAY EXAM CHEST 1 VIEW: CPT

## 2020-01-08 PROCEDURE — 81001 URINALYSIS AUTO W/SCOPE: CPT

## 2020-01-08 PROCEDURE — 85730 THROMBOPLASTIN TIME PARTIAL: CPT

## 2020-01-08 PROCEDURE — 99285 EMERGENCY DEPT VISIT HI MDM: CPT | Mod: 25

## 2020-01-08 PROCEDURE — 87086 URINE CULTURE/COLONY COUNT: CPT

## 2020-01-08 PROCEDURE — 74176 CT ABD & PELVIS W/O CONTRAST: CPT

## 2020-01-08 PROCEDURE — 83735 ASSAY OF MAGNESIUM: CPT

## 2020-01-08 PROCEDURE — 36415 COLL VENOUS BLD VENIPUNCTURE: CPT

## 2020-01-08 PROCEDURE — 99239 HOSP IP/OBS DSCHRG MGMT >30: CPT | Mod: GC

## 2020-01-08 PROCEDURE — 80053 COMPREHEN METABOLIC PANEL: CPT

## 2020-01-08 PROCEDURE — 87631 RESP VIRUS 3-5 TARGETS: CPT

## 2020-01-08 PROCEDURE — 93005 ELECTROCARDIOGRAM TRACING: CPT

## 2020-01-08 PROCEDURE — 85027 COMPLETE CBC AUTOMATED: CPT

## 2020-01-08 PROCEDURE — 85610 PROTHROMBIN TIME: CPT

## 2020-01-08 PROCEDURE — 82533 TOTAL CORTISOL: CPT

## 2020-01-08 PROCEDURE — 83605 ASSAY OF LACTIC ACID: CPT

## 2020-01-08 PROCEDURE — 96365 THER/PROPH/DIAG IV INF INIT: CPT

## 2020-01-08 PROCEDURE — 87040 BLOOD CULTURE FOR BACTERIA: CPT

## 2020-01-08 PROCEDURE — 84100 ASSAY OF PHOSPHORUS: CPT

## 2020-01-08 RX ADMIN — AMLODIPINE BESYLATE 10 MILLIGRAM(S): 2.5 TABLET ORAL at 06:05

## 2020-01-08 RX ADMIN — Medication 60 MILLIGRAM(S): at 06:05

## 2020-01-08 RX ADMIN — Medication 1 TABLET(S): at 12:32

## 2020-01-08 RX ADMIN — ENOXAPARIN SODIUM 40 MILLIGRAM(S): 100 INJECTION SUBCUTANEOUS at 12:32

## 2020-01-08 RX ADMIN — Medication 25 MILLIGRAM(S): at 06:05

## 2020-01-08 RX ADMIN — Medication 450 MILLIGRAM(S): at 06:05

## 2020-01-08 NOTE — PROGRESS NOTE ADULT - ASSESSMENT
Patient is 73 y/o F with a significant PMHx of leukemia, last chemo on November, on antibiotics for PNA, presents to the ED with complaints of uncontrollable shaking since yesterday associated with lightheadedness when she tries to get up from the bed.
Patient is 73 y/o F with a significant PMHx of leukemia, last chemo on November, on antibiotics for PNA, presents to the ED with complaints of uncontrollable shaking since yesterday associated with lightheadedness when she tries to get up from the bed.
Patient is 75 y/o F with a significant PMHx of leukemia, last chemo on November, on antibiotics for PNA, presents to the ED with complaints of uncontrollable shaking since yesterday associated with lightheadedness when she tries to get up from the bed.
Patient is 73 y/o F with a significant PMHx of leukemia, last chemo on November, on antibiotics for PNA, presents to the ED with complaints of uncontrollable shaking since yesterday associated with lightheadedness when she tries to get up from the bed.

## 2020-01-08 NOTE — DISCHARGE NOTE NURSING/CASE MANAGEMENT/SOCIAL WORK - PATIENT PORTAL LINK FT
You can access the FollowMyHealth Patient Portal offered by [facility] by registering at the following website: [email or web address]. By joining [employer]’s FollowMyHealth portal, you will also be able to view your health information using other applications (apps) compatible with our system.

## 2020-01-08 NOTE — PROGRESS NOTE ADULT - ATTENDING COMMENTS
Patient is feeling better.     No fever or chills.  No tremor.     Alert, cooperative woman  Vital Signs Last 24 Hrs  T(C): 36.2 (08 Jan 2020 14:45), Max: 36.8 (07 Jan 2020 21:09)  T(F): 97.2 (08 Jan 2020 14:45), Max: 98.3 (07 Jan 2020 21:09)  HR: 96 (08 Jan 2020 14:45) (64 - 96)  BP: 147/75 (08 Jan 2020 14:45) (126/74 - 147/75)  BP(mean): --  RR: 179 (08 Jan 2020 14:45) (17 - 179)  SpO2: 99% (08 Jan 2020 05:32) (98% - 99%)  Erythema and calor of the right cheek is resolved  Lungs, clear  Cor, RRR  Neurological, intact               8.8    6.04  )-----------( 204      ( 07 Jan 2020 06:51 )             28.2   01-07    145  |  112<H>  |  10  ----------------------------<  71  4.2   |  24  |  0.84    Ca    9.0      07 Jan 2020 06:51  Mg     2.4     01-07    TPro  6.2  /  Alb  3.3<L>  /  TBili  1.7<H>  /  DBili  x   /  AST  16  /  ALT  17  /  AlkPhos  34<L>  01-07    IMP:  Tremors, resolved          Buccal cellulitis, resolved          hematological malignancy with juvenile cells, patient will f/u for BM test as outpatient.   Plan: Discharge home.           Patient will f/u with hematology as outpatient.
74 yo F with pmh of mds, leukemia, rheumatic heart disease who was just discharged with suspected pneumonia on Levaquin. Pt noted tremors in her upper extremities. Pt was admitted to r/o sepsis. Cultures in lab. CT renal stone done due to recent left sided nephrolithiasis with stranding. Results showed resolution of findings. Pt is resumed on Levaquin. Possible DC if cultures are negative.

## 2020-01-08 NOTE — PROGRESS NOTE ADULT - SUBJECTIVE AND OBJECTIVE BOX
MEDICAL ATTENDING NOTE  Patient is a 75y old  Female who presents with a chief complaint of Tremors (07 Jan 2020 13:45)      HPI:  Patient is 75 y/o F with a significant PMHx of leukemia, last chemo on November, on antibiotics for PNA, presents to the ED with complaints of uncontrollable shaking since yesterday associated with lightheadedness when she tries to get up from the bed. Patient states she is unable to get a full night's sleep due to symptoms. Patient discharged 3 days ago from [place] for treatment of PNA. Patient states she is feeling fine. Denies shortness of breath, chest pain, fever or sweats, n/v/d, abdominal pain, urinary problem, fall, loc, back pain. Patient is chronic prednisone. Pt drinks 1 cup of coffee daily.     Code status full code (04 Jan 2020 21:38)      INTERVAL HPI/OVERNIGHT EVENTS: no new complaints    MEDICATIONS  (STANDING):  amLODIPine   Tablet 10 milliGRAM(s) Oral daily  clindamycin   Capsule 450 milliGRAM(s) Oral every 8 hours  enoxaparin Injectable 40 milliGRAM(s) SubCutaneous daily  metoprolol tartrate 25 milliGRAM(s) Oral two times a day  predniSONE   Tablet 60 milliGRAM(s) Oral daily  tamsulosin 0.4 milliGRAM(s) Oral at bedtime  trimethoprim  160 mG/sulfamethoxazole 800 mG 1 Tablet(s) Oral daily    MEDICATIONS  (PRN):  melatonin 3 milliGRAM(s) Oral at bedtime PRN Insomnia      __________________________________________________  REVIEW OF SYSTEMS:    CONSTITUTIONAL: No fever,   EYES: no acute visual disturbances  NECK: No pain or stiffness  RESPIRATORY: No cough; No shortness of breath  CARDIOVASCULAR: No chest pain, no palpitations  GASTROINTESTINAL: No pain. No nausea or vomiting; No diarrhea   NEUROLOGICAL: No headache or numbness, no tremors  MUSCULOSKELETAL: No joint pain, no muscle pain  GENITOURINARY: no dysuria, no frequency, no hesitancy  PSYCHIATRY: no depression , no anxiety  ALL OTHER  ROS negative        Vital Signs Last 24 Hrs  T(C): 36.2 (08 Jan 2020 14:45), Max: 36.8 (07 Jan 2020 21:09)  T(F): 97.2 (08 Jan 2020 14:45), Max: 98.3 (07 Jan 2020 21:09)  HR: 96 (08 Jan 2020 14:45) (64 - 96)  BP: 147/75 (08 Jan 2020 14:45) (126/74 - 147/75)  BP(mean): --  RR: 179 (08 Jan 2020 14:45) (17 - 179)  SpO2: 99% (08 Jan 2020 05:32) (98% - 99%)    ________________________________________________  PHYSICAL EXAM:  GENERAL: NAD  HEENT: Normocephalic;  conjunctivae and sclerae clear; moist mucous membranes;   NECK : supple  CHEST/LUNG: Clear to auscultation bilaterally with good air entry   HEART: S1 S2  regular; no murmurs, gallops or rubs  ABDOMEN: Soft, Nontender, Nondistended; Bowel sounds present  EXTREMITIES: no cyanosis; no edema; no calf tenderness  SKIN: warm and dry; no rash  NERVOUS SYSTEM:  Awake and alert; Oriented  to place, person and time ; no new deficits    _________________________________________________  LABS:                        8.8    6.04  )-----------( 204      ( 07 Jan 2020 06:51 )             28.2     01-07    145  |  112<H>  |  10  ----------------------------<  71  4.2   |  24  |  0.84    Ca    9.0      07 Jan 2020 06:51  Mg     2.4     01-07    TPro  6.2  /  Alb  3.3<L>  /  TBili  1.7<H>  /  DBili  x   /  AST  16  /  ALT  17  /  AlkPhos  34<L>  01-07

## 2020-01-08 NOTE — PROGRESS NOTE ADULT - PROBLEM SELECTOR PLAN 9
IMPROVE VTE Individual Risk Assessment  RISK                                                         Points  [  ] Previous VTE                                      3  [  ] Thrombophilia                                   2  [  ] Lower limb paralysis                         2 (unable to hold up >15 seconds)    [ x ] Current Cancer                                  2       (within 6 months)  [  ] Immobilization > 24 hrs                    1  [  ] ICU/CCU stay > 24 hrs                         1  [ x ] Age > 60                                              1  IMPROVE VTE Score 3  started on lovenox for dvt ppx
IMPROVE VTE Individual Risk Assessment  RISK                                                         Points  [  ] Previous VTE                                      3  [  ] Thrombophilia                                   2  [  ] Lower limb paralysis                         2 (unable to hold up >15 seconds)    [ x ] Current Cancer                                  2       (within 6 months)  [  ] Immobilization > 24 hrs                    1  [  ] ICU/CCU stay > 24 hrs                         1  [ x ] Age > 60                                              1  IMPROVE VTE Score 3  started on lovenox for dvt ppx

## 2020-06-08 ENCOUNTER — INPATIENT (INPATIENT)
Facility: HOSPITAL | Age: 75
LOS: 2 days | Discharge: HOME | DRG: 71 | End: 2020-06-11
Attending: INTERNAL MEDICINE | Admitting: INTERNAL MEDICINE
Payer: COMMERCIAL

## 2020-06-08 VITALS
SYSTOLIC BLOOD PRESSURE: 198 MMHG | TEMPERATURE: 100 F | WEIGHT: 179.9 LBS | HEART RATE: 117 BPM | DIASTOLIC BLOOD PRESSURE: 91 MMHG | HEIGHT: 65 IN | OXYGEN SATURATION: 96 % | RESPIRATION RATE: 18 BRPM

## 2020-06-08 DIAGNOSIS — Z90.710 ACQUIRED ABSENCE OF BOTH CERVIX AND UTERUS: Chronic | ICD-10-CM

## 2020-06-08 LAB
ALBUMIN SERPL ELPH-MCNC: 3.2 G/DL — LOW (ref 3.5–5)
ALP SERPL-CCNC: 39 U/L — LOW (ref 40–120)
ALT FLD-CCNC: 16 U/L DA — SIGNIFICANT CHANGE UP (ref 10–60)
ANION GAP SERPL CALC-SCNC: 8 MMOL/L — SIGNIFICANT CHANGE UP (ref 5–17)
APTT BLD: 36.9 SEC — HIGH (ref 27.5–36.3)
AST SERPL-CCNC: 29 U/L — SIGNIFICANT CHANGE UP (ref 10–40)
BASOPHILS # BLD AUTO: 0 K/UL — SIGNIFICANT CHANGE UP (ref 0–0.2)
BASOPHILS NFR BLD AUTO: 0 % — SIGNIFICANT CHANGE UP (ref 0–2)
BILIRUB SERPL-MCNC: 1.2 MG/DL — SIGNIFICANT CHANGE UP (ref 0.2–1.2)
BUN SERPL-MCNC: 12 MG/DL — SIGNIFICANT CHANGE UP (ref 7–18)
CALCIUM SERPL-MCNC: 9 MG/DL — SIGNIFICANT CHANGE UP (ref 8.4–10.5)
CHLORIDE SERPL-SCNC: 105 MMOL/L — SIGNIFICANT CHANGE UP (ref 96–108)
CO2 SERPL-SCNC: 25 MMOL/L — SIGNIFICANT CHANGE UP (ref 22–31)
CREAT SERPL-MCNC: 0.91 MG/DL — SIGNIFICANT CHANGE UP (ref 0.5–1.3)
EOSINOPHIL # BLD AUTO: 0.37 K/UL — SIGNIFICANT CHANGE UP (ref 0–0.5)
EOSINOPHIL NFR BLD AUTO: 1 % — SIGNIFICANT CHANGE UP (ref 0–6)
GLUCOSE SERPL-MCNC: 126 MG/DL — HIGH (ref 70–99)
HCT VFR BLD CALC: 33.1 % — LOW (ref 34.5–45)
HGB BLD-MCNC: 10.4 G/DL — LOW (ref 11.5–15.5)
INR BLD: 1.82 RATIO — HIGH (ref 0.88–1.16)
LYMPHOCYTES # BLD AUTO: 10 % — LOW (ref 13–44)
LYMPHOCYTES # BLD AUTO: 3.73 K/UL — HIGH (ref 1–3.3)
MCHC RBC-ENTMCNC: 26.3 PG — LOW (ref 27–34)
MCHC RBC-ENTMCNC: 31.4 GM/DL — LOW (ref 32–36)
MCV RBC AUTO: 83.8 FL — SIGNIFICANT CHANGE UP (ref 80–100)
MONOCYTES # BLD AUTO: 4.85 K/UL — HIGH (ref 0–0.9)
MONOCYTES NFR BLD AUTO: 13 % — SIGNIFICANT CHANGE UP (ref 2–14)
NEUTROPHILS # BLD AUTO: 23.86 K/UL — HIGH (ref 1.8–7.4)
NEUTROPHILS NFR BLD AUTO: 64 % — SIGNIFICANT CHANGE UP (ref 43–77)
NT-PROBNP SERPL-SCNC: 1069 PG/ML — HIGH (ref 0–450)
PLATELET # BLD AUTO: 85 K/UL — LOW (ref 150–400)
POTASSIUM SERPL-MCNC: 3.6 MMOL/L — SIGNIFICANT CHANGE UP (ref 3.5–5.3)
POTASSIUM SERPL-SCNC: 3.6 MMOL/L — SIGNIFICANT CHANGE UP (ref 3.5–5.3)
PROT SERPL-MCNC: 8.5 G/DL — HIGH (ref 6–8.3)
PROTHROM AB SERPL-ACNC: 21 SEC — HIGH (ref 10–12.9)
RBC # BLD: 3.95 M/UL — SIGNIFICANT CHANGE UP (ref 3.8–5.2)
RBC # FLD: 22.1 % — HIGH (ref 10.3–14.5)
SODIUM SERPL-SCNC: 138 MMOL/L — SIGNIFICANT CHANGE UP (ref 135–145)
TROPONIN I SERPL-MCNC: <0.015 NG/ML — SIGNIFICANT CHANGE UP (ref 0–0.04)
WBC # BLD: 37.28 K/UL — HIGH (ref 3.8–10.5)
WBC # FLD AUTO: 37.28 K/UL — HIGH (ref 3.8–10.5)

## 2020-06-08 PROCEDURE — 71045 X-RAY EXAM CHEST 1 VIEW: CPT | Mod: 26

## 2020-06-08 RX ORDER — ACETAMINOPHEN 500 MG
650 TABLET ORAL ONCE
Refills: 0 | Status: COMPLETED | OUTPATIENT
Start: 2020-06-08 | End: 2020-06-08

## 2020-06-08 RX ORDER — AZITHROMYCIN 500 MG/1
500 TABLET, FILM COATED ORAL ONCE
Refills: 0 | Status: COMPLETED | OUTPATIENT
Start: 2020-06-08 | End: 2020-06-08

## 2020-06-08 RX ORDER — CEFTRIAXONE 500 MG/1
1000 INJECTION, POWDER, FOR SOLUTION INTRAMUSCULAR; INTRAVENOUS ONCE
Refills: 0 | Status: COMPLETED | OUTPATIENT
Start: 2020-06-08 | End: 2020-06-08

## 2020-06-08 NOTE — ED ADULT NURSE NOTE - OBJECTIVE STATEMENT
Pt c/o SOB starting today. No change with exertion. Hx of leukemia. States some heart hx but not sure on the particulars of dx. States some procedures done in the past.

## 2020-06-08 NOTE — ED ADULT TRIAGE NOTE - CHIEF COMPLAINT QUOTE
pt compliant of feeling short of breathe that started 2am this morning pt compliant of feeling short of breathe that started 2am this morning. pt has a history of hypertension.

## 2020-06-08 NOTE — ED PROVIDER NOTE - CLINICAL SUMMARY MEDICAL DECISION MAKING FREE TEXT BOX
76 y/o woman, h/o leukemia, MDS, anemia, HTN, p/w 1-2 days of shortness of breath, and her son Kael says that today she was mildly confused compared to baseline--Labs, EKG, CXR, reassess.

## 2020-06-08 NOTE — ED PROVIDER NOTE - PROGRESS NOTE DETAILS
Additional history from Pt's son, Kael, over the phone.  Will admit for suspected PNA based upon CXR and clinical picture, however CTA chest ordered as well to r/o pulmonary embolism. Dr. Carpenter accepts Pt for admission.

## 2020-06-08 NOTE — ED ADULT NURSE NOTE - CHIEF COMPLAINT QUOTE
pt compliant of feeling short of breathe that started 2am this morning. pt has a history of hypertension.

## 2020-06-08 NOTE — ED PROVIDER NOTE - OBJECTIVE STATEMENT
76 y/o woman, h/o leukemia, MDS, anemia, HTN, p/w 1-2 days of shortness of breath, and her son Kael says that today she was mildly confused compared to baseline.  No fever/cough/CP.  COVID-19 not suspected, no close contacts or recent test.  Most recent known labs from 5/28/20 showed WBC 21.7 as per sonKael.  Oncologist:  Dr. Nae France  Cardiologist: Dr. Crump (Holter monitoring was recently done for tachycardia, Thursday, but no results given yet)

## 2020-06-09 DIAGNOSIS — R09.89 OTHER SPECIFIED SYMPTOMS AND SIGNS INVOLVING THE CIRCULATORY AND RESPIRATORY SYSTEMS: ICD-10-CM

## 2020-06-09 DIAGNOSIS — G93.40 ENCEPHALOPATHY, UNSPECIFIED: ICD-10-CM

## 2020-06-09 DIAGNOSIS — Z29.9 ENCOUNTER FOR PROPHYLACTIC MEASURES, UNSPECIFIED: ICD-10-CM

## 2020-06-09 DIAGNOSIS — I10 ESSENTIAL (PRIMARY) HYPERTENSION: ICD-10-CM

## 2020-06-09 DIAGNOSIS — J18.9 PNEUMONIA, UNSPECIFIED ORGANISM: ICD-10-CM

## 2020-06-09 DIAGNOSIS — D64.9 ANEMIA, UNSPECIFIED: ICD-10-CM

## 2020-06-09 DIAGNOSIS — R00.0 TACHYCARDIA, UNSPECIFIED: ICD-10-CM

## 2020-06-09 DIAGNOSIS — C95.90 LEUKEMIA, UNSPECIFIED NOT HAVING ACHIEVED REMISSION: ICD-10-CM

## 2020-06-09 LAB
24R-OH-CALCIDIOL SERPL-MCNC: 33.7 NG/ML — SIGNIFICANT CHANGE UP (ref 30–80)
A1C WITH ESTIMATED AVERAGE GLUCOSE RESULT: 4.5 % — SIGNIFICANT CHANGE UP (ref 4–5.6)
ALBUMIN SERPL ELPH-MCNC: 2.9 G/DL — LOW (ref 3.5–5)
ALP SERPL-CCNC: 37 U/L — LOW (ref 40–120)
ALT FLD-CCNC: 16 U/L DA — SIGNIFICANT CHANGE UP (ref 10–60)
ANION GAP SERPL CALC-SCNC: 9 MMOL/L — SIGNIFICANT CHANGE UP (ref 5–17)
APPEARANCE UR: CLEAR — SIGNIFICANT CHANGE UP
AST SERPL-CCNC: 29 U/L — SIGNIFICANT CHANGE UP (ref 10–40)
BACTERIA # UR AUTO: ABNORMAL /HPF
BASOPHILS # BLD AUTO: SIGNIFICANT CHANGE UP K/UL (ref 0–0.2)
BASOPHILS NFR BLD AUTO: SIGNIFICANT CHANGE UP % (ref 0–2)
BILIRUB SERPL-MCNC: 0.9 MG/DL — SIGNIFICANT CHANGE UP (ref 0.2–1.2)
BILIRUB UR-MCNC: NEGATIVE — SIGNIFICANT CHANGE UP
BUN SERPL-MCNC: 13 MG/DL — SIGNIFICANT CHANGE UP (ref 7–18)
CALCIUM SERPL-MCNC: 8.9 MG/DL — SIGNIFICANT CHANGE UP (ref 8.4–10.5)
CHLORIDE SERPL-SCNC: 103 MMOL/L — SIGNIFICANT CHANGE UP (ref 96–108)
CHOLEST SERPL-MCNC: 133 MG/DL — SIGNIFICANT CHANGE UP (ref 10–199)
CO2 SERPL-SCNC: 26 MMOL/L — SIGNIFICANT CHANGE UP (ref 22–31)
COLOR SPEC: YELLOW — SIGNIFICANT CHANGE UP
CORTIS AM PEAK SERPL-MCNC: 10.4 UG/DL — SIGNIFICANT CHANGE UP (ref 6–18.4)
CREAT SERPL-MCNC: 0.81 MG/DL — SIGNIFICANT CHANGE UP (ref 0.5–1.3)
DIFF PNL FLD: ABNORMAL
EOSINOPHIL # BLD AUTO: SIGNIFICANT CHANGE UP K/UL (ref 0–0.5)
EOSINOPHIL NFR BLD AUTO: SIGNIFICANT CHANGE UP % (ref 0–6)
EPI CELLS # UR: SIGNIFICANT CHANGE UP /HPF
ESTIMATED AVERAGE GLUCOSE: 82 MG/DL — SIGNIFICANT CHANGE UP (ref 68–114)
FERRITIN SERPL-MCNC: 3708 NG/ML — HIGH (ref 15–150)
FOLATE SERPL-MCNC: >20 NG/ML — SIGNIFICANT CHANGE UP
GLUCOSE BLDC GLUCOMTR-MCNC: 130 MG/DL — HIGH (ref 70–99)
GLUCOSE BLDC GLUCOMTR-MCNC: 159 MG/DL — HIGH (ref 70–99)
GLUCOSE BLDC GLUCOMTR-MCNC: 178 MG/DL — HIGH (ref 70–99)
GLUCOSE SERPL-MCNC: 108 MG/DL — HIGH (ref 70–99)
GLUCOSE UR QL: NEGATIVE — SIGNIFICANT CHANGE UP
HCT VFR BLD CALC: 33.2 % — LOW (ref 34.5–45)
HDLC SERPL-MCNC: 48 MG/DL — LOW
HGB BLD-MCNC: 10.2 G/DL — LOW (ref 11.5–15.5)
IMM GRANULOCYTES NFR BLD AUTO: SIGNIFICANT CHANGE UP % (ref 0–1.5)
IRON SATN MFR SERPL: 16 % — SIGNIFICANT CHANGE UP (ref 15–50)
IRON SATN MFR SERPL: 27 UG/DL — LOW (ref 40–150)
KETONES UR-MCNC: ABNORMAL
LEUKOCYTE ESTERASE UR-ACNC: ABNORMAL
LIPID PNL WITH DIRECT LDL SERPL: 59 MG/DL — SIGNIFICANT CHANGE UP
LYMPHOCYTES # BLD AUTO: SIGNIFICANT CHANGE UP % (ref 13–44)
LYMPHOCYTES # BLD AUTO: SIGNIFICANT CHANGE UP K/UL (ref 1–3.3)
MAGNESIUM SERPL-MCNC: 2.4 MG/DL — SIGNIFICANT CHANGE UP (ref 1.6–2.6)
MCHC RBC-ENTMCNC: 26.2 PG — LOW (ref 27–34)
MCHC RBC-ENTMCNC: 30.7 GM/DL — LOW (ref 32–36)
MCV RBC AUTO: 85.1 FL — SIGNIFICANT CHANGE UP (ref 80–100)
MONOCYTES # BLD AUTO: SIGNIFICANT CHANGE UP K/UL (ref 0–0.9)
MONOCYTES NFR BLD AUTO: SIGNIFICANT CHANGE UP % (ref 2–14)
NEUTROPHILS # BLD AUTO: SIGNIFICANT CHANGE UP K/UL (ref 1.8–7.4)
NEUTROPHILS NFR BLD AUTO: SIGNIFICANT CHANGE UP % (ref 43–77)
NITRITE UR-MCNC: NEGATIVE — SIGNIFICANT CHANGE UP
NRBC # BLD: SIGNIFICANT CHANGE UP /100 WBCS (ref 0–0)
PH UR: 6 — SIGNIFICANT CHANGE UP (ref 5–8)
PHOSPHATE SERPL-MCNC: 4.5 MG/DL — SIGNIFICANT CHANGE UP (ref 2.5–4.5)
PLATELET # BLD AUTO: 87 K/UL — LOW (ref 150–400)
POTASSIUM SERPL-MCNC: 3.5 MMOL/L — SIGNIFICANT CHANGE UP (ref 3.5–5.3)
POTASSIUM SERPL-SCNC: 3.5 MMOL/L — SIGNIFICANT CHANGE UP (ref 3.5–5.3)
PROT SERPL-MCNC: 7.9 G/DL — SIGNIFICANT CHANGE UP (ref 6–8.3)
PROT UR-MCNC: 100
RBC # BLD: 3.9 M/UL — SIGNIFICANT CHANGE UP (ref 3.8–5.2)
RBC # FLD: 22.2 % — HIGH (ref 10.3–14.5)
RBC CASTS # UR COMP ASSIST: ABNORMAL /HPF (ref 0–2)
SARS-COV-2 RNA SPEC QL NAA+PROBE: SIGNIFICANT CHANGE UP
SODIUM SERPL-SCNC: 138 MMOL/L — SIGNIFICANT CHANGE UP (ref 135–145)
SP GR SPEC: 1.02 — SIGNIFICANT CHANGE UP (ref 1.01–1.02)
TIBC SERPL-MCNC: 173 UG/DL — LOW (ref 250–450)
TOTAL CHOLESTEROL/HDL RATIO MEASUREMENT: 2.8 RATIO — LOW (ref 3.3–7.1)
TRANSFERRIN SERPL-MCNC: 148 MG/DL — LOW (ref 200–360)
TRIGL SERPL-MCNC: 129 MG/DL — SIGNIFICANT CHANGE UP (ref 10–149)
TROPONIN I SERPL-MCNC: <0.015 NG/ML — SIGNIFICANT CHANGE UP (ref 0–0.04)
TSH SERPL-MCNC: 1.24 UU/ML — SIGNIFICANT CHANGE UP (ref 0.34–4.82)
UIBC SERPL-MCNC: 146 UG/DL — SIGNIFICANT CHANGE UP (ref 110–370)
UROBILINOGEN FLD QL: 8
VIT B12 SERPL-MCNC: 1725 PG/ML — HIGH (ref 232–1245)
WBC # BLD: 26.14 K/UL — HIGH (ref 3.8–10.5)
WBC # FLD AUTO: 26.14 K/UL — HIGH (ref 3.8–10.5)
WBC UR QL: ABNORMAL /HPF (ref 0–5)

## 2020-06-09 PROCEDURE — 70450 CT HEAD/BRAIN W/O DYE: CPT | Mod: 26

## 2020-06-09 PROCEDURE — 71275 CT ANGIOGRAPHY CHEST: CPT | Mod: 26

## 2020-06-09 PROCEDURE — 99223 1ST HOSP IP/OBS HIGH 75: CPT

## 2020-06-09 PROCEDURE — 99285 EMERGENCY DEPT VISIT HI MDM: CPT

## 2020-06-09 RX ORDER — DEXTROSE 50 % IN WATER 50 %
25 SYRINGE (ML) INTRAVENOUS ONCE
Refills: 0 | Status: DISCONTINUED | OUTPATIENT
Start: 2020-06-09 | End: 2020-06-11

## 2020-06-09 RX ORDER — FOLIC ACID 0.8 MG
1 TABLET ORAL DAILY
Refills: 0 | Status: DISCONTINUED | OUTPATIENT
Start: 2020-06-09 | End: 2020-06-11

## 2020-06-09 RX ORDER — DEXTROSE 50 % IN WATER 50 %
12.5 SYRINGE (ML) INTRAVENOUS ONCE
Refills: 0 | Status: DISCONTINUED | OUTPATIENT
Start: 2020-06-09 | End: 2020-06-11

## 2020-06-09 RX ORDER — CEFTRIAXONE 500 MG/1
1000 INJECTION, POWDER, FOR SOLUTION INTRAMUSCULAR; INTRAVENOUS EVERY 24 HOURS
Refills: 0 | Status: DISCONTINUED | OUTPATIENT
Start: 2020-06-09 | End: 2020-06-11

## 2020-06-09 RX ORDER — ACETAMINOPHEN 500 MG
650 TABLET ORAL EVERY 6 HOURS
Refills: 0 | Status: DISCONTINUED | OUTPATIENT
Start: 2020-06-09 | End: 2020-06-11

## 2020-06-09 RX ORDER — AZITHROMYCIN 500 MG/1
500 TABLET, FILM COATED ORAL EVERY 24 HOURS
Refills: 0 | Status: DISCONTINUED | OUTPATIENT
Start: 2020-06-09 | End: 2020-06-10

## 2020-06-09 RX ORDER — PROCHLORPERAZINE MALEATE 5 MG
1 TABLET ORAL
Qty: 0 | Refills: 0 | DISCHARGE

## 2020-06-09 RX ORDER — ALENDRONATE SODIUM 70 MG/1
1 TABLET ORAL
Qty: 0 | Refills: 0 | DISCHARGE

## 2020-06-09 RX ORDER — AMLODIPINE BESYLATE 2.5 MG/1
10 TABLET ORAL DAILY
Refills: 0 | Status: DISCONTINUED | OUTPATIENT
Start: 2020-06-09 | End: 2020-06-11

## 2020-06-09 RX ORDER — SODIUM CHLORIDE 9 MG/ML
1000 INJECTION, SOLUTION INTRAVENOUS
Refills: 0 | Status: DISCONTINUED | OUTPATIENT
Start: 2020-06-09 | End: 2020-06-11

## 2020-06-09 RX ORDER — PANTOPRAZOLE SODIUM 20 MG/1
40 TABLET, DELAYED RELEASE ORAL
Refills: 0 | Status: DISCONTINUED | OUTPATIENT
Start: 2020-06-09 | End: 2020-06-11

## 2020-06-09 RX ORDER — FUROSEMIDE 40 MG
40 TABLET ORAL DAILY
Refills: 0 | Status: DISCONTINUED | OUTPATIENT
Start: 2020-06-09 | End: 2020-06-11

## 2020-06-09 RX ORDER — DEXTROSE 50 % IN WATER 50 %
15 SYRINGE (ML) INTRAVENOUS ONCE
Refills: 0 | Status: DISCONTINUED | OUTPATIENT
Start: 2020-06-09 | End: 2020-06-11

## 2020-06-09 RX ORDER — METOPROLOL TARTRATE 50 MG
1 TABLET ORAL
Qty: 0 | Refills: 0 | DISCHARGE

## 2020-06-09 RX ORDER — ENOXAPARIN SODIUM 100 MG/ML
40 INJECTION SUBCUTANEOUS DAILY
Refills: 0 | Status: DISCONTINUED | OUTPATIENT
Start: 2020-06-09 | End: 2020-06-11

## 2020-06-09 RX ORDER — GLUCAGON INJECTION, SOLUTION 0.5 MG/.1ML
1 INJECTION, SOLUTION SUBCUTANEOUS ONCE
Refills: 0 | Status: DISCONTINUED | OUTPATIENT
Start: 2020-06-09 | End: 2020-06-11

## 2020-06-09 RX ORDER — INSULIN LISPRO 100/ML
VIAL (ML) SUBCUTANEOUS
Refills: 0 | Status: DISCONTINUED | OUTPATIENT
Start: 2020-06-09 | End: 2020-06-11

## 2020-06-09 RX ORDER — GABAPENTIN 400 MG/1
1 CAPSULE ORAL
Qty: 0 | Refills: 0 | DISCHARGE

## 2020-06-09 RX ORDER — FERROUS SULFATE 325(65) MG
325 TABLET ORAL DAILY
Refills: 0 | Status: DISCONTINUED | OUTPATIENT
Start: 2020-06-09 | End: 2020-06-11

## 2020-06-09 RX ORDER — METOPROLOL TARTRATE 50 MG
50 TABLET ORAL DAILY
Refills: 0 | Status: DISCONTINUED | OUTPATIENT
Start: 2020-06-09 | End: 2020-06-11

## 2020-06-09 RX ORDER — GABAPENTIN 400 MG/1
300 CAPSULE ORAL
Refills: 0 | Status: DISCONTINUED | OUTPATIENT
Start: 2020-06-09 | End: 2020-06-11

## 2020-06-09 RX ADMIN — AMLODIPINE BESYLATE 10 MILLIGRAM(S): 2.5 TABLET ORAL at 06:52

## 2020-06-09 RX ADMIN — GABAPENTIN 300 MILLIGRAM(S): 400 CAPSULE ORAL at 17:12

## 2020-06-09 RX ADMIN — Medication 80 MILLIGRAM(S): at 06:52

## 2020-06-09 RX ADMIN — CEFTRIAXONE 100 MILLIGRAM(S): 500 INJECTION, POWDER, FOR SOLUTION INTRAMUSCULAR; INTRAVENOUS at 01:13

## 2020-06-09 RX ADMIN — AZITHROMYCIN 255 MILLIGRAM(S): 500 TABLET, FILM COATED ORAL at 02:00

## 2020-06-09 RX ADMIN — Medication 1: at 17:42

## 2020-06-09 RX ADMIN — PANTOPRAZOLE SODIUM 40 MILLIGRAM(S): 20 TABLET, DELAYED RELEASE ORAL at 08:03

## 2020-06-09 RX ADMIN — Medication 40 MILLIGRAM(S): at 06:52

## 2020-06-09 RX ADMIN — Medication 650 MILLIGRAM(S): at 01:13

## 2020-06-09 RX ADMIN — ENOXAPARIN SODIUM 40 MILLIGRAM(S): 100 INJECTION SUBCUTANEOUS at 11:20

## 2020-06-09 RX ADMIN — Medication 325 MILLIGRAM(S): at 11:42

## 2020-06-09 RX ADMIN — Medication 1 MILLIGRAM(S): at 11:20

## 2020-06-09 NOTE — H&P ADULT - PROBLEM SELECTOR PLAN 3
- h/o leukemia  - Oncology consult, OP oncologist; Dr France   - on chronic steroids, 80 mg Prednisone OD, will test for AM cortisol

## 2020-06-09 NOTE — CONSULT NOTE ADULT - ASSESSMENT
Assessment and Plan:    Assessment:  · Assessment		  76 yo female with PMH of leukemia (on chronic steroids), HTN, sinus tachycardia, presented with SOB for 1 day and AMS    admitted for acute encephalopathy and SOB           Problem/Plan - 1:  ·  Problem: PNA (pneumonia).  Plan: - p/w SOB, fever: 100.3, WBC: 37K   - CXR: ?PNa   ED course; Rocephin and Azithro   - follow blood culture, Follow UA.      Problem/Plan - 2:  ·  Problem: Encephalopathy acute. Plan: - pt had slightly altered mental status as per son . on my exam, she is AAO x3   - CT head ordered.   - AMS, could be related to PNA.      Problem/Plan - 3:  ·  Problem: Leukemia. Plan: - h/o leukemia  - Oncology consult, OP oncologist; Dr France   - on chronic steroids, 80 mg Prednisone OD, will test for AM cortisol.      Problem/Plan - 4:  ·  Problem: Anemia. Plan: Hb: 10.4  anemia panel   - ferrous sulphate.      Problem/Plan - 5:  ·  Problem: Essential hypertension. Plan: - home med: Amlodipine, Lasix, metoprolol ; continued   - monitor BP.      Problem/Plan - 6:  Problem: Sinus tachycardia. Plan: - home med; metoprolol succ 50 mg Od continued   - Cardiologist: Dr. Crump (Holter monitoring was recently done for tachycardia, Thursday, but no results given yet).     Problem/Plan - 7:  ·  Problem: Prophylactic measure.  Plan: IMPROVE VTE Individual Risk Assessment  RISK                                                                Points  [  ] Previous VTE                                                  3  [  ] Thrombophilia                                               2  [  ] Lower limb paralysis                                      2        (unable to hold up >15 seconds)    [  ] Current Cancer                                              2         (within 6 months)  [x  ] Immobilization > 24 hrs                                1  [  ] ICU/CCU stay > 24 hours                              1  [x  ] Age > 60                                                      1  IMPROVE VTE Score _________2, Lovenox for DVT proph.        Attending Statement:  Patient seen and examined ; case was discussed with the admitting resident  Unable to obtain ROS, fam hx due to patient condition.   ROS: as in the HPI; all other ROS negative    SH and family history as above    Vital Signs Last 24 Hrs  T(C): 36.9 (09 Jun 2020 00:36), Max: 37.9 (08 Jun 2020 22:02)  T(F): 98.5 (09 Jun 2020 00:36), Max: 100.3 (08 Jun 2020 22:02)  HR: 83 (09 Jun 2020 00:36) (83 - 117)  BP: 130/72 (09 Jun 2020 00:36) (120/73 - 198/91)  BP(mean): --  RR: 17 (09 Jun 2020 00:36) (17 - 18)  SpO2: 96% (09 Jun 2020 00:36) (96% - 100%)    GEN: NAD  HEENT- normocephalic; mouth moist  CVS- S1S2+  LUNGS- clear to auscultation; no wheezing  ABD: Soft , nontender, nondistended, Bowel sounds are present  EXTREMITY: no calf tenderness, no cyanosis, no edema  NEURO: AAOx3; non focal neurologic exam; cranial nerves grossly intact  PSYCH: normal affect and behavior  BACK: no swelling or mass;   VASCULAR: ++ distal peripheral pulses  SKIN: warm and dry.       Labs Reviewed:                         10.4   37.28 )-----------( 85       ( 08 Jun 2020 21:22 )             33.1     06-08    138  |  105  |  12  ----------------------------<  126<H>  3.6   |  25  |  0.91    Ca    9.0      08 Jun 2020 21:22    TPro  8.5<H>  /  Alb  3.2<L>  /  TBili  1.2  /  DBili  x   /  AST  29  /  ALT  16  /  AlkPhos  39<L>  06-08    CARDIAC MARKERS ( 08 Jun 2020 21:22 )  <0.015 ng/mL / x     / x     / x     / x      PT/INR - ( 08 Jun 2020 21:22 )   PT: 21.0 sec;   INR: 1.82 ratio    PTT - ( 08 Jun 2020 21:22 )  PTT:36.9 sec  BNP: Serum Pro-Brain Natriuretic Peptide: 1069 pg/mL (06-08 @ 21:22)    MEDICATIONS  (STANDING):  azithromycin  IVPB 500 milliGRAM(s) IV Intermittent every 24 hours  cefTRIAXone   IVPB 1000 milliGRAM(s) IV Intermittent every 24 hours  enoxaparin Injectable 40 milliGRAM(s) SubCutaneous daily    MEDICATIONS  (PRN):  acetaminophen   Tablet .. 650 milliGRAM(s) Oral every 6 hours PRN Temp greater or equal to 38C (100.4F), Mild Pain (1 - 3)    CXR reviewed  EKG Reviewed    76 y/o F with MDS/ Leukemia admitted with  shortness of breath. There was concern for altered mentation by her son, on exam she is conversant but has poor memory.     1. Encephalopathy- toxic metabolic due to suspected underlying infection- needs CT head but is delayed due to previous contrast study which limits study. UA/UCx pending, will attempt to reach son to elaborate further.   2. CAP-  empiric rocephin/ azithro   3. Sepsis- with leukomioid/ leukocytosis Tm100.3, tachycardia  unclear if on steroids or chemotx regimen, no clear PNA with some mild ggo in the bases on chest CT, will treat for presumptive pna pending UA/UCx in setting of immunocompromise and symptoms.    4. Thrombocytopenia- no overt bleeding, related to sepsis vs underlying MDS.  5. MDS- appreciate [facility] hematology consult, was on chronic steroids will attempt to get information from son, and continue home dose steroid.   6. Anemia- check iron panel   7. HTN     Addendum- patient's son spoke with covering resident, patient was not acting herself, stating she felt fine but also had shortness of breath. Dyspnea hx is still limited, denies aggravating, alleviating factors, cough, chest pain.   Plan of care discussed with patient ;  all questions and concerns were addressed.  Discussed with Patient's family. Assessment and Plan:    Assessment:  · Assessment		  76 yo female with PMH of leukemia (on chronic steroids), HTN, sinus tachycardia, presented with SOB for 1 day and AMS. 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 Will continue to monitor the patient.  Any questions please call [phone] Assessment and Plan:    Assessment:  · Assessment		  74 yo female with PMH of leukemia (on chronic steroids), HTN, sinus tachycardia, presented with SOB for 1 day and AMS. 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 France    Problem#  Anemia  Hb: 10.2, MCV 83  multifactorial- ?Leukemia/?MDS/PNA/sepsis  anemia panel sent  Iron sat 16%, other work-up pending    # Thrombocytopenia:   Stable. No spontaneous bleeding / bruising.   -CTM.   -CBC daily  -1U SDP Tx for Plts < 10K     Problem# VTE Prophylaxis    on Lovenox 40mg      Thank you for the referral. Will continue to monitor the patient.  Any questions please call [phone]

## 2020-06-09 NOTE — H&P ADULT - PROBLEM SELECTOR PLAN 1
- p/w SOB, fever: 100.3, WBC: 37K   - CXR: ?PNa   ED course; Rocephin and Azithro   - follow blood culture, Follow UA

## 2020-06-09 NOTE — H&P ADULT - PROBLEM SELECTOR PLAN 6
- home med; metoprolol succ 50 mg Od continued   - Cardiologist: Dr. Crump (Holter monitoring was recently done for tachycardia, Thursday, but no results given yet).

## 2020-06-09 NOTE — CONSULT NOTE ADULT - SUBJECTIVE AND OBJECTIVE BOX
History of Present Illness:  Reason for Admission: Shortness of breath	  History of Present Illness: 	  76 yo female with PMH of leukemia (on chronic steroids), HTN, sinus tachycardia, presented with SOB for 1 day. Spoke to son over phone, who informed me that SOB was present all the time and that pt "didnot seem alright" and seemed confused. Denies any fever, cough, denies any sick contact. Pt mostly stays home. Pt reports feeling fine, denies any complains. denies any pain. As per son, recent known labs from 5/28/20 showed WBC 21.7. Her Oncologist:  Dr. Nae France. She has h/o sinus tachycardia. Cardiologist: Dr. Crump (Holter monitoring was recently done for tachycardia, Thursday, but no results given yet).    GOC:      OncHx:       Review of Systems:  Other Review of Systems: All other review of systems negative, except as noted in HPI 	      Allergies and Intolerances:        Allergies:  	No Known Allergies:     Home Medications:   * Patient Currently Takes Medications as of 09-Jun-2020 06:22 documented in Structured Notes  · 	amLODIPine 10 mg oral tablet: 1 tab(s) orally once a day  · 	predniSONE 20 mg oral tablet: 4 tab(s) orally once a day  · 	ferrous sulfate 325 mg (65 mg elemental iron) oral tablet: 1 tab(s) orally once a day  · 	folic acid 1 mg oral tablet: 1 tab(s) orally once a day  · 	furosemide 40 mg oral tablet: 1 tab(s) orally once a day  · 	gabapentin 300 mg oral capsule: 1 cap(s) orally 2 times a day  · 	Metoprolol Succinate ER 50 mg oral tablet, extended release: 1 tab(s) orally once a day  · 	prochlorperazine 10 mg oral tablet: 1 tab(s) orally 3 times a day, As Needed    Patient History:    Past Medical, Past Surgical, and Family History:  PAST MEDICAL HISTORY:  Anemia , Essential hypertension, Fibroids, H/O rheumatic heart disease, Leukemia.     PAST SURGICAL HISTORY:  H/O abdominal hysterectomy.     FAMILY HISTORY:  FH: breast cancer, sister.     Social History:  Social History (marital status, living situation, occupation, tobacco use, alcohol and drug use, and sexual history): from home   self ambulates   lives with son  denies alcohol, denies smoking	     Tobacco Screening:  · Core Measure Site	Yes	  · Has the patient used tobacco in the past 30 days?	No	    Risk Assessment:    Present on Admission:  Deep Venous Thrombosis	no	  Pulmonary Embolus	suspected	     Heart Failure:  Does this patient have a history of or has been diagnosed with heart failure? unknown.    Vitals:    Physical Exam:      PHYSICAL EXAM:  GENERAL: female in bed   HEENT: Normocephalic;  conjunctivae and sclerae clear; moist mucous membranes;   NECK : supple  CHEST/LUNG: Clear to auscultation bilaterally with good air entry   HEART: S1 S2  regular; no murmurs, gallops or rubs  ABDOMEN: Soft, Nontender, Nondistended; Bowel sounds present  EXTREMITIES: no cyanosis; no edema; no calf tenderness  SKIN: warm and dry; no rash NERVOUS SYSTEM:  Awake and alert; Oriented  to place, person and time ; no new deficits	  	      Laboratory:               Radiology:   CT:	    09-Jun-2020 01:06, CT Angio Chest w/ IV Cont	  CT Angio Chest w/ IV Cont: EXAM:  CT ANGIO CHEST (W)AW IC                            PROCEDURE DATE:  06/09/2020          INTERPRETATION:  CLINICAL INFORMATION: Shortness of breath and leukocytosis    COMPARISON: CT chest 4/3/2019    PROCEDURE:   CT Angiography of the Chest.  60 ml of Omnipaque 350 was injected intravenously. 40 ml were discarded.  Sagittal and coronal reformats were performed as well as 3D (MIP) reconstructions.    FINDINGS:    LUNGS AND AIRWAYS: Patent central airways.  Bilateral lower lobe subsegmental atelectasis.  PLEURA: No pleural effusion.  MEDIASTINUM AND EILEEN: No lymphadenopathy.  VESSELS: Suboptimal opacification of the pulmonary arteries limits evaluation for pulmonary embolus. No large saddle embolus. Normal caliber main pulmonary artery. Aorta and great vessels are within normal limits..  HEART: Heart size is normal. Trace pericardial fluid.  CHEST WALL AND LOWER NECK: Within normal limits.  VISUALIZED UPPER ABDOMEN: Stable left adrenal nodule measures 1.8 cm..  BONES: Degenerative changes of the spine.    IMPRESSION:   Suboptimal exam for detection of pulmonary emboli due to poor opacification of the pulmonary arteries. No large saddle embolus.   HAO MARCELINO M.D., ATTENDING RADIOLOGIST  This document has been electronically signed. Jun 9 2020  1:26AM	  X-Ray, Fluoroscopy:	  PACS Image: Image(s) Available History of Present Illness:  Reason for Admission: Shortness of breath	  History of Present Illness: 	  74 yo female with PMH of leukemia (on chronic steroids), HTN, sinus tachycardia, presented with SOB for 1 day. Spoke to son over phone, who informed me that SOB was present all the time and that pt "didnot seem alright" and seemed confused. Denies any fever, cough, denies any sick contact. Pt mostly stays home. Pt reports feeling fine, denies any complains. denies any pain. As per son, recent known labs from 5/28/20 showed WBC 21.7. Her Oncologist:  Dr. Nae France. She has h/o sinus tachycardia. Cardiologist: Dr. Crump (Holter monitoring was recently done for tachycardia, Thursday, but no results given yet).    GOC: Fc     OncHx: Leukemia, pt unsure what type. Denies MDS. She has been receiving monthly injections at her Oncologist office with Dr. France. She also rcv'g treatment for anemia, but unsure of medication name. Pt states she had a bone marrow biopsy of her left shoulder in April 2019 which confirmed leukemia. She is currently on prednisone as well. Denies weight loss, bleeding, chronic infections, fever       Review of Systems:  Other Review of Systems: All other review of systems negative, except as noted in HPI 	      Allergies and Intolerances:        Allergies:  	No Known Allergies:     Home Medications:   * Patient Currently Takes Medications as of 09-Jun-2020 06:22 documented in Structured Notes  · 	amLODIPine 10 mg oral tablet: 1 tab(s) orally once a day  · 	predniSONE 20 mg oral tablet: 4 tab(s) orally once a day  · 	ferrous sulfate 325 mg (65 mg elemental iron) oral tablet: 1 tab(s) orally once a day  · 	folic acid 1 mg oral tablet: 1 tab(s) orally once a day  · 	furosemide 40 mg oral tablet: 1 tab(s) orally once a day  · 	gabapentin 300 mg oral capsule: 1 cap(s) orally 2 times a day  · 	Metoprolol Succinate ER 50 mg oral tablet, extended release: 1 tab(s) orally once a day  · 	prochlorperazine 10 mg oral tablet: 1 tab(s) orally 3 times a day, As Needed    Patient History:    Past Medical, Past Surgical, and Family History:  PAST MEDICAL HISTORY:  Anemia , Essential hypertension, Fibroids, H/O rheumatic heart disease, Leukemia.     PAST SURGICAL HISTORY:  H/O abdominal hysterectomy.     FAMILY HISTORY:  FH: breast cancer, sister.     Social History:  Social History (marital status, living situation, occupation, tobacco use, alcohol and drug use, and sexual history): from home   self ambulates   lives with son  denies alcohol, denies smoking	     Tobacco Screening:  · Core Measure Site	Yes	  · Has the patient used tobacco in the past 30 days?	No	    Risk Assessment:    Present on Admission:  Deep Venous Thrombosis	no	  Pulmonary Embolus	suspected	     Heart Failure:  Does this patient have a history of or has been diagnosed with heart failure? unknown.    Vitals:  Vital Signs Last 24 Hrs  T(C): 37.4 (09 Jun 2020 17:30), Max: 37.9 (08 Jun 2020 22:02)  T(F): 99.3 (09 Jun 2020 17:30), Max: 100.3 (08 Jun 2020 22:02)  HR: 86 (09 Jun 2020 17:30) (69 - 117)  BP: 125/85 (09 Jun 2020 17:30) (120/73 - 198/91)  BP(mean): --  ABP: --  ABP(mean): --  RR: 18 (09 Jun 2020 17:30) (16 - 18)  SpO2: 100% (09 Jun 2020 17:30) (96% - 100%)      Physical Exam:     	GENERAL: NAD, AAOx3  	HEENT: Normocephalic;  conjunctivae and sclerae clear; moist mucous membranes;   	NECK : supple  	CHEST/LUNG: Clear to auscultation bilaterally with good air entry   	HEART: S1 S2  regular; no murmurs, gallops or rubs  	ABDOMEN: Soft, Nontender, Nondistended; BS+, no hepatosplenomegaly  	EXTREMITIES: no cyanosis; no edema; no calf tenderness  	SKIN: warm and dry; no rash  NERVOUS SYSTEM:  Awake and alert; Oriented  to place, person and time ; no new deficit        Laboratory:       CBC Full  -  ( 09 Jun 2020 06:12 )  WBC Count : 26.14 K/uL  RBC Count : 3.90 M/uL  Hemoglobin : 10.2 g/dL  Hematocrit : 33.2 %  Platelet Count - Automated : 87 K/uL  Mean Cell Volume : 85.1 fl  Mean Cell Hemoglobin : 26.2 pg  Mean Cell Hemoglobin Concentration : 30.7 gm/dL  Auto Neutrophil # : SEE NOTE K/uL  Auto Lymphocyte # : SEE NOTE K/uL  Auto Monocyte # : SEE NOTE K/uL  Auto Eosinophil # : SEE NOTE K/uL  Auto Basophil # : SEE NOTE K/uL  Auto Neutrophil % : SEE NOTE %  Auto Lymphocyte % : SEE NOTE %  Auto Monocyte % : SEE NOTE %  Auto Eosinophil % : SEE NOTE %  Auto Basophil % : SEE NOTE %    06-09    138  |  103  |  13  ----------------------------<  108<H>  3.5   |  26  |  0.81    Ca    8.9      09 Jun 2020 06:12  Phos  4.5     06-09  Mg     2.4     06-09    TPro  7.9  /  Alb  2.9<L>  /  TBili  0.9  /  DBili  x   /  AST  29  /  ALT  16  /  AlkPhos  37<L>  06-09          Radiology:     < from: CT Head No Cont (06.09.20 @ 08:47) >  XAM:  CT BRAIN                            PROCEDURE DATE:  06/09/2020          INTERPRETATION:      CT head without IV contrast        CLINICAL INFORMATION:  Altered mental status    TECHNIQUE: Contiguous axial 5 mm sections were obtained through the head. Sagittal and coronal 2-D reformatted images were also obtained.   This scan was performed using automatic exposure control (radiation dose reduction software) to obtain a diagnostic image quality scan with patient dose as low as reasonably achievable.     FINDINGS:   No previous examinations are available for review.    The brain demonstrates focal encephalomalacia and gliosis in the posterior RIGHT LEFT frontal lobe at the convexity, likely sequela of old infarction.   No acute cerebralcortical infarct is seen.  No intracranial hemorrhage is found.  No mass effect is found in the brain.      The ventricles, sulci and basal cisterns appear unremarkable.    The orbits are unremarkable.  The paranasal sinuses are clear.  The nasal cavity appears intact.  The nasopharynx is symmetric.  The central skull base, petrous temporal bones and calvarium remain intact.      IMPRESSION:   focal encephalomalacia and gliosis in the posterior RIGHT LEFT frontal lobe at the convexity, likely sequela of old infarction.                < end of copied text >    CT:	    09-Jun-2020 01:06, CT Angio Chest w/ IV Cont	  CT Angio Chest w/ IV Cont: EXAM:  CT ANGIO CHEST (W)AW IC                            PROCEDURE DATE:  06/09/2020          INTERPRETATION:  CLINICAL INFORMATION: Shortness of breath and leukocytosis    COMPARISON: CT chest 4/3/2019    PROCEDURE:   CT Angiography of the Chest.  60 ml of Omnipaque 350 was injected intravenously. 40 ml were discarded.  Sagittal and coronal reformats were performed as well as 3D (MIP) reconstructions.    FINDINGS:    LUNGS AND AIRWAYS: Patent central airways.  Bilateral lower lobe subsegmental atelectasis.  PLEURA: No pleural effusion.  MEDIASTINUM AND EILEEN: No lymphadenopathy.  VESSELS: Suboptimal opacification of the pulmonary arteries limits evaluation for pulmonary embolus. No large saddle embolus. Normal caliber main pulmonary artery. Aorta and great vessels are within normal limits..  HEART: Heart size is normal. Trace pericardial fluid.  CHEST WALL AND LOWER NECK: Within normal limits.  VISUALIZED UPPER ABDOMEN: Stable left adrenal nodule measures 1.8 cm..  BONES: Degenerative changes of the spine.    IMPRESSION:   Suboptimal exam for detection of pulmonary emboli due to poor opacification of the pulmonary arteries. No large saddle embolus.   HAO MARCELINO M.D., ATTENDING RADIOLOGIST  This document has been electronically signed. Jun 9 2020  1:26AM	  X-Ray, Fluoroscopy:	  PACS Image: Image(s) Available< from: Xray Chest 1 View AP/PA (06.08.20 @ 21:10) > EXAM:  XR CHEST AP OR PA 1V                         PROCEDURE DATE:  06/08/2020      INTERPRETATION:  AP chest on June 8, 2020 at 8:57 PM. Patient is short of breath.  Heart is likely enlarged.  Small bandlike atelectases are increased from January 4.  IMPRESSION: Increasing band like atelectases. Heart enlargement again noted.  CLAUDIA WHITE M.D., ATTENDING RADIOLOGIST This document has been electronically signed. Jun 9 2020  7:36AM     < end of copied text >

## 2020-06-09 NOTE — H&P ADULT - ATTENDING COMMENTS
Patient seen and examined ; case was discussed with the admitting resident    ROS: as in the HPI; all other ROS negative    SH and family history as above    Vital Signs Last 24 Hrs  T(C): 36.9 (09 Jun 2020 00:36), Max: 37.9 (08 Jun 2020 22:02)  T(F): 98.5 (09 Jun 2020 00:36), Max: 100.3 (08 Jun 2020 22:02)  HR: 83 (09 Jun 2020 00:36) (83 - 117)  BP: 130/72 (09 Jun 2020 00:36) (120/73 - 198/91)  BP(mean): --  RR: 17 (09 Jun 2020 00:36) (17 - 18)  SpO2: 96% (09 Jun 2020 00:36) (96% - 100%)    GEN: NAD  HEENT- normocephalic; mouth moist  CVS- S1S2+  LUNGS- clear to auscultation; no wheezing  ABD: Soft , nontender, nondistended, Bowel sounds are present  EXTREMITY: no calf tenderness, no cyanosis, no edema  NEURO: AAOx3; non focal neurologic exam; cranial nerves grossly intact  PSYCH: normal affect and behavior  BACK: no swelling or mass;   VASCULAR: ++ distal peripheral pulses  SKIN: warm and dry.       Labs Reviewed:                         10.4   37.28 )-----------( 85       ( 08 Jun 2020 21:22 )             33.1     06-08    138  |  105  |  12  ----------------------------<  126<H>  3.6   |  25  |  0.91    Ca    9.0      08 Jun 2020 21:22    TPro  8.5<H>  /  Alb  3.2<L>  /  TBili  1.2  /  DBili  x   /  AST  29  /  ALT  16  /  AlkPhos  39<L>  06-08    CARDIAC MARKERS ( 08 Jun 2020 21:22 )  <0.015 ng/mL / x     / x     / x     / x      PT/INR - ( 08 Jun 2020 21:22 )   PT: 21.0 sec;   INR: 1.82 ratio    PTT - ( 08 Jun 2020 21:22 )  PTT:36.9 sec  BNP: Serum Pro-Brain Natriuretic Peptide: 1069 pg/mL (06-08 @ 21:22)    MEDICATIONS  (STANDING):  azithromycin  IVPB 500 milliGRAM(s) IV Intermittent every 24 hours  cefTRIAXone   IVPB 1000 milliGRAM(s) IV Intermittent every 24 hours  enoxaparin Injectable 40 milliGRAM(s) SubCutaneous daily    MEDICATIONS  (PRN):  acetaminophen   Tablet .. 650 milliGRAM(s) Oral every 6 hours PRN Temp greater or equal to 38C (100.4F), Mild Pain (1 - 3)    CXR reviewed  EKG Reviewed    76 y/o F with MDS/ Leukemia admitted with  shortness of breath. There was concern for altered mentation by her son, on exam she is conversant but has poor memory.     1. Encephalopathy- toxic metabolic due to suspected underlying infection- needs CT head but is delayed due to previous contrast study per policy. UA/UCx pending, will attempt to reach son to elaborate further.   2. CAP-  empiric rocephin/ azithro   3. Sepsis- with leukomioid/ leukocytosis Tm100.3, tachycardia  unclear if on steroids or chemotx regimen, no clear PNA with some mild ggo in the bases on chest CT, will treat for presumptive pna pending UA/UCx in setting of immunocompromise and symptoms.    4. Thrombocytopenia- no overt bleeding, related to sepsis vs underlying MDS.  5. MDS- appreciate [facility] hematology consult, was on chronic steroids will attempt to get information from son, and continue home dose steroid.   6. Anemia- check iron panel   7. HTN     Plan of care discussed with patient ;  all questions and concerns were addressed.  Discussed with Patient's family Patient seen and examined ; case was discussed with the admitting resident  Unable to obtain ROS, fam hx due to patient condition.   ROS: as in the HPI; all other ROS negative    SH and family history as above    Vital Signs Last 24 Hrs  T(C): 36.9 (09 Jun 2020 00:36), Max: 37.9 (08 Jun 2020 22:02)  T(F): 98.5 (09 Jun 2020 00:36), Max: 100.3 (08 Jun 2020 22:02)  HR: 83 (09 Jun 2020 00:36) (83 - 117)  BP: 130/72 (09 Jun 2020 00:36) (120/73 - 198/91)  BP(mean): --  RR: 17 (09 Jun 2020 00:36) (17 - 18)  SpO2: 96% (09 Jun 2020 00:36) (96% - 100%)    GEN: NAD  HEENT- normocephalic; mouth moist  CVS- S1S2+  LUNGS- clear to auscultation; no wheezing  ABD: Soft , nontender, nondistended, Bowel sounds are present  EXTREMITY: no calf tenderness, no cyanosis, no edema  NEURO: AAOx3; non focal neurologic exam; cranial nerves grossly intact  PSYCH: normal affect and behavior  BACK: no swelling or mass;   VASCULAR: ++ distal peripheral pulses  SKIN: warm and dry.       Labs Reviewed:                         10.4   37.28 )-----------( 85       ( 08 Jun 2020 21:22 )             33.1     06-08    138  |  105  |  12  ----------------------------<  126<H>  3.6   |  25  |  0.91    Ca    9.0      08 Jun 2020 21:22    TPro  8.5<H>  /  Alb  3.2<L>  /  TBili  1.2  /  DBili  x   /  AST  29  /  ALT  16  /  AlkPhos  39<L>  06-08    CARDIAC MARKERS ( 08 Jun 2020 21:22 )  <0.015 ng/mL / x     / x     / x     / x      PT/INR - ( 08 Jun 2020 21:22 )   PT: 21.0 sec;   INR: 1.82 ratio    PTT - ( 08 Jun 2020 21:22 )  PTT:36.9 sec  BNP: Serum Pro-Brain Natriuretic Peptide: 1069 pg/mL (06-08 @ 21:22)    MEDICATIONS  (STANDING):  azithromycin  IVPB 500 milliGRAM(s) IV Intermittent every 24 hours  cefTRIAXone   IVPB 1000 milliGRAM(s) IV Intermittent every 24 hours  enoxaparin Injectable 40 milliGRAM(s) SubCutaneous daily    MEDICATIONS  (PRN):  acetaminophen   Tablet .. 650 milliGRAM(s) Oral every 6 hours PRN Temp greater or equal to 38C (100.4F), Mild Pain (1 - 3)    CXR reviewed  EKG Reviewed    76 y/o F with MDS/ Leukemia admitted with  shortness of breath. There was concern for altered mentation by her son, on exam she is conversant but has poor memory.     1. Encephalopathy- toxic metabolic due to suspected underlying infection- needs CT head but is delayed due to previous contrast study which limits study. UA/UCx pending, will attempt to reach son to elaborate further.   2. CAP-  empiric rocephin/ azithro   3. Sepsis- with leukomioid/ leukocytosis Tm100.3, tachycardia  unclear if on steroids or chemotx regimen, no clear PNA with some mild ggo in the bases on chest CT, will treat for presumptive pna pending UA/UCx in setting of immunocompromise and symptoms.    4. Thrombocytopenia- no overt bleeding, related to sepsis vs underlying MDS.  5. MDS- appreciate [facility] hematology consult, was on chronic steroids will attempt to get information from son, and continue home dose steroid.   6. Anemia- check iron panel   7. HTN     Addendum- patient's son spoke with covering resident, patient was not acting herself, stating she felt fine but also had shortness of breath. Dyspnea hx is still limited, denies aggravating, alleviating factors, cough, chest pain.   Plan of care discussed with patient ;  all questions and concerns were addressed.  Discussed with Patient's family

## 2020-06-09 NOTE — H&P ADULT - ASSESSMENT
76 yo female with PMH of leukemia (on chronic steroids), HTN, sinus tachycardia, presented with SOB for 1 day and AMS    admitted for acute encephalopathy and SOB

## 2020-06-09 NOTE — H&P ADULT - NSHPPHYSICALEXAM_GEN_ALL_CORE
ICU Vital Signs Last 24 Hrs  T(C): 36.9 (09 Jun 2020 00:36), Max: 37.9 (08 Jun 2020 22:02)  T(F): 98.5 (09 Jun 2020 00:36), Max: 100.3 (08 Jun 2020 22:02)  HR: 83 (09 Jun 2020 00:36) (83 - 117)  BP: 130/72 (09 Jun 2020 00:36) (120/73 - 198/91)  BP(mean): --  ABP: --  ABP(mean): --  RR: 17 (09 Jun 2020 00:36) (17 - 18)  SpO2: 96% (09 Jun 2020 00:36) (96% - 100%) Vital Signs Last 24 Hrs  T(C): 36.9 (09 Jun 2020 00:36), Max: 37.9 (08 Jun 2020 22:02)  T(F): 98.5 (09 Jun 2020 00:36), Max: 100.3 (08 Jun 2020 22:02)  HR: 83 (09 Jun 2020 00:36) (83 - 117)  BP: 130/72 (09 Jun 2020 00:36) (120/73 - 198/91)  RR: 17 (09 Jun 2020 00:36) (17 - 18)  SpO2: 96% (09 Jun 2020 00:36) (96% - 100%)    PHYSICAL EXAM:  GENERAL: female in bed   HEENT: Normocephalic;  conjunctivae and sclerae clear; moist mucous membranes;   NECK : supple  CHEST/LUNG: Clear to auscultation bilaterally with good air entry   HEART: S1 S2  regular; no murmurs, gallops or rubs  ABDOMEN: Soft, Nontender, Nondistended; Bowel sounds present  EXTREMITIES: no cyanosis; no edema; no calf tenderness  SKIN: warm and dry; no rash  NERVOUS SYSTEM:  Awake and alert; Oriented  to place, person and time ; no new deficits

## 2020-06-09 NOTE — H&P ADULT - PROBLEM SELECTOR PLAN 7
IMPROVE VTE Individual Risk Assessment  RISK                                                                Points  [  ] Previous VTE                                                  3  [  ] Thrombophilia                                               2  [  ] Lower limb paralysis                                      2        (unable to hold up >15 seconds)    [  ] Current Cancer                                              2         (within 6 months)  [x  ] Immobilization > 24 hrs                                1  [  ] ICU/CCU stay > 24 hours                              1  [x  ] Age > 60                                                      1  IMPROVE VTE Score _________2, Lovenox for DVT proph

## 2020-06-09 NOTE — H&P ADULT - HISTORY OF PRESENT ILLNESS
74 y/o woman, h/o leukemia,  anemia, HTN, p/w 1-2 days of shortness of breath, and her son Kael says that today she was mildly confused compared to baseline.  No fever/cough/CP.  COVID-19 not suspected, no close contacts or recent test.  	Most recent known labs from 5/28/20 showed WBC 21.7 as per sonKael.  	Oncologist:  Dr. Nae France  Cardiologist: Dr. Crump (Holter monitoring was recently done for tachycardia, Thursday, but no results given yet) 74 yo female with PMH of leukemia (on chronic steroids), HTN, sinus tachycardia, presented with SOB for 1 day. Spoke to son over phone, who informed me that SOB was present all the time and that pt "didnot seem alright" and seemed confused. Denies any fever, cough, denies any sick contact. Pt mostly stays home. Pt reports feeling fine, denies any complains. denies any pain. As per son, recent known labs from 5/28/20 showed WBC 21.7. Her Oncologist:  Dr. Nae France. She has h/o sinus tachycardia. Cardiologist: Dr. Crump (Holter monitoring was recently done for tachycardia, Thursday, but no results given yet).    GOC: Moustapha

## 2020-06-09 NOTE — H&P ADULT - PROBLEM SELECTOR PLAN 2
- pt had slightly altered mental status as per son . on my exam, she is AAO x3   - CT head ordered.   - AMS, could be related to PNA

## 2020-06-09 NOTE — CONSULT NOTE ADULT - ATTENDING COMMENTS
I have examined the patient at bedside and reviewed patient's data and participated in the management of the patient along with Telma RAMSAY as well as hematology/med oncology faculty during the daily heme/onc case review. I reviewed pertinent clinical information, PE,  labs as well as A/P as outline above and in agreement.

## 2020-06-09 NOTE — H&P ADULT - NSICDXPASTMEDICALHX_GEN_ALL_CORE_FT
PAST MEDICAL HISTORY:  Anemia     Essential hypertension     Fibroids     H/O rheumatic heart disease     Leukemia

## 2020-06-10 DIAGNOSIS — Z02.9 ENCOUNTER FOR ADMINISTRATIVE EXAMINATIONS, UNSPECIFIED: ICD-10-CM

## 2020-06-10 DIAGNOSIS — R93.89 ABNORMAL FINDINGS ON DIAGNOSTIC IMAGING OF OTHER SPECIFIED BODY STRUCTURES: ICD-10-CM

## 2020-06-10 DIAGNOSIS — N39.0 URINARY TRACT INFECTION, SITE NOT SPECIFIED: ICD-10-CM

## 2020-06-10 LAB
ALBUMIN SERPL ELPH-MCNC: 3.1 G/DL — LOW (ref 3.5–5)
ALP SERPL-CCNC: 41 U/L — SIGNIFICANT CHANGE UP (ref 40–120)
ALT FLD-CCNC: 22 U/L DA — SIGNIFICANT CHANGE UP (ref 10–60)
ANION GAP SERPL CALC-SCNC: 7 MMOL/L — SIGNIFICANT CHANGE UP (ref 5–17)
ANISOCYTOSIS BLD QL: SLIGHT — SIGNIFICANT CHANGE UP
AST SERPL-CCNC: 31 U/L — SIGNIFICANT CHANGE UP (ref 10–40)
BASOPHILS # BLD AUTO: 0.21 K/UL — HIGH (ref 0–0.2)
BASOPHILS NFR BLD AUTO: 1 % — SIGNIFICANT CHANGE UP (ref 0–2)
BILIRUB SERPL-MCNC: 0.6 MG/DL — SIGNIFICANT CHANGE UP (ref 0.2–1.2)
BUN SERPL-MCNC: 21 MG/DL — HIGH (ref 7–18)
CALCIUM SERPL-MCNC: 9.4 MG/DL — SIGNIFICANT CHANGE UP (ref 8.4–10.5)
CHLORIDE SERPL-SCNC: 106 MMOL/L — SIGNIFICANT CHANGE UP (ref 96–108)
CO2 SERPL-SCNC: 25 MMOL/L — SIGNIFICANT CHANGE UP (ref 22–31)
CREAT SERPL-MCNC: 0.9 MG/DL — SIGNIFICANT CHANGE UP (ref 0.5–1.3)
EOSINOPHIL # BLD AUTO: 0.04 K/UL — SIGNIFICANT CHANGE UP (ref 0–0.5)
EOSINOPHIL NFR BLD AUTO: 0.2 % — SIGNIFICANT CHANGE UP (ref 0–6)
GLUCOSE BLDC GLUCOMTR-MCNC: 122 MG/DL — HIGH (ref 70–99)
GLUCOSE BLDC GLUCOMTR-MCNC: 215 MG/DL — HIGH (ref 70–99)
GLUCOSE SERPL-MCNC: 172 MG/DL — HIGH (ref 70–99)
HCT VFR BLD CALC: 32.5 % — LOW (ref 34.5–45)
HGB BLD-MCNC: 10.2 G/DL — LOW (ref 11.5–15.5)
HYPOCHROMIA BLD QL: SLIGHT — SIGNIFICANT CHANGE UP
IMM GRANULOCYTES NFR BLD AUTO: 14.8 % — HIGH (ref 0–1.5)
LYMPHOCYTES # BLD AUTO: 0.79 K/UL — LOW (ref 1–3.3)
LYMPHOCYTES # BLD AUTO: 3.7 % — LOW (ref 13–44)
MACROCYTES BLD QL: SLIGHT — SIGNIFICANT CHANGE UP
MANUAL SMEAR VERIFICATION: SIGNIFICANT CHANGE UP
MCHC RBC-ENTMCNC: 26 PG — LOW (ref 27–34)
MCHC RBC-ENTMCNC: 31.4 GM/DL — LOW (ref 32–36)
MCV RBC AUTO: 82.7 FL — SIGNIFICANT CHANGE UP (ref 80–100)
MICROCYTES BLD QL: SLIGHT — SIGNIFICANT CHANGE UP
MONOCYTES # BLD AUTO: 1.63 K/UL — HIGH (ref 0–0.9)
MONOCYTES NFR BLD AUTO: 7.6 % — SIGNIFICANT CHANGE UP (ref 2–14)
MYELOCYTES NFR BLD: 3 % — HIGH (ref 0–0)
NEUTROPHILS # BLD AUTO: 15.64 K/UL — HIGH (ref 1.8–7.4)
NEUTROPHILS NFR BLD AUTO: 72.7 % — SIGNIFICANT CHANGE UP (ref 43–77)
NRBC # BLD: 1 /100 WBCS — HIGH (ref 0–0)
NRBC # BLD: 2 /100 — HIGH (ref 0–0)
PLAT MORPH BLD: NORMAL — SIGNIFICANT CHANGE UP
PLATELET # BLD AUTO: 103 K/UL — LOW (ref 150–400)
POIKILOCYTOSIS BLD QL AUTO: SIGNIFICANT CHANGE UP
POLYCHROMASIA BLD QL SMEAR: SLIGHT — SIGNIFICANT CHANGE UP
POTASSIUM SERPL-MCNC: 3.9 MMOL/L — SIGNIFICANT CHANGE UP (ref 3.5–5.3)
POTASSIUM SERPL-SCNC: 3.9 MMOL/L — SIGNIFICANT CHANGE UP (ref 3.5–5.3)
PROT SERPL-MCNC: 8.4 G/DL — HIGH (ref 6–8.3)
RBC # BLD: 3.93 M/UL — SIGNIFICANT CHANGE UP (ref 3.8–5.2)
RBC # FLD: 22 % — HIGH (ref 10.3–14.5)
RBC BLD AUTO: ABNORMAL
SODIUM SERPL-SCNC: 138 MMOL/L — SIGNIFICANT CHANGE UP (ref 135–145)
SPHEROCYTES BLD QL SMEAR: SIGNIFICANT CHANGE UP
VARIANT LYMPHS # BLD: 7 % — HIGH (ref 0–6)
WBC # BLD: 21.48 K/UL — HIGH (ref 3.8–10.5)
WBC # FLD AUTO: 21.48 K/UL — HIGH (ref 3.8–10.5)

## 2020-06-10 PROCEDURE — 99233 SBSQ HOSP IP/OBS HIGH 50: CPT | Mod: GC

## 2020-06-10 PROCEDURE — 99233 SBSQ HOSP IP/OBS HIGH 50: CPT

## 2020-06-10 RX ORDER — LANOLIN ALCOHOL/MO/W.PET/CERES
3 CREAM (GRAM) TOPICAL AT BEDTIME
Refills: 0 | Status: DISCONTINUED | OUTPATIENT
Start: 2020-06-10 | End: 2020-06-11

## 2020-06-10 RX ADMIN — GABAPENTIN 300 MILLIGRAM(S): 400 CAPSULE ORAL at 17:52

## 2020-06-10 RX ADMIN — GABAPENTIN 300 MILLIGRAM(S): 400 CAPSULE ORAL at 05:53

## 2020-06-10 RX ADMIN — Medication 50 MILLIGRAM(S): at 05:53

## 2020-06-10 RX ADMIN — PANTOPRAZOLE SODIUM 40 MILLIGRAM(S): 20 TABLET, DELAYED RELEASE ORAL at 05:54

## 2020-06-10 RX ADMIN — AZITHROMYCIN 255 MILLIGRAM(S): 500 TABLET, FILM COATED ORAL at 01:01

## 2020-06-10 RX ADMIN — Medication 3 MILLIGRAM(S): at 21:06

## 2020-06-10 RX ADMIN — Medication 80 MILLIGRAM(S): at 05:53

## 2020-06-10 RX ADMIN — CEFTRIAXONE 100 MILLIGRAM(S): 500 INJECTION, POWDER, FOR SOLUTION INTRAMUSCULAR; INTRAVENOUS at 00:28

## 2020-06-10 RX ADMIN — Medication 40 MILLIGRAM(S): at 05:53

## 2020-06-10 RX ADMIN — Medication 3 MILLIGRAM(S): at 01:01

## 2020-06-10 RX ADMIN — Medication 1 MILLIGRAM(S): at 11:27

## 2020-06-10 RX ADMIN — CEFTRIAXONE 100 MILLIGRAM(S): 500 INJECTION, POWDER, FOR SOLUTION INTRAMUSCULAR; INTRAVENOUS at 23:31

## 2020-06-10 RX ADMIN — ENOXAPARIN SODIUM 40 MILLIGRAM(S): 100 INJECTION SUBCUTANEOUS at 11:27

## 2020-06-10 RX ADMIN — AMLODIPINE BESYLATE 10 MILLIGRAM(S): 2.5 TABLET ORAL at 05:53

## 2020-06-10 RX ADMIN — Medication 2: at 11:27

## 2020-06-10 NOTE — DISCHARGE NOTE PROVIDER - HOSPITAL COURSE
74 yo female with PMH of leukemia (on chronic steroids), HTN, sinus tachycardia, presented with SOB for 1 day. recent known labs from 5/28/20 showed WBC 21.7. Her Oncologist:  Dr. Nae France. She has h/o sinus tachycardia. Cardiologist: Dr. Crump (Holter monitoring was recently done for tachycardia, Thursday, but no results given yet).        Pt is admitted for acute encephalopathy likely due to UTI and respiratory distress.   Rocephine and Zithromax started in ED.  COVID test is negative. CTA chest shows  < from: CT Angio Chest w/ IV Cont (06.09.20 @ 01:06) > Suboptimal exam for detection of pulmonary emboli due to poor opacification of the pulmonary arteries. No large saddle embolus. CXR resulted atelectasis. Pulmonary followed. ruled out PNA and pulmonary embolism. d/c'd Zithromax.     UA is positive. pending urine cx. Blood cx NGTD. Pt w/ leukocytosis likely due to leukemia and chronic steroid use. Heme/onc followed.     PT evaluated and recommended home PT.              incomplete    pt can d/c with oral abt to complete 14 days of course when urine cx result is back. 76 yo female with PMH of leukemia (on chronic steroids), HTN, sinus tachycardia, presented with SOB for 1 day. recent known labs from 5/28/20 showed WBC 21.7. Her Oncologist:  Dr. Nae France. She has h/o sinus tachycardia. Cardiologist: Dr. Crump (Holter monitoring was recently done for tachycardia, Thursday, but no results given yet).        Pt is admitted for acute encephalopathy likely due to UTI and respiratory distress.   Rocephine and Zithromax started in ED.  COVID test is negative. CTA chest shows  < from: CT Angio Chest w/ IV Cont (06.09.20 @ 01:06) > Suboptimal exam for detection of pulmonary emboli due to poor opacification of the pulmonary arteries. No large saddle embolus. CXR resulted atelectasis. Pulmonary followed. no apparent evidence of PNA and pulmonary embolism from pulmonary standpoint. d/c'd Zithromax. Encouraged to use incentive spirometer.     UA is positive. pending urine cx. Blood cx NGTD. Pt w/ leukocytosis likely due to leukemia and chronic steroid use. Heme/onc followed.     PT evaluated and recommended home PT.              incomplete    pt can d/c with oral abt to complete 14 days of course when urine cx result is back. 74 yo female with PMH of leukemia (on chronic steroids), HTN, sinus tachycardia, presented with SOB for 1 day. recent known labs from 5/28/20 showed WBC 21.7. Her Oncologist:  Dr. Nae France. She has h/o sinus tachycardia. Cardiologist: Dr. Crump (Holter monitoring was recently done for tachycardia, Thursday, but no results given yet).        Pt is admitted for acute encephalopathy likely due to UTI and respiratory distress.   Rocephine and Zithromax started in ED.  COVID test is negative. CTA chest shows  < from: CT Angio Chest w/ IV Cont (06.09.20 @ 01:06) > Suboptimal exam for detection of pulmonary emboli due to poor opacification of the pulmonary arteries. No large saddle embolus. CXR resulted atelectasis. Pulmonary followed. no apparent evidence of PNA and pulmonary embolism from pulmonary standpoint. d/c'd Zithromax. Encouraged to use incentive spirometer.     UA is positive. pending urine cx. Blood cx NGTD. Pt w/ leukocytosis likely due to leukemia and chronic steroid use. Heme/onc followed.     PT evaluated and recommended home PT.      Pt. is medically stable for discharge to home with home PT to complete course of antibiotics with oral Ceftin x 1 week.

## 2020-06-10 NOTE — CHART NOTE - NSCHARTNOTEFT_GEN_A_CORE
EVENT:      76 y/o female with PMH of leukemia (on chronic steroids), HTN, sinus tachycardia, presented with SOB for 1 day. Patient's son over phone, who informed that SOB was presented all the time and that patient "did not seem alright" and seemed confused.   At 12am, patient c/o insomnia and requested meds.   OBJECTIVE:  Vital Signs Last 24 Hrs  T(C): 37 (09 Jun 2020 22:11), Max: 37.4 (09 Jun 2020 17:30)  T(F): 98.6 (09 Jun 2020 22:11), Max: 99.3 (09 Jun 2020 17:30)  HR: 97 (09 Jun 2020 22:11) (69 - 97)  BP: 136/88 (09 Jun 2020 22:11) (121/79 - 136/88)  BP(mean): --  RR: 14 (09 Jun 2020 22:11) (14 - 18)  SpO2: 97% (09 Jun 2020 22:11) (97% - 100%)    FOCUSED PHYSICAL EXAM:    LABS:                        10.2   26.14 )-----------( 87       ( 09 Jun 2020 06:12 )             33.2   CARDIAC MARKERS ( 09 Jun 2020 06:12 )  <0.015 ng/mL / x     / x     / x     / x      CARDIAC MARKERS ( 08 Jun 2020 21:22 )  <0.015 ng/mL / x     / x     / x     / x        06-09    138  |  103  |  13  ----------------------------<  108<H>  3.5   |  26  |  0.81    Ca    8.9      09 Jun 2020 06:12  Phos  4.5     06-09  Mg     2.4     06-09    TPro  7.9  /  Alb  2.9<L>  /  TBili  0.9  /  DBili  x   /  AST  29  /  ALT  16  /  AlkPhos  37<L>  06-09    PLAN: Melatonin 3 mg po QHS for c/o insomnia.     FOLLOW UP / RESULT: Maintain patient's safety and comfort level.

## 2020-06-10 NOTE — PROGRESS NOTE ADULT - PROBLEM SELECTOR PLAN 1
likely toxic due to PNA vs. UTI  CT chest and CXR as above.  mental status returned at baseline AOx3  p/w fever but afebrile > 24hrs. leukocytosis due to steroid, hx leukemia  UA +. pending urine culture  blood cx NGTD  started Zithromax and Rocephin in ED.   D/C Zithromax.   c/w Rocephin for UTI. likely toxic due to PNA vs. UTI  CT chest and CXR as above.  COVID is negative  mental status returned at baseline AOx3  p/w fever but afebrile > 24hrs. leukocytosis due to steroid, hx leukemia  UA +. pending urine culture  blood cx NGTD  started Zithromax and Rocephin in ED.   D/C Zithromax.   c/w Rocephin for UTI.

## 2020-06-10 NOTE — CONSULT NOTE ADULT - ASSESSMENT
10.2   21.48 )-----------( x        ( 10 Sd 2020 16:00 )             32.5   06-10    138  |  106  |  21<H>  ----------------------------<  172<H>  3.9   |  25  |  0.90    Ca    9.4      10 Sd 2020 16:00  Phos  4.5     06-09  Mg     2.4     06-09    TPro  8.4<H>  /  Alb  3.1<L>  /  TBili  0.6  /  DBili  x   /  AST  31  /  ALT  22  /  AlkPhos  41  06-10    75F with obesity, chronic steroid use, leukemia, admitted with UTI and SOB, which has since resolved. I personally reviewed her CT chest - there are no visible filling defects in the main or segmental pulmonary arteries but contrast timing of the study is suboptimal. There are areas of subsegmental atelectasis at the bases bilaterally, which appear unchanged compared to lung cuts from CT abd in 2019. Pt is obese, sedentary and deconditioned. She also snores and has a crowded oropharynx and daytime sleepiness.     Recommend:  Incentive spirometry  Exercise and weight loss program  Outpatient home sleep study - will wisam pt once ambulatory services in business.    Thank you for this consult.   Please call with any further questions.

## 2020-06-10 NOTE — DISCHARGE NOTE PROVIDER - NSDCCPCAREPLAN_GEN_ALL_CORE_FT
PRINCIPAL DISCHARGE DIAGNOSIS  Diagnosis: Urinary tract infection  Assessment and Plan of Treatment: Urinary tract infection      SECONDARY DISCHARGE DIAGNOSES  Diagnosis: Encephalopathy acute  Assessment and Plan of Treatment: Encephalopathy acute    Diagnosis: Abnormal CT of the chest  Assessment and Plan of Treatment: Abnormal CT of the chest    Diagnosis: Sinus tachycardia  Assessment and Plan of Treatment: Sinus tachycardia    Diagnosis: Leukemia  Assessment and Plan of Treatment: Leukemia PRINCIPAL DISCHARGE DIAGNOSIS  Diagnosis: Urinary tract infection  Assessment and Plan of Treatment: You were treated with IV antibiotic for urinary tract infection.  complete oral antibiotic as prescribed.  Follow up with your primary doctor after discharge. report symptoms of fever, chills, or burning on urination, blood in urine.      SECONDARY DISCHARGE DIAGNOSES  Diagnosis: Atelectasis  Assessment and Plan of Treatment: You were followed by pulmonary doctor for abnormal chest CT and chest X ray which shows atelectasis, also called collaped lungs.  No apparent evidence of pneumonia or embolism as per pulmonary standpoint.   Continue breathing exercise with incentive spirometer as directed   follow up with pulmonology at outpatient.    Diagnosis: Encephalopathy acute  Assessment and Plan of Treatment: You were monitore for change in mental status likely due to UTI.   Resolved with antibiotic therapy.       Diagnosis: Sinus tachycardia  Assessment and Plan of Treatment: Your heart rate was monitored during hospital stay and normal rate.  Follow up with your cardiology at outpatient clinic for holter monitor result.    Diagnosis: Leukemia  Assessment and Plan of Treatment: Continue taking steroid as per oncologist.  Follow up with your oncologist after discharge. PRINCIPAL DISCHARGE DIAGNOSIS  Diagnosis: Urinary tract infection  Assessment and Plan of Treatment: You were treated with IV antibiotic for urinary tract infection.  complete oral antibiotic as prescribed.  Follow up with your primary doctor after discharge. report symptoms of fever, chills, or burning on urination, blood in urine.      SECONDARY DISCHARGE DIAGNOSES  Diagnosis: Atelectasis  Assessment and Plan of Treatment: You were followed by pulmonary doctor for abnormal chest CT and chest X ray which shows atelectasis, also called collaped lungs.  No apparent evidence of pneumonia or embolism as per pulmonary standpoint.   Continue breathing exercise with incentive spirometer as directed   follow up with pulmonology at outpatient.    Diagnosis: Leukemia  Assessment and Plan of Treatment: Continue taking steroid as per oncologist.  Follow up with your oncologist after discharge.    Diagnosis: Encephalopathy acute  Assessment and Plan of Treatment: You were monitore for change in mental status likely due to UTI.   Resolved with antibiotic therapy.       Diagnosis: Sinus tachycardia  Assessment and Plan of Treatment: Your heart rate was monitored during hospital stay and normal rate.  Follow up with your cardiology at outpatient clinic for holter monitor result.

## 2020-06-10 NOTE — DISCHARGE NOTE PROVIDER - CARE PROVIDER_API CALL
Arcelia Englishn Parowan  Internal Medicine  [address] [place] [address]  Phone: [phone]  Fax: [phone]  Follow Up Time: 1 week

## 2020-06-10 NOTE — DISCHARGE NOTE PROVIDER - NSDCMRMEDTOKEN_GEN_ALL_CORE_FT
amLODIPine 10 mg oral tablet: 1 tab(s) orally once a day  ferrous sulfate 325 mg (65 mg elemental iron) oral tablet: 1 tab(s) orally once a day  folic acid 1 mg oral tablet: 1 tab(s) orally once a day  furosemide 40 mg oral tablet: 1 tab(s) orally once a day  gabapentin 300 mg oral capsule: 1 cap(s) orally 2 times a day  Metoprolol Succinate ER 50 mg oral tablet, extended release: 1 tab(s) orally once a day  predniSONE 20 mg oral tablet: 4 tab(s) orally once a day  prochlorperazine 10 mg oral tablet: 1 tab(s) orally 3 times a day, As Needed amLODIPine 10 mg oral tablet: 1 tab(s) orally once a day  cefuroxime 500 mg oral tablet: 1 tab(s) orally 2 times a day   ferrous sulfate 325 mg (65 mg elemental iron) oral tablet: 1 tab(s) orally once a day  folic acid 1 mg oral tablet: 1 tab(s) orally once a day  furosemide 40 mg oral tablet: 1 tab(s) orally once a day  gabapentin 300 mg oral capsule: 1 cap(s) orally 2 times a day  Metoprolol Succinate ER 50 mg oral tablet, extended release: 1 tab(s) orally once a day  pantoprazole 40 mg oral delayed release tablet: 1 tab(s) orally once a day (before a meal)  predniSONE 20 mg oral tablet: 4 tab(s) orally once a day  prochlorperazine 10 mg oral tablet: 1 tab(s) orally 3 times a day, As Needed

## 2020-06-10 NOTE — CONSULT NOTE ADULT - SUBJECTIVE AND OBJECTIVE BOX
75F with h/o obesity, leukemia/MDS, admitted with fever and leukocytosis as well as confusion and SOB for 1 day. Pt was dx with UTI and clinically responded well to abx and her mental status is back no normal. She currently denies SOB at rest but has not walked much. She reports being very sedentary the last few months but can perform all ADLs without sob. No chronic cough, no phlegm, no h/o asthma, lifelong nonsmoker. Denies leg swelling or calf pain, no h/o blood clots.   Pt does report snoring and some daytime sleepiness as well as weight gain.    PMH: HTN, leukemia    FMH: no h/o lung disease    Soc: no smoking, no alcohol    Meds: amlodipine, metoprolol, gabapentin, prednisone    All: none    Vital Signs Last 24 Hrs  T(C): 36.6 (10 Sd 2020 14:12), Max: 37.4 (09 Jun 2020 17:30)  T(F): 97.9 (10 Sd 2020 14:12), Max: 99.3 (09 Jun 2020 17:30)  HR: 84 (10 Sd 2020 14:12) (80 - 97)  BP: 111/62 (10 Sd 2020 14:12) (111/62 - 136/88)  BP(mean): --  RR: 14 (10 Sd 2020 14:12) (14 - 18)  SpO2: 100% (10 Sd 2020 14:12) (97% - 100%)

## 2020-06-10 NOTE — PROGRESS NOTE ADULT - PROBLEM SELECTOR PLAN 7
Pt has cardiology OP- dr. Crump  recent hx of holter monitor  HR bet. 84 and 97 in patient  f/u OP cardiology  monitor BP/HR

## 2020-06-10 NOTE — PROGRESS NOTE ADULT - ASSESSMENT
Assessment and Plan:      · Assessment		  74 yo female with PMH of leukemia (on chronic steroids), HTN, sinus tachycardia, presented with SOB for 1 day and AMS. 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 France states last [id number]  No spontaneous bleeding / bruising.   -CTM.   will check LDH/retic/haptoglobin  -CBC daily  -1U SDP Tx for Plts < 10K     Problem# VTE Prophylaxis    on Lovenox 40mg      Thank you for the referral. Will continue to monitor the patient.  Any questions please call [phone]

## 2020-06-10 NOTE — PROGRESS NOTE ADULT - PROBLEM SELECTOR PLAN 2
CTA shows Suboptimal exam for detection of pulmonary emboli due to poor opacification of the pulmonary arteries. No large saddle embolus.   CXR resulted atelectasis. Pulmonary consulted.   Pulmonary dr. Martínez consulted- r/o Pul. embolism and PNA  d/c Zithromax

## 2020-06-10 NOTE — PROGRESS NOTE ADULT - PROBLEM SELECTOR PLAN 4
hx of leukemia. pt followed OP heme/onc. dr. France  c/w prednisone  normal serum cortisol level  heme/onc dr. Melo following  monitor cbc  f/u OP heme/onc

## 2020-06-11 VITALS
RESPIRATION RATE: 16 BRPM | SYSTOLIC BLOOD PRESSURE: 117 MMHG | TEMPERATURE: 98 F | DIASTOLIC BLOOD PRESSURE: 62 MMHG | HEART RATE: 69 BPM | OXYGEN SATURATION: 99 %

## 2020-06-11 LAB
ANION GAP SERPL CALC-SCNC: 14 MMOL/L — SIGNIFICANT CHANGE UP (ref 5–17)
BASOPHILS # BLD AUTO: 0 K/UL — SIGNIFICANT CHANGE UP (ref 0–0.2)
BASOPHILS NFR BLD AUTO: 0 % — SIGNIFICANT CHANGE UP (ref 0–2)
BUN SERPL-MCNC: 25 MG/DL — HIGH (ref 7–18)
CALCIUM SERPL-MCNC: 9.7 MG/DL — SIGNIFICANT CHANGE UP (ref 8.4–10.5)
CHLORIDE SERPL-SCNC: 105 MMOL/L — SIGNIFICANT CHANGE UP (ref 96–108)
CO2 SERPL-SCNC: 24 MMOL/L — SIGNIFICANT CHANGE UP (ref 22–31)
CREAT SERPL-MCNC: 0.81 MG/DL — SIGNIFICANT CHANGE UP (ref 0.5–1.3)
EOSINOPHIL # BLD AUTO: 0.52 K/UL — HIGH (ref 0–0.5)
EOSINOPHIL NFR BLD AUTO: 2 % — SIGNIFICANT CHANGE UP (ref 0–6)
GLUCOSE BLDC GLUCOMTR-MCNC: 117 MG/DL — HIGH (ref 70–99)
GLUCOSE BLDC GLUCOMTR-MCNC: 142 MG/DL — HIGH (ref 70–99)
GLUCOSE BLDC GLUCOMTR-MCNC: 147 MG/DL — HIGH (ref 70–99)
GLUCOSE SERPL-MCNC: 83 MG/DL — SIGNIFICANT CHANGE UP (ref 70–99)
HAPTOGLOB SERPL-MCNC: 184 MG/DL — SIGNIFICANT CHANGE UP (ref 34–200)
HCT VFR BLD CALC: 31.1 % — LOW (ref 34.5–45)
HGB BLD-MCNC: 9.7 G/DL — LOW (ref 11.5–15.5)
LDH SERPL L TO P-CCNC: 369 U/L — HIGH (ref 120–225)
LYMPHOCYTES # BLD AUTO: 11 % — LOW (ref 13–44)
LYMPHOCYTES # BLD AUTO: 2.88 K/UL — SIGNIFICANT CHANGE UP (ref 1–3.3)
MANUAL SMEAR VERIFICATION: SIGNIFICANT CHANGE UP
MCHC RBC-ENTMCNC: 26.1 PG — LOW (ref 27–34)
MCHC RBC-ENTMCNC: 31.2 GM/DL — LOW (ref 32–36)
MCV RBC AUTO: 83.8 FL — SIGNIFICANT CHANGE UP (ref 80–100)
MONOCYTES # BLD AUTO: 6.29 K/UL — HIGH (ref 0–0.9)
MONOCYTES NFR BLD AUTO: 24 % — HIGH (ref 2–14)
MRSA PCR RESULT.: SIGNIFICANT CHANGE UP
NEUTROPHILS # BLD AUTO: 16.5 K/UL — HIGH (ref 1.8–7.4)
NEUTROPHILS NFR BLD AUTO: 63 % — SIGNIFICANT CHANGE UP (ref 43–77)
NRBC # BLD: 0 /100 — SIGNIFICANT CHANGE UP (ref 0–0)
PLAT MORPH BLD: NORMAL — SIGNIFICANT CHANGE UP
PLATELET # BLD AUTO: 102 K/UL — LOW (ref 150–400)
POTASSIUM SERPL-MCNC: 3.7 MMOL/L — SIGNIFICANT CHANGE UP (ref 3.5–5.3)
POTASSIUM SERPL-SCNC: 3.7 MMOL/L — SIGNIFICANT CHANGE UP (ref 3.5–5.3)
RBC # BLD: 3.71 M/UL — LOW (ref 3.8–5.2)
RBC # BLD: 3.71 M/UL — LOW (ref 3.8–5.2)
RBC # FLD: 22.3 % — HIGH (ref 10.3–14.5)
RBC BLD AUTO: NORMAL — SIGNIFICANT CHANGE UP
RETICS #: 163.8 K/UL — HIGH (ref 25–125)
RETICS/RBC NFR: 4.4 % — HIGH (ref 0.5–2.5)
S AUREUS DNA NOSE QL NAA+PROBE: SIGNIFICANT CHANGE UP
SODIUM SERPL-SCNC: 143 MMOL/L — SIGNIFICANT CHANGE UP (ref 135–145)
WBC # BLD: 26.19 K/UL — HIGH (ref 3.8–10.5)
WBC # FLD AUTO: 26.19 K/UL — HIGH (ref 3.8–10.5)

## 2020-06-11 PROCEDURE — 82306 VITAMIN D 25 HYDROXY: CPT

## 2020-06-11 PROCEDURE — 83036 HEMOGLOBIN GLYCOSYLATED A1C: CPT

## 2020-06-11 PROCEDURE — 82533 TOTAL CORTISOL: CPT

## 2020-06-11 PROCEDURE — 70450 CT HEAD/BRAIN W/O DYE: CPT

## 2020-06-11 PROCEDURE — 99239 HOSP IP/OBS DSCHRG MGMT >30: CPT

## 2020-06-11 PROCEDURE — 85027 COMPLETE CBC AUTOMATED: CPT

## 2020-06-11 PROCEDURE — 83010 ASSAY OF HAPTOGLOBIN QUANT: CPT

## 2020-06-11 PROCEDURE — 84443 ASSAY THYROID STIM HORMONE: CPT

## 2020-06-11 PROCEDURE — U0003: CPT

## 2020-06-11 PROCEDURE — 99285 EMERGENCY DEPT VISIT HI MDM: CPT | Mod: 25

## 2020-06-11 PROCEDURE — 83735 ASSAY OF MAGNESIUM: CPT

## 2020-06-11 PROCEDURE — 99053 MED SERV 10PM-8AM 24 HR FAC: CPT

## 2020-06-11 PROCEDURE — 80053 COMPREHEN METABOLIC PANEL: CPT

## 2020-06-11 PROCEDURE — 82746 ASSAY OF FOLIC ACID SERUM: CPT

## 2020-06-11 PROCEDURE — 85730 THROMBOPLASTIN TIME PARTIAL: CPT

## 2020-06-11 PROCEDURE — 83550 IRON BINDING TEST: CPT

## 2020-06-11 PROCEDURE — 85610 PROTHROMBIN TIME: CPT

## 2020-06-11 PROCEDURE — 71045 X-RAY EXAM CHEST 1 VIEW: CPT

## 2020-06-11 PROCEDURE — 71275 CT ANGIOGRAPHY CHEST: CPT

## 2020-06-11 PROCEDURE — 36415 COLL VENOUS BLD VENIPUNCTURE: CPT

## 2020-06-11 PROCEDURE — 93005 ELECTROCARDIOGRAM TRACING: CPT

## 2020-06-11 PROCEDURE — 83540 ASSAY OF IRON: CPT

## 2020-06-11 PROCEDURE — 82728 ASSAY OF FERRITIN: CPT

## 2020-06-11 PROCEDURE — 87040 BLOOD CULTURE FOR BACTERIA: CPT

## 2020-06-11 PROCEDURE — 80061 LIPID PANEL: CPT

## 2020-06-11 PROCEDURE — 83615 LACTATE (LD) (LDH) ENZYME: CPT

## 2020-06-11 PROCEDURE — 97161 PT EVAL LOW COMPLEX 20 MIN: CPT

## 2020-06-11 PROCEDURE — 85045 AUTOMATED RETICULOCYTE COUNT: CPT

## 2020-06-11 PROCEDURE — 82962 GLUCOSE BLOOD TEST: CPT

## 2020-06-11 PROCEDURE — 84466 ASSAY OF TRANSFERRIN: CPT

## 2020-06-11 PROCEDURE — 81001 URINALYSIS AUTO W/SCOPE: CPT

## 2020-06-11 PROCEDURE — 80048 BASIC METABOLIC PNL TOTAL CA: CPT

## 2020-06-11 PROCEDURE — 84100 ASSAY OF PHOSPHORUS: CPT

## 2020-06-11 PROCEDURE — 87640 STAPH A DNA AMP PROBE: CPT

## 2020-06-11 PROCEDURE — 84484 ASSAY OF TROPONIN QUANT: CPT

## 2020-06-11 PROCEDURE — 82607 VITAMIN B-12: CPT

## 2020-06-11 PROCEDURE — 83880 ASSAY OF NATRIURETIC PEPTIDE: CPT

## 2020-06-11 PROCEDURE — 87641 MR-STAPH DNA AMP PROBE: CPT

## 2020-06-11 RX ORDER — CEFUROXIME AXETIL 250 MG
1 TABLET ORAL
Qty: 14 | Refills: 0
Start: 2020-06-11 | End: 2020-06-17

## 2020-06-11 RX ORDER — PANTOPRAZOLE SODIUM 20 MG/1
1 TABLET, DELAYED RELEASE ORAL
Qty: 30 | Refills: 0
Start: 2020-06-11 | End: 2020-07-10

## 2020-06-11 RX ADMIN — PANTOPRAZOLE SODIUM 40 MILLIGRAM(S): 20 TABLET, DELAYED RELEASE ORAL at 06:03

## 2020-06-11 RX ADMIN — Medication 40 MILLIGRAM(S): at 05:32

## 2020-06-11 RX ADMIN — Medication 80 MILLIGRAM(S): at 05:32

## 2020-06-11 RX ADMIN — GABAPENTIN 300 MILLIGRAM(S): 400 CAPSULE ORAL at 05:31

## 2020-06-11 RX ADMIN — Medication 1 MILLIGRAM(S): at 13:58

## 2020-06-11 RX ADMIN — GABAPENTIN 300 MILLIGRAM(S): 400 CAPSULE ORAL at 17:11

## 2020-06-11 RX ADMIN — AMLODIPINE BESYLATE 10 MILLIGRAM(S): 2.5 TABLET ORAL at 05:32

## 2020-06-11 RX ADMIN — Medication 50 MILLIGRAM(S): at 05:32

## 2020-06-11 NOTE — PROGRESS NOTE ADULT - SUBJECTIVE AND OBJECTIVE BOX
Incomplete Note-    · Subjective and Objective: 	   History of Present Illness:  Reason for Admission: Shortness of breath	  History of Present Illness: 	  74 yo female with PMH of leukemia (on chronic steroids), HTN, sinus tachycardia, presented with SOB for 1 day. Spoke to son over phone, who informed me that SOB was present all the time and that pt "didnot seem alright" and seemed confused. Denies any fever, cough, denies any sick contact. Pt mostly stays home. Pt reports feeling fine, denies any complains. denies any pain. As per son, recent known labs from 5/28/20 showed WBC 21.7. Her Oncologist:  Dr. Nae France. She has h/o sinus tachycardia. Cardiologist: Dr. Crump (Holter monitoring was recently done for tachycardia, Thursday, but no results given yet).    [facility]:      OncHx: I personally spoke with Dr. France and pt has MDS and Autoimmune Hemolytic Anemia. She has been receiving IVIG q 3 weeks and is taking daily prednisone 5mg. Denies weight loss, bleeding, chronic infections, fever       Review of Systems:  Other Review of Systems: All other review of systems negative, except as noted in HPI 	      Allergies and Intolerances:        Allergies:  	No Known Allergies:     Home Medications:   * Patient Currently Takes Medications as of 09-Jun-2020 06:22 documented in Structured Notes  · 	amLODIPine 10 mg oral tablet: 1 tab(s) orally once a day  · 	predniSONE 20 mg oral tablet: 4 tab(s) orally once a day  · 	ferrous sulfate 325 mg (65 mg elemental iron) oral tablet: 1 tab(s) orally once a day  · 	folic acid 1 mg oral tablet: 1 tab(s) orally once a day  · 	furosemide 40 mg oral tablet: 1 tab(s) orally once a day  · 	gabapentin 300 mg oral capsule: 1 cap(s) orally 2 times a day  · 	Metoprolol Succinate ER 50 mg oral tablet, extended release: 1 tab(s) orally once a day  · 	prochlorperazine 10 mg oral tablet: 1 tab(s) orally 3 times a day, As Needed    Patient History:    Past Medical, Past Surgical, and Family History:  PAST MEDICAL HISTORY:  Anemia , Essential hypertension, Fibroids, H/O rheumatic heart disease, Leukemia.     PAST SURGICAL HISTORY:  H/O abdominal hysterectomy.     FAMILY HISTORY:  FH: breast cancer, sister.     Social History:  Social History (marital status, living situation, occupation, tobacco use, alcohol and drug use, and sexual history): from home   self ambulates   lives with son  denies alcohol, denies smoking	     Tobacco Screening:  · Core Measure Site	Yes	  · Has the patient used tobacco in the past 30 days?	No	    Risk Assessment:    Present on Admission:  Deep Venous Thrombosis	no	  Pulmonary Embolus	suspected	     Heart Failure:  Does this patient have a history of or has been diagnosed with heart failure? unknown.    Vitals:  Vital Signs Last 24 Hrs  T(C): 36.6 (11 Jun 2020 04:55), Max: 36.7 (10 Sd 2020 20:00)  T(F): 97.9 (11 Jun 2020 04:55), Max: 98.1 (10 Sd 2020 20:00)  HR: 66 (11 Jun 2020 04:55) (66 - 85)  BP: 123/67 (11 Jun 2020 04:55) (111/62 - 132/72)  BP(mean): --  ABP: --  ABP(mean): --  RR: 16 (11 Jun 2020 04:55) (14 - 16)  SpO2: 100% (11 Jun 2020 04:55) (98% - 100%)        Physical Exam:     	GENERAL: NAD, AAOx3  	HEENT: Normocephalic;  conjunctivae and sclerae clear; moist mucous membranes;   	NECK : supple  	CHEST/LUNG: Clear to auscultation bilaterally with good air entry   	HEART: S1 S2  regular; no murmurs, gallops or rubs  	ABDOMEN: Soft, Nontender, Nondistended; BS+, no hepatosplenomegaly  	EXTREMITIES: no cyanosis; no edema; no calf tenderness  	SKIN: warm and dry; no rash  NERVOUS SYSTEM:  Awake and alert; Oriented  to place, person and time ; no new deficit        Laboratory:       CBC Full  -  ( 11 Jun 2020 06:59 )  WBC Count : 26.19 K/uL  RBC Count : 3.71 M/uL  Hemoglobin : 9.7 g/dL  Hematocrit : 31.1 %  Platelet Count - Automated : 102 K/uL  Mean Cell Volume : 83.8 fl  Mean Cell Hemoglobin : 26.1 pg  Mean Cell Hemoglobin Concentration : 31.2 gm/dL  Auto Neutrophil # : x  Auto Lymphocyte # : x  Auto Monocyte # : x  Auto Eosinophil # : x  Auto Basophil # : x  Auto Neutrophil % : x  Auto Lymphocyte % : x  Auto Monocyte % : x  Auto Eosinophil % : x  Auto Basophil % : x    06-11    143  |  105  |  25<H>  ----------------------------<  83  3.7   |  24  |  0.81    Ca    9.7      11 Jun 2020 06:59    TPro  8.4<H>  /  Alb  3.1<L>  /  TBili  0.6  /  DBili  x   /  AST  31  /  ALT  22  /  AlkPhos  41  06-10        Radiology:       < from: CT Head No Cont (06.09.20 @ 08:47) >  XAM:  CT BRAIN                            PROCEDURE DATE:  06/09/2020          INTERPRETATION:      CT head without IV contrast        CLINICAL INFORMATION:  Altered mental status    TECHNIQUE: Contiguous axial 5 mm sections were obtained through the head. Sagittal and coronal 2-D reformatted images were also obtained.   This scan was performed using automatic exposure control (radiation dose reduction software) to obtain a diagnostic image quality scan with patient dose as low as reasonably achievable.     FINDINGS:   No previous examinations are available for review.    The brain demonstrates focal encephalomalacia and gliosis in the posterior RIGHT LEFT frontal lobe at the convexity, likely sequela of old infarction.   No acute cerebralcortical infarct is seen.  No intracranial hemorrhage is found.  No mass effect is found in the brain.      The ventricles, sulci and basal cisterns appear unremarkable.    The orbits are unremarkable.  The paranasal sinuses are clear.  The nasal cavity appears intact.  The nasopharynx is symmetric.  The central skull base, petrous temporal bones and calvarium remain intact.      IMPRESSION:   focal encephalomalacia and gliosis in the posterior RIGHT LEFT frontal lobe at the convexity, likely sequela of old infarction.                < end of copied text >    CT:	    09-Jun-2020 01:06, CT Angio Chest w/ IV Cont	  CT Angio Chest w/ IV Cont: EXAM:  CT ANGIO CHEST (W)AW IC                            PROCEDURE DATE:  06/09/2020          INTERPRETATION:  CLINICAL INFORMATION: Shortness of breath and leukocytosis    COMPARISON: CT chest 4/3/2019    PROCEDURE:   CT Angiography of the Chest.  60 ml of Omnipaque 350 was injected intravenously. 40 ml were discarded.  Sagittal and coronal reformats were performed as well as 3D (MIP) reconstructions.    FINDINGS:    LUNGS AND AIRWAYS: Patent central airways.  Bilateral lower lobe subsegmental atelectasis.  PLEURA: No pleural effusion.  MEDIASTINUM AND EILEEN: No lymphadenopathy.  VESSELS: Suboptimal opacification of the pulmonary arteries limits evaluation for pulmonary embolus. No large saddle embolus. Normal caliber main pulmonary artery. Aorta and great vessels are within normal limits..  HEART: Heart size is normal. Trace pericardial fluid.  CHEST WALL AND LOWER NECK: Within normal limits.  VISUALIZED UPPER ABDOMEN: Stable left adrenal nodule measures 1.8 cm..  BONES: Degenerative changes of the spine.    IMPRESSION:   Suboptimal exam for detection of pulmonary emboli due to poor opacification of the pulmonary arteries. No large saddle embolus.   HAO MARCELINO M.D., ATTENDING RADIOLOGIST  This document has been electronically signed. Jun 9 2020  1:26AM	  X-Ray, Fluoroscopy:	  PACS Image: Image(s) Available< from: Xray Chest 1 View AP/PA (06.08.20 @ 21:10) > EXAM:  XR CHEST AP OR PA 1V                         PROCEDURE DATE:  06/08/2020      INTERPRETATION:  AP chest on June 8, 2020 at 8:57 PM. Patient is short of breath.  Heart is likely enlarged.  Small bandlike atelectases are increased from January 4.  IMPRESSION: Increasing band like atelectases. Heart enlargement again noted.  CLAUDIA WHITE M.D., ATTENDING RADIOLOGIST This document has been electronically signed. Jun 9 2020  7:36AM     < end of copied text >
NP Note discussed with  Primary Attending    Patient is a 75y old  Female who presents with a chief complaint of Shortness of breath (10 Sd 2020 13:26)    HPI- 76 yo female with PMH of leukemia (on chronic steroids), HTN, sinus tachycardia, presented with SOB for 1 day. Spoke to son over phone, who informed me that SOB was present all the time and that pt "didnot seem alright" and seemed confused. Denies any fever, cough, denies any sick contact. Pt mostly stays home. Pt reports feeling fine, denies any complains. denies any pain. As per son, recent known labs from 20 showed WBC 21.7. Her Oncologist:  Dr. Nae France. She has h/o sinus tachycardia. Cardiologist: Dr. Crump (Holter monitoring was recently done for tachycardia, Thursday, but no results given yet).    Pt is admitted for acute encephalopathy likely due to infection (PNA vs. UTI) and SOB.   Rocephine and Zithromax started in ED.  COVID test is negative. CTA chest shows  < from: CT Angio Chest w/ IV Cont (20 @ 01:06) > Suboptimal exam for detection of pulmonary emboli due to poor opacification of the pulmonary arteries. No large saddle embolus. CXR resulted atelectasis. Pulmonary consulted.   UA is positive. pending urine cx. Blood cx NGTD. Pt w/ leukocytosis likely due to leukemia and chronic steroid use. Heme/onc followed.   PT evaluated and recommended home PT.           GOC: Full code      INTERVAL HPI/OVERNIGHT EVENTS:  Pt is seen at bedside. alert and oriented x 3. denies any pain or discomfort. states she feels much better today.  Saturaing 97% on room air.  Afebrile.     MEDICATIONS  (STANDING):  amLODIPine   Tablet 10 milliGRAM(s) Oral daily  cefTRIAXone   IVPB 1000 milliGRAM(s) IV Intermittent every 24 hours  dextrose 5%. 1000 milliLiter(s) (50 mL/Hr) IV Continuous <Continuous>  dextrose 50% Injectable 12.5 Gram(s) IV Push once  dextrose 50% Injectable 25 Gram(s) IV Push once  dextrose 50% Injectable 25 Gram(s) IV Push once  enoxaparin Injectable 40 milliGRAM(s) SubCutaneous daily  ferrous    sulfate 325 milliGRAM(s) Oral daily  folic acid 1 milliGRAM(s) Oral daily  furosemide    Tablet 40 milliGRAM(s) Oral daily  gabapentin 300 milliGRAM(s) Oral two times a day  insulin lispro (HumaLOG) corrective regimen sliding scale   SubCutaneous three times a day before meals  melatonin 3 milliGRAM(s) Oral at bedtime  metoprolol succinate ER 50 milliGRAM(s) Oral daily  pantoprazole    Tablet 40 milliGRAM(s) Oral before breakfast  predniSONE   Tablet 80 milliGRAM(s) Oral daily    MEDICATIONS  (PRN):  acetaminophen   Tablet .. 650 milliGRAM(s) Oral every 6 hours PRN Temp greater or equal to 38C (100.4F), Mild Pain (1 - 3)  dextrose 40% Gel 15 Gram(s) Oral once PRN Blood Glucose LESS THAN 70 milliGRAM(s)/deciliter  glucagon  Injectable 1 milliGRAM(s) IntraMuscular once PRN Glucose LESS THAN 70 milligrams/deciliter      __________________________________________________  REVIEW OF SYSTEMS:    CONSTITUTIONAL: No fever,   EYES: no acute visual disturbances  NECK: No pain or stiffness  RESPIRATORY: No cough; No shortness of breath  CARDIOVASCULAR: No chest pain, no palpitations  GASTROINTESTINAL: No pain. No nausea or vomiting; No diarrhea   NEUROLOGICAL: No headache or numbness, no tremors  MUSCULOSKELETAL: No joint pain, no muscle pain  GENITOURINARY: no dysuria, no frequency, no hesitancy  PSYCHIATRY: no depression , no anxiety  ALL OTHER  ROS negative        Vital Signs Last 24 Hrs  T(C): 36.6 (10 Sd 2020 14:12), Max: 37.4 (2020 17:30)  T(F): 97.9 (10 Sd 2020 14:12), Max: 99.3 (2020 17:30)  HR: 84 (10 Sd 2020 14:12) (80 - 97)  BP: 111/62 (10 Sd 2020 14:12) (111/62 - 136/88)  BP(mean): --  RR: 14 (10 Sd 2020 14:12) (14 - 18)  SpO2: 100% (10 Sd 2020 14:12) (97% - 100%)    ________________________________________________  PHYSICAL EXAM:  GENERAL: NAD. conversant. well groomed. comprehensive  HEENT: Normocephalic;  conjunctivae and sclerae clear; moist mucous membranes;   NECK : supple  CHEST/LUNG: Clear to auscultation bilaterally with fair air entry   HEART: S1 S2  regular; no murmurs, gallops or rubs  ABDOMEN: Soft, Nontender, Nondistended; Bowel sounds present  EXTREMITIES: no cyanosis; no edema; no calf tenderness  SKIN: warm and dry; no rash  NERVOUS SYSTEM:  Awake and alert; Oriented  to place, person and time ambulatory independently      _________________________________________________  LABS:                        10.2   26.14 )-----------( 87       ( 2020 06:12 )             33.2     06-    138  |  103  |  13  ----------------------------<  108<H>  3.5   |  26  |  0.81    Ca    8.9      2020 06:12  Phos  4.5     -  Mg     2.4         TPro  7.9  /  Alb  2.9<L>  /  TBili  0.9  /  DBili  x   /  AST  29  /  ALT  16  /  AlkPhos  37<L>  09    PT/INR - ( 2020 21:22 )   PT: 21.0 sec;   INR: 1.82 ratio         PTT - ( 2020 21:22 )  PTT:36.9 sec  Urinalysis Basic - ( 2020 14:32 )    Color: Yellow / Appearance: Clear / S.020 / pH: x  Gluc: x / Ketone: Trace  / Bili: Negative / Urobili: 8   Blood: x / Protein: 100 / Nitrite: Negative   Leuk Esterase: Trace / RBC: 2-5 /HPF / WBC 6-10 /HPF   Sq Epi: x / Non Sq Epi: Few /HPF / Bacteria: Trace /HPF      CAPILLARY BLOOD GLUCOSE      POCT Blood Glucose.: 215 mg/dL (10 Sd 2020 11:20)  POCT Blood Glucose.: 122 mg/dL (10 Sd 2020 07:49)  POCT Blood Glucose.: 130 mg/dL (2020 22:33)  POCT Blood Glucose.: 178 mg/dL (2020 17:36)        RADIOLOGY & ADDITIONAL TESTS:    Imaging  Reviewed:  YES  < from: CT Angio Chest w/ IV Cont (20 @ 01:06) >    EXAM:  CT ANGIO CHEST (W)AW IC                            PROCEDURE DATE:  2020          INTERPRETATION:  CLINICAL INFORMATION: Shortness of breath and leukocytosis    COMPARISON: CT chest 4/3/2019    PROCEDURE:   CT Angiography of the Chest.  60 ml of Omnipaque 350 was injected intravenously. 40 ml were discarded.  Sagittal and coronal reformats were performed as well as 3D (MIP) reconstructions.    FINDINGS:    LUNGS AND AIRWAYS: Patent central airways.  Bilateral lower lobe subsegmental atelectasis.  PLEURA: No pleural effusion.  MEDIASTINUM AND EILEEN: No lymphadenopathy.  VESSELS: Suboptimal opacification of the pulmonary arteries limits evaluation for pulmonary embolus. No large saddle embolus. Normal caliber main pulmonary artery. Aorta and great vessels are within normal limits..  HEART: Heart size is normal. Trace pericardial fluid.  CHEST WALL AND LOWER NECK: Within normal limits.  VISUALIZED UPPER ABDOMEN: Stable left adrenal nodule measures 1.8 cm..  BONES: Degenerative changes of the spine.    IMPRESSION:   Suboptimal exam for detection of pulmonary emboli due to poor opacification of the pulmonary arteries. No large saddle embolus.                  HAO MARCELINO M.D., ATTENDING RADIOLOGIST  This document has been electronically signed. 2020  1:26AM                < end of copied text >  < from: CT Head No Cont (20 @ 08:47) >    EXAM:  CT BRAIN                            PROCEDURE DATE:  2020          INTERPRETATION:      CT head without IV contrast        CLINICAL INFORMATION:  Altered mental status    TECHNIQUE: Contiguous axial 5 mm sections were obtained through the head. Sagittal and coronal 2-D reformatted images were also obtained.   This scan was performed using automatic exposure control (radiation dose reduction software) to obtain a diagnostic image quality scan with patient dose as low as reasonably achievable.     FINDINGS:   No previous examinations are available for review.    The brain demonstrates focal encephalomalacia and gliosis in the posterior RIGHT LEFT frontal lobe at the convexity, likely sequela of old infarction.   No acute cerebralcortical infarct is seen.  No intracranial hemorrhage is found.  No mass effect is found in the brain.      The ventricles, sulci and basal cisterns appear unremarkable.    The orbits are unremarkable.  The paranasal sinuses are clear.  The nasal cavity appears intact.  The nasopharynx is symmetric.  The central skull base, petrous temporal bones and calvarium remain intact.      IMPRESSION:   focal encephalomalacia and gliosis in the posterior RIGHT LEFT frontal lobe at the convexity, likely sequela of old infarction.                      IBIS BALTAZAR M.D., ATTENDING RADIOLOGIST  This document has been electronically signed. 2020  8:59AM                < end of copied text >      < from: Xray Chest 1 View AP/PA (20 @ 21:10) >    EXAM:  XR CHEST AP OR PA 1V                            PROCEDURE DATE:  2020          INTERPRETATION:  AP chest on 2020 at 8:57 PM. Patient is short of breath.    Heart is likely enlarged.    Small bandlike atelectases are increased from .    IMPRESSION: Increasing band like atelectases. Heart enlargement again noted.                CLAUDIA WHITE M.D., ATTENDING RADIOLOGIST  This document has been electronically signed. 2020  7:36AM                < end of copied text >        Consultant(s) Notes Reviewed:   YES  Heme/onc. Pulmonary    Plan of care was discussed with patient and /or primary care giver; all questions and concerns were addressed
NP Note discussed with  Primary Attending    Patient is a 75y old  Female who presents with a chief complaint of Shortness of breath (10 Sd 2020 13:26)    HPI- 76 yo female with PMH of leukemia (on chronic steroids), HTN, sinus tachycardia, presented with SOB for 1 day. Spoke to son over phone, who informed me that SOB was present all the time and that pt "didnot seem alright" and seemed confused. Denies any fever, cough, denies any sick contact. Pt mostly stays home. Pt reports feeling fine, denies any complains. denies any pain. As per son, recent known labs from 20 showed WBC 21.7. Her Oncologist:  Dr. Nae France. She has h/o sinus tachycardia. Cardiologist: Dr. Crump (Holter monitoring was recently done for tachycardia, Thursday, but no results given yet).    Pt is admitted for acute encephalopathy likely due to infection (PNA vs. UTI) and SOB.   Rocephine and Zithromax started in ED.  COVID test is negative. CTA chest shows  < from: CT Angio Chest w/ IV Cont (20 @ 01:06) > Suboptimal exam for detection of pulmonary emboli due to poor opacification of the pulmonary arteries. No large saddle embolus. CXR resulted atelectasis. Pulmonary consulted.   UA is positive. pending urine cx. Blood cx NGTD. Pt w/ leukocytosis likely due to leukemia and chronic steroid use. Heme/onc followed.   PT evaluated and recommended home PT.           GOC: Full code      INTERVAL HPI/OVERNIGHT EVENTS:  Pt is seen at bedside. alert and oriented x 3. denies any pain or discomfort. states she feels much better today.  Saturaing 97% on room air.  Afebrile.     MEDICATIONS  (STANDING):  amLODIPine   Tablet 10 milliGRAM(s) Oral daily  cefTRIAXone   IVPB 1000 milliGRAM(s) IV Intermittent every 24 hours  dextrose 5%. 1000 milliLiter(s) (50 mL/Hr) IV Continuous <Continuous>  dextrose 50% Injectable 12.5 Gram(s) IV Push once  dextrose 50% Injectable 25 Gram(s) IV Push once  dextrose 50% Injectable 25 Gram(s) IV Push once  enoxaparin Injectable 40 milliGRAM(s) SubCutaneous daily  ferrous    sulfate 325 milliGRAM(s) Oral daily  folic acid 1 milliGRAM(s) Oral daily  furosemide    Tablet 40 milliGRAM(s) Oral daily  gabapentin 300 milliGRAM(s) Oral two times a day  insulin lispro (HumaLOG) corrective regimen sliding scale   SubCutaneous three times a day before meals  melatonin 3 milliGRAM(s) Oral at bedtime  metoprolol succinate ER 50 milliGRAM(s) Oral daily  pantoprazole    Tablet 40 milliGRAM(s) Oral before breakfast  predniSONE   Tablet 80 milliGRAM(s) Oral daily    MEDICATIONS  (PRN):  acetaminophen   Tablet .. 650 milliGRAM(s) Oral every 6 hours PRN Temp greater or equal to 38C (100.4F), Mild Pain (1 - 3)  dextrose 40% Gel 15 Gram(s) Oral once PRN Blood Glucose LESS THAN 70 milliGRAM(s)/deciliter  glucagon  Injectable 1 milliGRAM(s) IntraMuscular once PRN Glucose LESS THAN 70 milligrams/deciliter      __________________________________________________  REVIEW OF SYSTEMS:    CONSTITUTIONAL: No fever,   EYES: no acute visual disturbances  NECK: No pain or stiffness  RESPIRATORY: No cough; No shortness of breath  CARDIOVASCULAR: No chest pain, no palpitations  GASTROINTESTINAL: No pain. No nausea or vomiting; No diarrhea   NEUROLOGICAL: No headache or numbness, no tremors  MUSCULOSKELETAL: No joint pain, no muscle pain  GENITOURINARY: no dysuria, no frequency, no hesitancy  PSYCHIATRY: no depression , no anxiety  ALL OTHER  ROS negative        Vital Signs Last 24 Hrs  T(C): 36.6 (10 Sd 2020 14:12), Max: 37.4 (2020 17:30)  T(F): 97.9 (10 Sd 2020 14:12), Max: 99.3 (2020 17:30)  HR: 84 (10 Sd 2020 14:12) (80 - 97)  BP: 111/62 (10 Sd 2020 14:12) (111/62 - 136/88)  BP(mean): --  RR: 14 (10 Sd 2020 14:12) (14 - 18)  SpO2: 100% (10 Sd 2020 14:12) (97% - 100%)    ________________________________________________  PHYSICAL EXAM:  GENERAL: NAD. conversant. well groomed. comprehensive  HEENT: Normocephalic;  conjunctivae and sclerae clear; moist mucous membranes;   NECK : supple  CHEST/LUNG: Clear to auscultation bilaterally with fair air entry   HEART: S1 S2  regular; no murmurs, gallops or rubs  ABDOMEN: Soft, Nontender, Nondistended; Bowel sounds present  EXTREMITIES: no cyanosis; no edema; no calf tenderness  SKIN: warm and dry; no rash  NERVOUS SYSTEM:  Awake and alert; Oriented  to place, person and time ambulatory independently      _________________________________________________  LABS:                        10.2   26.14 )-----------( 87       ( 2020 06:12 )             33.2     06-    138  |  103  |  13  ----------------------------<  108<H>  3.5   |  26  |  0.81    Ca    8.9      2020 06:12  Phos  4.5     -  Mg     2.4         TPro  7.9  /  Alb  2.9<L>  /  TBili  0.9  /  DBili  x   /  AST  29  /  ALT  16  /  AlkPhos  37<L>  09    PT/INR - ( 2020 21:22 )   PT: 21.0 sec;   INR: 1.82 ratio         PTT - ( 2020 21:22 )  PTT:36.9 sec  Urinalysis Basic - ( 2020 14:32 )    Color: Yellow / Appearance: Clear / S.020 / pH: x  Gluc: x / Ketone: Trace  / Bili: Negative / Urobili: 8   Blood: x / Protein: 100 / Nitrite: Negative   Leuk Esterase: Trace / RBC: 2-5 /HPF / WBC 6-10 /HPF   Sq Epi: x / Non Sq Epi: Few /HPF / Bacteria: Trace /HPF      CAPILLARY BLOOD GLUCOSE      POCT Blood Glucose.: 215 mg/dL (10 Sd 2020 11:20)  POCT Blood Glucose.: 122 mg/dL (10 Sd 2020 07:49)  POCT Blood Glucose.: 130 mg/dL (2020 22:33)  POCT Blood Glucose.: 178 mg/dL (2020 17:36)        RADIOLOGY & ADDITIONAL TESTS:    Imaging  Reviewed:  YES  < from: CT Angio Chest w/ IV Cont (20 @ 01:06) >    EXAM:  CT ANGIO CHEST (W)AW IC                            PROCEDURE DATE:  2020          INTERPRETATION:  CLINICAL INFORMATION: Shortness of breath and leukocytosis    COMPARISON: CT chest 4/3/2019    PROCEDURE:   CT Angiography of the Chest.  60 ml of Omnipaque 350 was injected intravenously. 40 ml were discarded.  Sagittal and coronal reformats were performed as well as 3D (MIP) reconstructions.    FINDINGS:    LUNGS AND AIRWAYS: Patent central airways.  Bilateral lower lobe subsegmental atelectasis.  PLEURA: No pleural effusion.  MEDIASTINUM AND EILEEN: No lymphadenopathy.  VESSELS: Suboptimal opacification of the pulmonary arteries limits evaluation for pulmonary embolus. No large saddle embolus. Normal caliber main pulmonary artery. Aorta and great vessels are within normal limits..  HEART: Heart size is normal. Trace pericardial fluid.  CHEST WALL AND LOWER NECK: Within normal limits.  VISUALIZED UPPER ABDOMEN: Stable left adrenal nodule measures 1.8 cm..  BONES: Degenerative changes of the spine.    IMPRESSION:   Suboptimal exam for detection of pulmonary emboli due to poor opacification of the pulmonary arteries. No large saddle embolus.                  HAO MARCELINO M.D., ATTENDING RADIOLOGIST  This document has been electronically signed. 2020  1:26AM                < end of copied text >  < from: CT Head No Cont (20 @ 08:47) >    EXAM:  CT BRAIN                            PROCEDURE DATE:  2020          INTERPRETATION:      CT head without IV contrast        CLINICAL INFORMATION:  Altered mental status    TECHNIQUE: Contiguous axial 5 mm sections were obtained through the head. Sagittal and coronal 2-D reformatted images were also obtained.   This scan was performed using automatic exposure control (radiation dose reduction software) to obtain a diagnostic image quality scan with patient dose as low as reasonably achievable.     FINDINGS:   No previous examinations are available for review.    The brain demonstrates focal encephalomalacia and gliosis in the posterior RIGHT LEFT frontal lobe at the convexity, likely sequela of old infarction.   No acute cerebralcortical infarct is seen.  No intracranial hemorrhage is found.  No mass effect is found in the brain.      The ventricles, sulci and basal cisterns appear unremarkable.    The orbits are unremarkable.  The paranasal sinuses are clear.  The nasal cavity appears intact.  The nasopharynx is symmetric.  The central skull base, petrous temporal bones and calvarium remain intact.      IMPRESSION:   focal encephalomalacia and gliosis in the posterior RIGHT LEFT frontal lobe at the convexity, likely sequela of old infarction.            IBIS BALTAZAR M.D., ATTENDING RADIOLOGIST  This document has been electronically signed. 2020  8:59AM                < end of copied text >      < from: Xray Chest 1 View AP/PA (20 @ 21:10) >    EXAM:  XR CHEST AP OR PA 1V                            PROCEDURE DATE:  2020          INTERPRETATION:  AP chest on 2020 at 8:57 PM. Patient is short of breath.    Heart is likely enlarged.    Small bandlike atelectases are increased from .    IMPRESSION: Increasing band like atelectases. Heart enlargement again noted.                CLAUDIA WHITE M.D., ATTENDING RADIOLOGIST  This document has been electronically signed. 2020  7:36AM                < end of copied text >        Consultant(s) Notes Reviewed:   YES  Heme/onc. Pulmonary    Plan of care was discussed with patient and /or primary care giver; all questions and concerns were addressed
· Subjective and Objective: 	   History of Present Illness:  Reason for Admission: Shortness of breath	  History of Present Illness: 	  76 yo female with PMH of leukemia (on chronic steroids), HTN, sinus tachycardia, presented with SOB for 1 day. Spoke to son over phone, who informed me that SOB was present all the time and that pt "didnot seem alright" and seemed confused. Denies any fever, cough, denies any sick contact. Pt mostly stays home. Pt reports feeling fine, denies any complains. denies any pain. As per son, recent known labs from 5/28/20 showed WBC 21.7. Her Oncologist:  Dr. Nae France. She has h/o sinus tachycardia. Cardiologist: Dr. Crump (Holter monitoring was recently done for tachycardia, Thursday, but no results given yet).    [facility]:      OncHx: I personally spoke with Dr. France and pt has MDS and Autoimmune Hemolytic Anemia. She has been receiving IVIG q 3 weeks and is taking daily prednisone 5mg. Denies weight loss, bleeding, chronic infections, fever       Review of Systems:  Other Review of Systems: All other review of systems negative, except as noted in HPI 	      Allergies and Intolerances:        Allergies:  	No Known Allergies:     Home Medications:   * Patient Currently Takes Medications as of 09-Jun-2020 06:22 documented in Structured Notes  · 	amLODIPine 10 mg oral tablet: 1 tab(s) orally once a day  · 	predniSONE 20 mg oral tablet: 4 tab(s) orally once a day  · 	ferrous sulfate 325 mg (65 mg elemental iron) oral tablet: 1 tab(s) orally once a day  · 	folic acid 1 mg oral tablet: 1 tab(s) orally once a day  · 	furosemide 40 mg oral tablet: 1 tab(s) orally once a day  · 	gabapentin 300 mg oral capsule: 1 cap(s) orally 2 times a day  · 	Metoprolol Succinate ER 50 mg oral tablet, extended release: 1 tab(s) orally once a day  · 	prochlorperazine 10 mg oral tablet: 1 tab(s) orally 3 times a day, As Needed    Patient History:    Past Medical, Past Surgical, and Family History:  PAST MEDICAL HISTORY:  Anemia , Essential hypertension, Fibroids, H/O rheumatic heart disease, Leukemia.     PAST SURGICAL HISTORY:  H/O abdominal hysterectomy.     FAMILY HISTORY:  FH: breast cancer, sister.     Social History:  Social History (marital status, living situation, occupation, tobacco use, alcohol and drug use, and sexual history): from home   self ambulates   lives with son  denies alcohol, denies smoking	     Tobacco Screening:  · Core Measure Site	Yes	  · Has the patient used tobacco in the past 30 days?	No	    Risk Assessment:    Present on Admission:  Deep Venous Thrombosis	no	  Pulmonary Embolus	suspected	     Heart Failure:  Does this patient have a history of or has been diagnosed with heart failure? unknown.    Vitals:  Vital Signs Last 24 Hrs  T(C): 36.6 (10 Sd 2020 14:12), Max: 37.4 (09 Jun 2020 17:30)  T(F): 97.9 (10 Sd 2020 14:12), Max: 99.3 (09 Jun 2020 17:30)  HR: 84 (10 Sd 2020 14:12) (80 - 97)  BP: 111/62 (10 Sd 2020 14:12) (111/62 - 136/88)  BP(mean): --  ABP: --  ABP(mean): --  RR: 14 (10 Sd 2020 14:12) (14 - 18)  SpO2: 100% (10 Sd 2020 14:12) (97% - 100%)        Physical Exam:     	GENERAL: NAD, AAOx3  	HEENT: Normocephalic;  conjunctivae and sclerae clear; moist mucous membranes;   	NECK : supple  	CHEST/LUNG: Clear to auscultation bilaterally with good air entry   	HEART: S1 S2  regular; no murmurs, gallops or rubs  	ABDOMEN: Soft, Nontender, Nondistended; BS+, no hepatosplenomegaly  	EXTREMITIES: no cyanosis; no edema; no calf tenderness  	SKIN: warm and dry; no rash  NERVOUS SYSTEM:  Awake and alert; Oriented  to place, person and time ; no new deficit        Laboratory:       CBC Full  -  ( 09 Jun 2020 06:12 )  WBC Count : 26.14 K/uL  RBC Count : 3.90 M/uL  Hemoglobin : 10.2 g/dL  Hematocrit : 33.2 %  Platelet Count - Automated : 87 K/uL  Mean Cell Volume : 85.1 fl  Mean Cell Hemoglobin : 26.2 pg  Mean Cell Hemoglobin Concentration : 30.7 gm/dL  Auto Neutrophil # : SEE NOTE K/uL  Auto Lymphocyte # : SEE NOTE K/uL  Auto Monocyte # : SEE NOTE K/uL  Auto Eosinophil # : SEE NOTE K/uL  Auto Basophil # : SEE NOTE K/uL  Auto Neutrophil % : SEE NOTE %  Auto Lymphocyte % : SEE NOTE %  Auto Monocyte % : SEE NOTE %  Auto Eosinophil % : SEE NOTE %  Auto Basophil % : SEE NOTE %    06-10    138  |  106  |  21<H>  ----------------------------<  172<H>  3.9   |  25  |  0.90    Ca    9.4      10 Sd 2020 16:00  Phos  4.5     06-09  Mg     2.4     06-09    TPro  8.4<H>  /  Alb  3.1<L>  /  TBili  0.6  /  DBili  x   /  AST  31  /  ALT  22  /  AlkPhos  41  06-10          Radiology:       < from: CT Head No Cont (06.09.20 @ 08:47) >  XAM:  CT BRAIN                            PROCEDURE DATE:  06/09/2020          INTERPRETATION:      CT head without IV contrast        CLINICAL INFORMATION:  Altered mental status    TECHNIQUE: Contiguous axial 5 mm sections were obtained through the head. Sagittal and coronal 2-D reformatted images were also obtained.   This scan was performed using automatic exposure control (radiation dose reduction software) to obtain a diagnostic image quality scan with patient dose as low as reasonably achievable.     FINDINGS:   No previous examinations are available for review.    The brain demonstrates focal encephalomalacia and gliosis in the posterior RIGHT LEFT frontal lobe at the convexity, likely sequela of old infarction.   No acute cerebralcortical infarct is seen.  No intracranial hemorrhage is found.  No mass effect is found in the brain.      The ventricles, sulci and basal cisterns appear unremarkable.    The orbits are unremarkable.  The paranasal sinuses are clear.  The nasal cavity appears intact.  The nasopharynx is symmetric.  The central skull base, petrous temporal bones and calvarium remain intact.      IMPRESSION:   focal encephalomalacia and gliosis in the posterior RIGHT LEFT frontal lobe at the convexity, likely sequela of old infarction.                < end of copied text >    CT:	    09-Jun-2020 01:06, CT Angio Chest w/ IV Cont	  CT Angio Chest w/ IV Cont: EXAM:  CT ANGIO CHEST (W)AW IC                            PROCEDURE DATE:  06/09/2020          INTERPRETATION:  CLINICAL INFORMATION: Shortness of breath and leukocytosis    COMPARISON: CT chest 4/3/2019    PROCEDURE:   CT Angiography of the Chest.  60 ml of Omnipaque 350 was injected intravenously. 40 ml were discarded.  Sagittal and coronal reformats were performed as well as 3D (MIP) reconstructions.    FINDINGS:    LUNGS AND AIRWAYS: Patent central airways.  Bilateral lower lobe subsegmental atelectasis.  PLEURA: No pleural effusion.  MEDIASTINUM AND EILEEN: No lymphadenopathy.  VESSELS: Suboptimal opacification of the pulmonary arteries limits evaluation for pulmonary embolus. No large saddle embolus. Normal caliber main pulmonary artery. Aorta and great vessels are within normal limits..  HEART: Heart size is normal. Trace pericardial fluid.  CHEST WALL AND LOWER NECK: Within normal limits.  VISUALIZED UPPER ABDOMEN: Stable left adrenal nodule measures 1.8 cm..  BONES: Degenerative changes of the spine.    IMPRESSION:   Suboptimal exam for detection of pulmonary emboli due to poor opacification of the pulmonary arteries. No large saddle embolus.   HAO MARCELINO M.D., ATTENDING RADIOLOGIST  This document has been electronically signed. Jun 9 2020  1:26AM	  X-Ray, Fluoroscopy:	  PACS Image: Image(s) Available< from: Xray Chest 1 View AP/PA (06.08.20 @ 21:10) > EXAM:  XR CHEST AP OR PA 1V                         PROCEDURE DATE:  06/08/2020      INTERPRETATION:  AP chest on June 8, 2020 at 8:57 PM. Patient is short of breath.  Heart is likely enlarged.  Small bandlike atelectases are increased from January 4.  IMPRESSION: Increasing band like atelectases. Heart enlargement again noted.  CLAUDIA WHITE M.D., ATTENDING RADIOLOGIST This document has been electronically signed. Jun 9 2020  7:36AM     < end of copied text >

## 2020-06-11 NOTE — PROGRESS NOTE ADULT - PROBLEM SELECTOR PLAN 3
UA +  Urine cx pending result  Complete a 10-day course with oral cephalosporin  change to po antibiotic depending on sensitivity

## 2020-06-11 NOTE — DISCHARGE NOTE NURSING/CASE MANAGEMENT/SOCIAL WORK - PATIENT PORTAL LINK FT
You can access the FollowMyHealth Patient Portal offered by [facility] by registering at the following website: [email or web address]. By joining [employer]’s FollowMyHealth portal, you will also be able to view your health information using other applications (apps) compatible with our system.

## 2020-06-11 NOTE — PROGRESS NOTE ADULT - ATTENDING COMMENTS
I have examined the patient at bedside and reviewed patient's data and participated in the management of the patient along with Telma RAMSAY as well as hematology/med oncology faculty during the daily heme/onc case review. I reviewed pertinent clinical information, PE,  labs as well as A/P as outline above and in agreement.
I have examined the patient at bedside and reviewed patient's data and participated in the management of the patient along with Telma RAMSAY as well as hematology/med oncology faculty during the daily heme/onc case review. I reviewed pertinent clinical information, PE,  labs as well as A/P as outline above and in agreement.
Patient was examined and discussed with NP.    She is alert and feels better.     ICU Vital Signs Last 24 Hrs  T(C): 36.7 (11 Jun 2020 11:51), Max: 36.7 (10 Sd 2020 20:00)  T(F): 98 (11 Jun 2020 11:51), Max: 98.1 (10 Sd 2020 20:00)  HR: 66 (11 Jun 2020 11:51) (66 - 80)  BP: 120/75 (11 Jun 2020 11:51) (120/75 - 132/72)  BP(mean): --  ABP: --  ABP(mean): --  RR: 14 (11 Jun 2020 11:51) (14 - 16)  SpO2: 100% (11 Jun 2020 11:51) (100% - 100%)    Lungs, clear  Cor, RRR  Abdomen, soft  Neurological, intact                                   9.7    26.19 )-----------( 102      ( 11 Jun 2020 06:59 )             31.1   06-11    143  |  105  |  25<H>  ----------------------------<  83  3.7   |  24  |  0.81    Ca    9.7      11 Jun 2020 06:59    TPro  8.4<H>  /  Alb  3.1<L>  /  TBili  0.6  /  DBili  x   /  AST  31  /  ALT  22  /  AlkPhos  41  06-10    Urinalysis + Microscopic Examination (06.09.20 @ 14:32)    Glucose Qualitative, Urine: Negative    Blood, Urine: Large    Urine Appearance: Clear    Bilirubin: Negative    Color: Yellow    Urobilinogen: 8    Specific Gravity: 1.020    Protein, Urine: 100    pH Urine: 6.0    Leukocyte Esterase Concentration: Trace    Nitrite: Negative    Ketone - Urine: Trace    Red Blood Cell - Urine: 2-5 /HPF    White Blood Cell - Urine: 6-10 /HPF    Epithelial Cells: Few /HPF    Bacteria: Trace /HPF  Specimen Source: .Blood Blood-Peripheral (06.09.20 @ 03:00)    IMP:  Encephalopathy, resolved, no evidence of COVID infection          CLL with auto-immune hemolytic anemia, stable on IVIG q 3 weeks          encephalopathy, probably from acute sepsis, resolved.          sepsis, likely secondary to UTI, improved          No evidence of pulmonary embolism or pneumonia          Thrombocytopenia, improving  Plan: discharge home on oral antibiotics to complete 14 days.          Pulmonary consultation, seen and appreciated.  f/u Pulmonary          Discharge home.  f/u PMD and Dr. France
Patient was examined and discussed with VASILIY Littlejohn and with her outpatient hematologist.     She is alert and feels better.     Vital Signs Last 24 Hrs  T(C): 36.6 (10 Sd 2020 14:12), Max: 37.4 (09 Jun 2020 17:30)  T(F): 97.9 (10 Sd 2020 14:12), Max: 99.3 (09 Jun 2020 17:30)  HR: 84 (10 Sd 2020 14:12) (80 - 97)  BP: 111/62 (10 Sd 2020 14:12) (111/62 - 136/88)  BP(mean): --  RR: 14 (10 Sd 2020 14:12) (14 - 18)  SpO2: 100% (10 Sd 2020 14:12) (97% - 100%)  Lungs, clear  Cor, RRR  Abdomen, soft  Neurological, intact                        10.2   26.14 )-----------( 87       ( 09 Jun 2020 06:12 )             33.2   06-09    138  |  103  |  13  ----------------------------<  108<H>  3.5   |  26  |  0.81    Ca    8.9      09 Jun 2020 06:12  Phos  4.5     06-09  Mg     2.4     06-09    TPro  7.9  /  Alb  2.9<L>  /  TBili  0.9  /  DBili  x   /  AST  29  /  ALT  16  /  AlkPhos  37<L>  06-09    Urinalysis + Microscopic Examination (06.09.20 @ 14:32)    Glucose Qualitative, Urine: Negative    Blood, Urine: Large    Urine Appearance: Clear    Bilirubin: Negative    Color: Yellow    Urobilinogen: 8    Specific Gravity: 1.020    Protein, Urine: 100    pH Urine: 6.0    Leukocyte Esterase Concentration: Trace    Nitrite: Negative    Ketone - Urine: Trace    Red Blood Cell - Urine: 2-5 /HPF    White Blood Cell - Urine: 6-10 /HPF    Epithelial Cells: Few /HPF    Bacteria: Trace /HPF  Specimen Source: .Blood Blood-Peripheral (06.09.20 @ 03:00)    IMP:  Encephalopathy, resolved, no evidence of COVID infection          CLL with auto-immune hemolytic anemia, stable on IVIG q 3 weeks          encephalopathy, probably from acute sepsis, resolved.          sepsis, likely secondary to UTI, improved          No evidence of pulmonary embolism or pneumonia  Plan: f/u urine culture          discharge home on oral antibiotics to complete 14 days.          Pulmonary consultation, seen and appreciated.  f/u Pulmonary          incentive spirometry

## 2020-06-11 NOTE — PROGRESS NOTE ADULT - PROBLEM SELECTOR PLAN 1
likely toxic due to PNA vs. UTI  CT chest and CXR as above.  COVID is negative  mental status returned at baseline AOx3  p/w fever but afebrile > 24hrs. leukocytosis due to steroid, hx leukemia  UA +. pending urine culture  blood cx NGTD  started Zithromax and Rocephin in ED.   Resolved

## 2020-06-11 NOTE — PROGRESS NOTE ADULT - ASSESSMENT
Assessment and Plan:      · Assessment		  74 yo female with PMH of leukemia (on chronic steroids), HTN, sinus tachycardia, presented with SOB for 1 day and AMS. Admitted for acute encephalopathy and SOB          Problem# MDS  p/w SOB and AMS  OP oncologist Rohit France confirmed pt is on tx with IVIG q 3 weeks and daily pred 5mg  CT angio- suboptimal opacification of pulm arteries, no saddle embolus  Head CT- old infarct      Problem#  Anemia  Hb: 9.7 (pts baseline 10's as per Oncologist)  multifactorial- MDS/PNA/sepsis  Iron sat 16%  TSH WNL  B12/folate normal  Cr WNL 0.8    Problem# Thrombocytopenia   Stable at 87, this is pt's baseline from prior admissions; however Dr. France states last Syk=817r  No spontaneous bleeding / bruising.   -CTM.   will check LDH/retic/haptoglobin, results pending  -CBC daily  -1U SDP Tx for Plts < 10K     Problem# VTE Prophylaxis    on Lovenox 40mg      Thank you for the referral. Will continue to monitor the patient.  Any questions please call [phone] Assessment and Plan:      · Assessment		  74 yo female with PMH of leukemia (on chronic steroids), HTN, sinus tachycardia, presented with SOB for 1 day and AMS. Admitted for acute encephalopathy and SOB          Problem# MDS  p/w SOB and AMS  OP oncologist Rohit France confirmed pt is on tx with IVIG q 3 weeks and daily pred 5mg  CT angio- suboptimal opacification of pulm arteries, no saddle embolus  Head CT- old infarct      Problem#  Anemia  Hb: 9.7 (pts baseline 10's as per Oncologist)  multifactorial- MDS/PNA/sepsis  Iron sat 16%  TSH WNL  B12/folate normal  Cr WNL 0.8  Nouptvzysgp=365, LLL=665, Retic 4.4    Problem# Thrombocytopenia   Stable at 87, this is pt's baseline from prior admissions; however Dr. France states last Cis=333g  No spontaneous bleeding / bruising.   -CTM.   -CBC daily  -1U SDP Tx for Plts < 10K     Problem# VTE Prophylaxis    on Lovenox 40mg    planned for discharge this PM    Upon discharge pt should f/u with her Oncologist Dr. France  Thank you for the referral. Will continue to monitor the patient.  Any questions please call [phone]

## 2020-06-11 NOTE — PROGRESS NOTE ADULT - PROBLEM SELECTOR PLAN 2
CTA shows Suboptimal exam for detection of pulmonary emboli due to poor opacification of the pulmonary arteries. No large saddle embolus.   CXR resulted atelectasis. Pulmonary consulted.   Pulmonary dr. Holman consulted- r/o Pul. embolism and PNA  d/c Zithromax

## 2021-01-18 NOTE — ED PROVIDER NOTE - DR. NAME
Patient Gracie SHETTY Dallas is requesting a refill on: alprazolam  Pt last seen: 11/05/2020  Last Refill: 12/22/2020  Upcoming Appt.: no      Please advise.     
Ambar Lal)

## 2021-03-13 NOTE — ED ADULT NURSE NOTE - PAIN RATING/NUMBER SCALE (0-10): REST
0
PAST SURGICAL HISTORY:  A-V fistula left arm 5/2017    H/O angioplasty 2013,  no  intervention    H/O carotid endarterectomy Right    H/O circumcision at  age  65    H/O left knee surgery     History of loop recorder     S/P TAVR (transcatheter aortic valve replacement)

## 2021-04-15 PROCEDURE — 86803 HEPATITIS C AB TEST: CPT

## 2021-04-15 PROCEDURE — 85652 RBC SED RATE AUTOMATED: CPT

## 2021-04-15 PROCEDURE — 83880 ASSAY OF NATRIURETIC PEPTIDE: CPT

## 2021-04-15 PROCEDURE — 87521 HEPATITIS C PROBE&RVRS TRNSC: CPT

## 2021-04-15 PROCEDURE — 88305 TISSUE EXAM BY PATHOLOGIST: CPT

## 2021-04-15 PROCEDURE — 85045 AUTOMATED RETICULOCYTE COUNT: CPT

## 2021-04-15 PROCEDURE — 96374 THER/PROPH/DIAG INJ IV PUSH: CPT

## 2021-04-15 PROCEDURE — 88341 IMHCHEM/IMCYTCHM EA ADD ANTB: CPT

## 2021-04-15 PROCEDURE — 93005 ELECTROCARDIOGRAM TRACING: CPT

## 2021-04-15 PROCEDURE — 88342 IMHCHEM/IMCYTCHM 1ST ANTB: CPT

## 2021-04-15 PROCEDURE — 86353 LYMPHOCYTE TRANSFORMATION: CPT

## 2021-04-15 PROCEDURE — 82553 CREATINE MB FRACTION: CPT

## 2021-04-15 PROCEDURE — 38221 DX BONE MARROW BIOPSIES: CPT

## 2021-04-15 PROCEDURE — 83550 IRON BINDING TEST: CPT

## 2021-04-15 PROCEDURE — 83605 ASSAY OF LACTIC ACID: CPT

## 2021-04-15 PROCEDURE — 87205 SMEAR GRAM STAIN: CPT

## 2021-04-15 PROCEDURE — 86900 BLOOD TYPING SEROLOGIC ABO: CPT

## 2021-04-15 PROCEDURE — 83735 ASSAY OF MAGNESIUM: CPT

## 2021-04-15 PROCEDURE — 88185 FLOWCYTOMETRY/TC ADD-ON: CPT

## 2021-04-15 PROCEDURE — 96375 TX/PRO/DX INJ NEW DRUG ADDON: CPT

## 2021-04-15 PROCEDURE — 88360 TUMOR IMMUNOHISTOCHEM/MANUAL: CPT

## 2021-04-15 PROCEDURE — 84484 ASSAY OF TROPONIN QUANT: CPT

## 2021-04-15 PROCEDURE — 88237 TISSUE CULTURE BONE MARROW: CPT

## 2021-04-15 PROCEDURE — 83615 LACTATE (LD) (LDH) ENZYME: CPT

## 2021-04-15 PROCEDURE — 81001 URINALYSIS AUTO W/SCOPE: CPT

## 2021-04-15 PROCEDURE — 83540 ASSAY OF IRON: CPT

## 2021-04-15 PROCEDURE — 86334 IMMUNOFIX E-PHORESIS SERUM: CPT

## 2021-04-15 PROCEDURE — G0378: CPT

## 2021-04-15 PROCEDURE — 87177 OVA AND PARASITES SMEARS: CPT

## 2021-04-15 PROCEDURE — 88313 SPECIAL STAINS GROUP 2: CPT

## 2021-04-15 PROCEDURE — 85097 BONE MARROW INTERPRETATION: CPT

## 2021-04-15 PROCEDURE — 88184 FLOWCYTOMETRY/ TC 1 MARKER: CPT

## 2021-04-15 PROCEDURE — 83010 ASSAY OF HAPTOGLOBIN QUANT: CPT

## 2021-04-15 PROCEDURE — 86923 COMPATIBILITY TEST ELECTRIC: CPT

## 2021-04-15 PROCEDURE — 82550 ASSAY OF CK (CPK): CPT

## 2021-04-15 PROCEDURE — 36430 TRANSFUSION BLD/BLD COMPNT: CPT

## 2021-04-15 PROCEDURE — 80053 COMPREHEN METABOLIC PANEL: CPT

## 2021-04-15 PROCEDURE — 82728 ASSAY OF FERRITIN: CPT

## 2021-04-15 PROCEDURE — 86850 RBC ANTIBODY SCREEN: CPT

## 2021-04-15 PROCEDURE — 99285 EMERGENCY DEPT VISIT HI MDM: CPT | Mod: 25

## 2021-04-15 PROCEDURE — 72070 X-RAY EXAM THORAC SPINE 2VWS: CPT

## 2021-04-15 PROCEDURE — 84100 ASSAY OF PHOSPHORUS: CPT

## 2021-04-15 PROCEDURE — 88280 CHROMOSOME KARYOTYPE STUDY: CPT

## 2021-04-15 PROCEDURE — P9040: CPT

## 2021-04-15 PROCEDURE — 36415 COLL VENOUS BLD VENIPUNCTURE: CPT

## 2021-04-15 PROCEDURE — 86901 BLOOD TYPING SEROLOGIC RH(D): CPT

## 2021-04-15 PROCEDURE — 80048 BASIC METABOLIC PNL TOTAL CA: CPT

## 2021-04-15 PROCEDURE — 88264 CHROMOSOME ANALYSIS 20-25: CPT

## 2021-04-15 PROCEDURE — 82272 OCCULT BLD FECES 1-3 TESTS: CPT

## 2021-04-15 PROCEDURE — 85027 COMPLETE CBC AUTOMATED: CPT

## 2021-04-15 PROCEDURE — 85610 PROTHROMBIN TIME: CPT

## 2021-04-15 PROCEDURE — 84466 ASSAY OF TRANSFERRIN: CPT

## 2021-04-15 PROCEDURE — 71045 X-RAY EXAM CHEST 1 VIEW: CPT

## 2021-12-13 NOTE — ED ADULT NURSE NOTE - NSFALLRSKASSESASSIST_ED_ALL_ED
[facility] Wound Ostomy Continence Nursing  Follow Up      NAME:  Franklin Allred RECORD NUMBER:  [id number]  AGE: 79 y.o. GENDER: female  : 1951  TODAY'S DATE:  2021      Wound vac dressing remains intact and appears to be functioning properly. 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 Surgery resident at bedside made aware. New pouch applied to site with saline moistened gauze loosely packed into the wound on proximal/medial edge of the stoma where separation from original surgical incision exists. Will cont to follow closely for wound/ostomy needs.       Asher Perry MSN RN, CWS, [facility] [place] [address]  Wound, Ostomy, and Continence Nursing  [phone] no

## 2022-02-16 NOTE — ED PROVIDER NOTE - CROS ED CONS ALL NEG
---------------------  From: Farzana Mccabe LPN (Phone Messages Pool ([address] [place] [address]))   To: [facility] Message Pool ([address] [place] [address]);     Sent: 12/4/2019 8:46:13 AM CST  Subject: tizanidine refill     Phone Message    PCP:   SYDNI      Time of Call:  8:03am       Person Calling:  pt  Phone number:  [phone]    Note:   Pt LM requesting a refill of tizanidine. Pt says she is in [place] again and asks that her Rx be sent there. There is a [facility] in [place] on pharmacy list.    Last prescribed 11/14/19 tizanidine 4mg 1 tab PO q8hrs PRN for back pain #60    Last office visit and reason:  10/8/19 Bipolar---------------------  From: Missy Tran CMA ([facility] Message Pool ([address] [place] [address]))   To: Marco A Craig MD;     Sent: 12/4/2019 9:02:26 AM CST  Subject: FW: tizanidine refill---------------------  From: Missy Tran CMA ([facility] Message Pool ([address] [place] [address]))   To: Marco A Craig MD;     Sent: 12/4/2019 9:05:05 AM CST  Subject: FW: tizanidine refill---------------------  From: Marco A Craig MD   To: [facility] Message Pool ([facility]);     Sent: 12/4/2019 9:36:30 AM CST  Subject: RE: tizanidine refill     okPt called and informed at 10:22am that this was sent to pharmacy in [place]   - - -

## 2022-03-22 NOTE — H&P ADULT - I WAS PHYSICALLY PRESENT FOR THE KEY PORTIONS OF THE EVALUATION AND MANAGEMENT (E/M) SERVICE PROVIDED.  I AGREE WITH THE ABOVE HISTORY, PHYSICAL, AND PLAN WHICH I HAVE REVIEWED AND EDITED WHERE APPROPRIATE
GROUP THERAPY PROGRESS NOTE    Patient is participating in Leisure Education group. Group time: 45 minutes    Personal goal for participation: To develop an understanding and practice of stress relief through art therapy    Goal orientation: Personal    Group therapy participation: Active     Therapeutic interventions reviewed and discussed:  Group members were given the opportunity to engage in conversation about their mental health challenges and strengths while coloring. Group members gained an understanding of how stress relief through artistic practice can be beneficial to their mental health. Impression of participation: Pt was present and engaged in group activity and discussion. Pt added to group topic of self-care while completing painting activity.        ADELAIDA Parekh, QMHP-A Statement Selected

## 2022-07-05 NOTE — ED PROVIDER NOTE - CPE EDP EYES NORM
1. Alcohol Use Disorder only- AUDIT Score: 10    2. Blood Alcohol Level in Emergency Room: 220  According to the National [place] on Alcohol Abuse and Alcoholism (NIAAA) the following are the recommended Maximum Drinking Limits. People are advised to stay within these limits:   For healthy men up to 65   * No more than 4 drinks in a day AND no more than 14 drinks in a week   For healthy women (and healthy men over age 65)   * No more than 3 drinks in a day and no more than 7 drinks in a week     3. Discussion of patient results on AUDIT/BAL/Substance Abuse Assessment compared to national norms: yes    4. According to the National [place] on Alcohol Abuse and Alcoholism (NIAAA) the following are negative consequences of Alcohol Use.   Brain: (includes emotional effects) Alcohol interferes with the brain's communication pathways, and can affect the way the brain looks and works. These disruptions can change mood, emotions, behavior, and make it harder to think clearly, react appropriately, and move with coordination.     Heart: Drinking a lot over a long time or too much on a single occasion can damage the heart, causing problems including:   * Cardiomyopathy - Stretching and drooping of heart muscle   * Arrhythmias - Irregular heart beat   * Stroke   * High blood pressure Research also shows that drinking moderate amounts of alcohol may protect healthy adults from developing coronary heart disease.   Liver: Heavy drinking takes a toll on the liver, and can lead to a variety of problems and liver inflammations including:   * Steatosis, or fatty liver   * Alcoholic hepatitis   * Fibrosis   * Cirrhosis   Pancreas: Alcohol causes the pancreas to produce toxic substances that can eventually lead to pancreatitis, a dangerous inflammation and swelling of the blood vessels in the pancreas that prevents proper digestion.   Cancer: Drinking too much alcohol can increase your risk of developing certain cancers, including  cancers of the:   * Mouth   * Esophagus   * Throat   * Liver   * Breast   Immune System: Drinking too much can weaken your immune system, making your body a much easier target for disease. Chronic drinkers are more liable to contract diseases like pneumonia and tuberculosis than people who do not drink too much. Drinking a lot on a single occasion slows your body's ability to byrd off infections - even up to 24 hours after getting drunk.     5. Occupational consequences: According to the National [place] on Alcohol and Drug Dependence, the impact of alcoholism and drug dependence in the workplace often focuses on four major issues:   * Premature death/fatal accidents   * Injuries/accident rates   * Absenteeism/extra sick leave   * Loss of production     6. Discussion about the following points about negative consequences took place: 7/5/22    7. Discussion regarding severity of problem: yes    8. The following therapeutic interventions have been recommended: see avs    9. Patient decision regarding alcohol use and treatment: see avs     normal...

## 2022-08-05 NOTE — ED PROVIDER NOTE - TEMPLATE
Abdominal Pain, N/V/D Shiva Wade (DO)  [facility]  [address] [place] [address]  Phone: [phone]  Fax: [phone]  Follow Up Time:

## 2022-12-12 NOTE — DISCHARGE NOTE NURSING/CASE MANAGEMENT/SOCIAL WORK - NSPROEXTENSIONSOFSELF_GEN_A_NUR
eyeglasses/dentures Valtrex Counseling: I discussed with the patient the risks of valacyclovir including but not limited to kidney damage, nausea, vomiting and severe allergy.  The patient understands that if the infection seems to be worsening or is not improving, they are to call.

## 2022-12-16 NOTE — ED PROVIDER NOTE - LOCATION
Drink plenty of fluids. Follow-up with your primary physician as discussed. Continue medications as previously prescribed. Return to the emergency department if increased weakness, chest pain, difficulty breathing, or other new symptoms develop.
respiratory, alteration in breathing

## 2023-02-01 NOTE — ED ADULT TRIAGE NOTE - BMI (KG/M2)
CARDIOLOGY PROGRESS NOTE       SUBJECTIVE   Dolly Schwarz is a 51 year old female who has a history of paroxysmal atrial fibrillation and chest discomfort who presents for follow up. The patient have to undergo surgery on 05/03/2023 for manometry anorectal and 2/17/2023 for left atrial appendage occlusion-cv. Since the last visit on  7/27/2022, the patient states that she has been having occasional episodes chest pains this can occur at any time.  There is no associated shortness of breath.  The patient states that she is having issues with her landlord. The patient states that her chest pain is stable, and that is sometimes happens everyday. The patient states that sometimes it lasts seconds, and on rare occassions minutes. The patient denies any palpitations. The patient denies any lightheadedness or dizziness. There is no orthopnea or PND. No other complaints at this time.    The patient was admitted to the hospital from 07/11-07/13/2022 for paroxysmal atrial fibrillation, SOB, sepsis without acute organ dysfunction, pneumonia due to infectious organism, viral pneumonia, tachycardia, and exposure to 2019 novel coronavirus. The patient reports that when she went to [place] to drop off a friend at the hospital, her heart rate was going up and done. The patient reports that she is now on eliquis. The patient reports that she is not aware if her eliquis is affordable for her. The patient reports that she still feels her heart racing.   She was experiencing shortness of breath and last Friday was taken to [facility].  The patient was diagnosed with a COPD exacerbation.  Patient denies any discomfort in her chest.  She states that she was started on a new inhaler last week.     Echo Juve 10/20/2022:  Final Impressions  No apparent left atrial or left atrial appendage thrombus.  Maximal ostial length KRUPA = 1.9 cm.  Normal left ventricular chamber size and systolic function.    Holter Monitor 08/09/2022:  CONCLUSION:   The  patient has one 6-beat run of supraventricular tachycardia but is otherwise normal.     Echocardiogram 10/26/2021: LVEF 56%  Normal left ventricular cavity size.   Mildly increased left ventricular wall thickness.   Normal left ventricular systolic function.   Left ventricular ejection fraction, 56 %.   Normal diastolic function.  Mild tricuspid valve regurgitation.    Stress Test 08/31/2020:  This stress test is within normal limits and is negative for ischemia.  At the time of this dictation, the Cardiolite portion is still pending.  Correlation with nuclear scan is recommended.   Nuclear Portion:  1.  No evidence of Regadenoson induced ischemia.  2.  No evidence of previous myocardial injury pattern.  3.  Normal left ventricular size with satisfactory resting systolic performance.     US A/B Index 02/19/2020:  1. Resting ankle-brachial indices of 1.06 on the right and 1.16 on the left. Which are within normal limits.  2. Toe brachial indices of 0.8 on the right and 0.86 on the left.    Chest CT 11/06/2019:  IMPRESSION:  1. Subjective minimal progression of the pulmonary abnormal ground glass opacities. Persistent bilateral apical cystic changes.  2. Findings can be seen with DIP. Correlation is recommended with history of smoking and pulmonary biopsy results, if any.  3. Smoking cessation is highly advised.    Holter Monitor 2/11/2019:  CONCLUSION:  This is an unremarkable 48-hour Holter monitor.    Stress Test 2/11/2019:  CONCLUSION:  This stress test is within normal limits and is negative for ischemia.  At the time of this dictation, the Cardiolite portion is still pending. Correlation with nuclear scan is recommended.  Nuclear Portion:   IMPRESSION:  1.  No evidence of Regadenoson induced ischemia.  2.  No evidence of previous myocardial injury pattern.  3.  Normal left ventricular size with satisfactory resting systolic performance.     Echo M-Mode/2D 2/11/2019:  FINAL IMPRESSION:  Mild tricuspid valve  regurgitation.  Normal left ventricular size, systolic function and wall thickness, with no regional wall motion abnormalities.  Ejection fraction 62%.    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 nightly and PRN   • DDD (degenerative disc disease), lumbar    • Depressive disorder    • Diabetes mellitus (CMS/[facility])    • Difficult intubation     per pt has scar tissue in throat and it has interfered with intubation   • DIP (desquamative interstitial pneumonia) (CMS/[facility])    • Essential (primary) hypertension    • Fibromyalgia    • Gastroesophageal reflux disease    • Goiter    • Gout    • High cholesterol    • History of dysmenorrhea    • Neuropathy due to peripheral vascular disease (CMS/[facility])    • On home oxygen therapy     O2/2L/NC at rest   • Pain in both feet 6/29/2021   • RAD (reactive airway disease)    • Sleep apnea     uses CPAP and oxygen   • Urinary incontinence          SOCIAL HISTORY      Social History     Socioeconomic History   • Marital status: Single     Spouse name: Not on file   • Number of children: 4   • Years of education: Not on file   • Highest education level: Not on file   Occupational History   • Occupation: disablity   Tobacco Use   • Smoking status: Every Day     Packs/day: 0.50     Years: 37.00     Pack years: 18.50     Types: Cigarettes     Start date: 1984   • Smokeless tobacco: Never   • Tobacco comments:     3/4 pack daily   Vaping Use   • Vaping Use: never used   Substance and Sexual Activity   • Alcohol use: No   • Drug use: No   • Sexual activity: Not Currently     Partners: Male     Birth control/protection: Condom   Other Topics Concern   • Not on file   Social History Narrative     What are your living arrangements? 3 bedroom apartment        What type of residence do you live in? Whole house        Any potential patient safety issues? none        Do you drive yourself? drives self        Any financial problems?  Has increased income     Social Determinants of Health     Financial Resource Strain: Medium Risk   • Social Determinants: Financial Resource Strain: Utilities   Food Insecurity: No Food Insecurity   • Social Determinants: Food Insecurity: Never   Transportation Needs: No Transportation Needs   • Lack of Transportation (Medical): No   • Lack of Transportation (Non-Medical): No   Physical Activity: Not on file   Stress: Medium Risk   • Social Determinants: Stress: Somewhat   Social Connections: Socially Integrated   • Social Determinants: Social Connections: 5 or more times a week   Intimate Partner Violence: Not At Risk   • Social Determinants: Intimate Partner Violence Past Fear: No   • Social Determinants: Intimate Partner Violence Current Fear: No         FAMILY HISTORY      Family History   Problem Relation Age of Onset   • Cataracts Mother    • Alcohol Abuse Father    • Colon Polyps Sister    • Alcohol Abuse Sister    • Asthma Sister    • Colon Polyps Brother    • Asthma Brother          MEDICATIONS/INFUSIONS     Current Outpatient Medications   Medication Sig   • montelukast (SINGULAIR) 10 MG tablet TAKE ONE TABLET BY MOUTH AT BEDTIME   • Janumet  MG per tablet TAKE ONE TABLET BY MOUTH EVERY DAY   • potassium chloride (KLOR-CON) 10 MEQ ER tablet TAKE ONE TABLET BY MOUTH EVERY DAY   • hydrOXYzine (ATARAX) 50 MG tablet TAKE 2 TABLETS BY MOUTH TWICE DAILY AS NEEDED AND TWO TABLETS AT NIGHT   • sucralfate (CARAFATE) 1 g tablet Take 1 tablet by mouth in the morning and 1 tablet in the evening.   • ammonium lactate (AMLACTIN) 12 % lotion Apply 1 application topically 2 times daily.   • Xarelto 20 MG Tab TAKE ONE TABLET BY MOUTH EVERY DAY WITH DINNER   • buPROPion (WELLBUTRIN)  100 MG tablet Take 1 tablet by mouth 2 times daily.   • DIAZepam (VALIUM) 10 MG tablet Do not start before January 9, 2023. TAKE 1 TABLET BY MOUTH ONCE DAILY AS NEEDED FOR ANXIETY AND AN ADDITIONAL 1/2 TABLET ONCE PER DAY AS NEEDED (#45 PER MONTH) (Patient taking differently: Take 5-10 mg by mouth daily as needed for Anxiety.)   • Aimovig 70 MG/ML injection INJECT 1 ML SUBCUTANEOUSLY EVERY 30 DAYS   • Repatha SureClick 140 MG/ML injection INJECT 1 ML INTO THE SKIN EVERY 14 DAYS   • Icosapent Ethyl 1 g Cap TAKE 4 CAPSULE BY MOUTH ONCE DAILY   • ezetimibe (ZETIA) 10 MG tablet TAKE ONE TABLET BY MOUTH EVERY DAY   • Ranolazine 1000 MG TABLET SR 12 HR TAKE ONE TABLET BY MOUTH 2 TIMES A DAY   • tiZANidine (ZANAFLEX) 4 MG tablet TAKE ONE TABLET BY MOUTH EVERY 6 HOURS AS NEEDED FOR MUSCLE SPASMS   • gabapentin (NEURONTIN) 800 MG tablet TAKE 1 TABLET BY MOUTH EVERY 6 HOURS   • Lantus SoloStar 100 UNIT/ML pen-injector INJECT 44 UNITS SUBCUTANEOUSLY ONCE DAILY   • topiramate (TOPAMAX) 100 MG tablet TAKE 1 TABLET BY MOUTH TWO TIMES PER DAY   • pantoprazole (PROTONIX) 40 MG tablet TAKE 1 TABLET BY MOUTH TWICE DAILY   • albuterol (VENTOLIN) (2.5 MG/3ML) 0.083% nebulizer solution Take 3 mLs by nebulization every 6 hours as needed for Wheezing.   • clobetasol (TEMOVATE) 0.05 % cream Apply sparingly and rub into skin, not more than 10 days at a time   • plecanatide (TRULANCE) 3 MG tablet Take 1 tablet by mouth daily.   • prochlorperazine (COMPAZINE) 5 MG tablet Take 1 tablet by mouth daily as needed for Nausea.   • ARIPiprazole (ABILIFY) 5 MG tablet Take 1 tablet by mouth daily.   • albuterol 108 (90 Base) MCG/ACT inhaler INHALE 2 PUFFS INTO THE LUNGS EVERY 4 HOURS AS NEEDED FOR SHORTNESS OF BREATH OR WHEEZING   • imiquimod (ALDARA) 5 % cream    • oxygen (O2) gas Uses 4 liters when walking and 2 liters at sleep.  No O2 used when sitting   • nystatin (MYCOSTATIN) 793586 UNIT/ML suspension Take 5 mLs by mouth 4 times daily. SWISH AND  SWALLOW   • colchicine (COLCRYS) 0.6 MG tablet TAKE ONE TABLET BY MOUTH EVERY MORNING (Patient taking differently: Take 0.6 mg by mouth daily.)   • allopurinol (ZYLOPRIM) 100 MG tablet TAKE ONE TABLET BY MOUTH 2 TIMES A DAY (Patient taking differently: Take 100 mg by mouth in the morning and 100 mg in the evening.)   • furosemide (LASIX) 20 MG tablet TAKE ONE TABLET BY MOUTH EVERY MORNING (Patient taking differently: Take 20 mg by mouth daily.)   • loratadine (CLARITIN) 10 MG tablet Take 10 mg by mouth daily. Indications: Hayfever   • dilTIAZem (CARDIZEM CD) 240 MG 24 hr capsule TAKE 1 CAPSULE BY MOUTH EVERY DAY (Patient taking differently: Take 240 mg by mouth daily.)   • fluticasone-umeclidin-vilanterol (Trelegy Ellipta) 100-62.5-25 MCG/INH inhaler Inhale 1 puff into the lungs daily. Rinse mouth and throat after each use   • roflumilast (Daliresp) 500 MCG Tab Take 1 tablet by mouth daily.   • diclofenac (VOLTAREN) 75 MG EC tablet TAKE ONE TABLET BY MOUTH 2 TIMES A DAY WITH FOOD AS NEEDED FOR PAIN   • DISPENSE Port a potty   • oxybutynin (DITROPAN-XL) 10 MG 24 hr tablet Take 1 tablet by mouth daily.   • Easy Touch Pen Needles 31G X 6 MM Misc USE TO INJECT 4 TIMES DAILY REMOVE COVER TO EXPOSE NEEDLE BEFORE INJECTING   • SUMAtriptan (IMITREX) 50 MG tablet Take 1 tablet by mouth as needed for Migraine. 1 tablet PO at onset of migraine May repeat after 2 hours PRN 4 tablets maximum in 24 hours   • Insulin Lispro, 1 Unit Dial, (HumaLOG KwikPen) 100 UNIT/ML pen-injector PRIME 2 UNITS BEFORE EACH DOSE. INJECT 2 UNITS UNDER THE SKIN FOR EVERY 50GM OF CARBS. MAX DAILY DOSE IS 8 UNITS   • Continuous Blood Gluc Sensor (FreeStyle Makenzie 14 Day Sensor) Misc 1 each every 14 days.   • Continuous Blood Gluc  (FreeStyle Makenzie 14 Day [place]) Device 1 each 1 time.   • ipratropium-albuterol (DUONEB) 0.5-2.5 (3) MG/3ML nebulizer solution Take 3 mLs by nebulization every 6 hours as needed for Wheezing or Shortness of Breath.   •  CHOLECALCIFEROL PO Take 2,000 Units by mouth daily. Vit D   • aspirin 81 MG tablet Take 1 tablet by mouth daily.   • MAGNESIUM OXIDE PO Take 400 mg by mouth daily.     No current facility-administered medications for this visit.         ALLERGIES     ALLERGIES:   Allergen Reactions   • Tuberculin Other (See Comments)     Reaction: severe arm swelling   • Amoclan Nausea & Vomiting     Augmentin     • Aspirin GI UPSET     Hx of ulcers   • Bupropion HIVES     Wellbutrin   • Clavulanic Acid RASH     Augmentin   • Codeine Nausea & Vomiting and Other (See Comments)     Reaction: nausea and vomitting  States she has projective vomiting within seconds of taking Tylenol 3, but can tolerate Norco      • Hydroxychloroquine NAUSEA         PHYSICAL EXAM     Vitals:    02/01/23 1038   BP: 120/80   Pulse: 92   Resp: (!) 24   Weight: 85.7 kg (189 lb)   Height: 5' 1\" (1.549 m)      General:  NAD, pleasant, alert and oriented x3.  Neck:  No carotid bruits.  No JVD (jugular venous distension).  Normal carotid upstroke.  Pulmonary:  No retractions or increased work of breathing.  Clear to auscultation bilaterally.  No crackles.  End expiratory wheezing bilaterally  Cardiovascular:  PMI (point of maximal impulse) in 5th intercostal place.  RRR.  Normal S1 and S2.  No S3 or S4 appreciated.  There are no murmurs.  Abdomen:  Bowel sounds normoactive.  Soft, nontender, nondistended.  No hepatosplenomegaly.  Extremities:  No edema or cyanosis.     Glucose (mg/dL)   Date Value   01/25/2023 146 (H)       Cholesterol (mg/dL)   Date Value   07/18/2022 306 (H)     HDL (mg/dL)   Date Value   07/18/2022 40 (L)     Cholesterol/ HDL Ratio (no units)   Date Value   07/18/2022 7.7 (H)     Triglycerides (mg/dL)   Date Value   07/18/2022 265 (H)     LDL (mg/dL)   Date Value   07/18/2022 213 (H)       Creatinine (mg/dL)   Date Value   01/25/2023 0.82     BUN (mg/dL)   Date Value   01/25/2023 14     Hemoglobin A1C (%)   Date Value   01/25/2023 7.1 (H)        CHADS VASc Score   Congestive Heart Failure:  0 (YES: 1, NO: 0)   Hypertension:  1 (YES: 1, NO: 0)   Age:  0 (<65: 0, 65-74: 1, >75: 2)   Diabetes Mellitus:  1 (YES: 1, NO: 0)   Stroke/TIA/Thromboembolism:  0 (YES: 2, NO: 0)   Vascular Disease:  0 (YES: 1, NO: 0)   Sex:  1 (Male: 0, Female: 1)   Total Score:  3  CHADSVASC clinical risk estimation.  Adapted from Jr et al.     [id number]   SCORE  ADJUSTED STROKE RATE (% year)    0  0%    1  1,3%    2  2,2%    3  3,2%    4  4,0%    5  6,7%    6  9,8%    7  9,6%    8  6,7%    9  15,2%        EKG 11/11/2022:  Normal sinus rhythm' possible left atrial enlargement' left anterior fascicular block; Abnormal ECG    ASSESSMENT     1.  Paroxysmal atrial fibrillation now in normal sinus rhythm.  2.  Diabetes.  3.  Hypertension.  4.  Hyperlipidemia.  5.  Chest pain and shortness of breath.  6.  Intermittent claudication.  7.  History of fibromyalgia.  8.  Chronic pain.  9.  Palpitations.  10. Chronic obstructive pulmonary disease.  11. Surgery on 05/03/2023 for manometry anorectal and 2/17/2023 for left atrial appendage occlusion-cv.   12. Mild coronary calcifications     NICHOLAS Schwarz is a 51 year old female who has a history of paroxysmal atrial fibrillation who is having chest pain know shortness of breath.  Chest pain is atypical.  I have recommended to the patient to undergo the Watchman device.  The patient have to undergo surgery on 05/03/2023 for manometry anorectal and 2/17/2023 for left atrial appendage occlusion-cv. Will hold off on ordering any cardiac testing until she has the WatchMan device done. She is to present for a nurse visit in 4 weeks. She is to continue on Xarelto 20 mg, daily . Her blood pressure and pulse are stable today. Her echocardiogram 10/26/2021 was normal with EF of 56%. She has a CHADS vascular score of 3, and should be anticoagulated to reduce her stroke risk. She was advised to take a baby aspirin daily. She continues the  current medications.  She will follow up with me in 6 months. If she has any problems before then, she will contact me sooner. Thank you for allowing me to participate in the care of this patient.      On 2/1/2023, I, Penelope Smith, personally transcribed this document on behalf of  Mike Diaz MD.    The documentation recorded by the scribe accurately and completely reflects the service(s) I personally performed and the decisions made by me.           Sincerely,      Mike Diaz MD-PhD, [facility]             34.8

## 2023-11-06 NOTE — ED ADULT NURSE NOTE - CHPI ED NUR RELIEVING FX
none Complex Repair And O-T Advancement Flap Text: The defect edges were debeveled with a #15 scalpel blade.  The primary defect was closed partially with a complex linear closure.  Given the location of the remaining defect, shape of the defect and the proximity to free margins an O-T advancement flap was deemed most appropriate for complete closure of the defect.  Using a sterile surgical marker, an appropriate advancement flap was drawn incorporating the defect and placing the expected incisions within the relaxed skin tension lines where possible. The area thus outlined was incised deep to adipose tissue with a #15 scalpel blade. The skin margins were undermined to an appropriate distance in all directions utilizing iris scissors and carried over to close the primary defect.

## 2024-03-22 NOTE — H&P ADULT - ASSESSMENT
Patient is 75 y/o F with a significant PMHx of leukemia, last chemo on November, on antibiotics for PNA, presents to the ED with complaints of uncontrollable shaking since yesterday associated with lightheadedness when she tries to get up from the bed. bilateral upper extremity ROM was WFL (within functional limits)/bilateral lower extremity ROM was WFL (within functional limits)

## 2024-11-14 NOTE — PATIENT PROFILE ADULT - BRADEN SENSORY
The groin was prepped. The site was prepped with ChloraPrep. The site was clipped. The patient was draped. (4) no impairment

## 2025-07-16 NOTE — PATIENT PROFILE ADULT - DO YOU FEEL UNSAFE AT WORK?
Case was discussed with DALE and the patient's admission status was agreed to be Admission Status: observation status to the service of Dr. Shannon .   no